# Patient Record
Sex: FEMALE | Race: WHITE | NOT HISPANIC OR LATINO | Employment: OTHER | URBAN - METROPOLITAN AREA
[De-identification: names, ages, dates, MRNs, and addresses within clinical notes are randomized per-mention and may not be internally consistent; named-entity substitution may affect disease eponyms.]

---

## 2017-01-05 ENCOUNTER — TELEPHONE (OUTPATIENT)
Dept: INTERNAL MEDICINE | Facility: CLINIC | Age: 77
End: 2017-01-05

## 2017-01-05 NOTE — TELEPHONE ENCOUNTER
----- Message from Christelle Saha sent at 1/5/2017  3:16 PM EST -----  Contact: Clara with [facility]  MAYA Billy pt -    Being discharged from [facility] tomorrow 1/6/2017.  Dr Bryan is following pts PT/INR   [facility] calling for continued nursing care.  They would like order for skilled nursing care    Please return verbal order to Clara  # [phone]

## 2017-01-16 RX ORDER — WARFARIN SODIUM 5 MG/1
5 TABLET ORAL
Qty: 180 TABLET | Refills: 0 | Status: SHIPPED | OUTPATIENT
Start: 2017-01-16 | End: 2017-01-24

## 2017-01-18 ENCOUNTER — OFFICE VISIT (OUTPATIENT)
Dept: CARDIOLOGY | Facility: CLINIC | Age: 77
End: 2017-01-18

## 2017-01-18 ENCOUNTER — HOSPITAL ENCOUNTER (OUTPATIENT)
Dept: CARDIOLOGY | Facility: HOSPITAL | Age: 77
Discharge: HOME | End: 2017-01-18
Attending: INTERNAL MEDICINE | Admitting: INTERNAL MEDICINE

## 2017-01-18 VITALS
SYSTOLIC BLOOD PRESSURE: 120 MMHG | HEIGHT: 62 IN | DIASTOLIC BLOOD PRESSURE: 60 MMHG | BODY MASS INDEX: 25.76 KG/M2 | WEIGHT: 140 LBS | HEART RATE: 52 BPM

## 2017-01-18 DIAGNOSIS — I35.0 NONRHEUMATIC AORTIC VALVE STENOSIS: Primary | ICD-10-CM

## 2017-01-18 DIAGNOSIS — I35.0 NONRHEUMATIC AORTIC VALVE STENOSIS: ICD-10-CM

## 2017-01-18 DIAGNOSIS — R94.31 ABNORMAL ELECTROCARDIOGRAM: ICD-10-CM

## 2017-01-18 DIAGNOSIS — Z95.2 S/P AVR: ICD-10-CM

## 2017-01-18 LAB
BH CV STRESS BP STAGE 1: NORMAL
BH CV STRESS DURATION MIN STAGE 1: 3
BH CV STRESS DURATION MIN STAGE 2: 1
BH CV STRESS DURATION SEC STAGE 1: 0
BH CV STRESS DURATION SEC STAGE 2: 0
BH CV STRESS GRADE STAGE 1: 0
BH CV STRESS GRADE STAGE 2: 5
BH CV STRESS HR STAGE 1: 58
BH CV STRESS HR STAGE 2: 60
BH CV STRESS METS STAGE 1: 2.3
BH CV STRESS METS STAGE 2: 3.5
BH CV STRESS PROTOCOL 1: NORMAL
BH CV STRESS RECOVERY BP: NORMAL MMHG
BH CV STRESS RECOVERY HR: 52 BPM
BH CV STRESS SPEED STAGE 1: 1.7
BH CV STRESS SPEED STAGE 2: 1.7
BH CV STRESS STAGE 1: 1
BH CV STRESS STAGE 2: 2
MAXIMAL PREDICTED HEART RATE: 144 BPM
PERCENT MAX PREDICTED HR: 41.67 %
STRESS BASELINE BP: NORMAL MMHG
STRESS BASELINE HR: 52 BPM
STRESS PERCENT HR: 49 %
STRESS POST EXERCISE DUR MIN: 4 MIN
STRESS POST PEAK BP: NORMAL MMHG
STRESS POST PEAK HR: 60 BPM
STRESS TARGET HR: 122 BPM

## 2017-01-18 PROCEDURE — 99214 OFFICE O/P EST MOD 30 MIN: CPT | Performed by: INTERNAL MEDICINE

## 2017-01-18 PROCEDURE — 93017 CV STRESS TEST TRACING ONLY: CPT

## 2017-01-18 PROCEDURE — 93018 CV STRESS TEST I&R ONLY: CPT | Performed by: INTERNAL MEDICINE

## 2017-01-18 PROCEDURE — 93016 CV STRESS TEST SUPVJ ONLY: CPT | Performed by: INTERNAL MEDICINE

## 2017-01-18 PROCEDURE — 93000 ELECTROCARDIOGRAM COMPLETE: CPT | Performed by: INTERNAL MEDICINE

## 2017-01-18 RX ORDER — FUROSEMIDE 40 MG/1
20 TABLET ORAL DAILY
Qty: 30 TABLET | Refills: 1
Start: 2017-01-18 | End: 2017-03-01

## 2017-01-18 RX ORDER — AMIODARONE HYDROCHLORIDE 200 MG/1
200 TABLET ORAL DAILY
Start: 2017-01-18 | End: 2017-03-01

## 2017-01-18 NOTE — PROGRESS NOTES
Date of Office Visit: 2017  Encounter Provider: Zaria Bryan MD  Place of Service: [facility] CARDIOLOGY  Patient Name: Dory Hampton  :1940      Patient ID:  Dory Hampton is a 76 y.o. female is here for  followup for AS, s/p AVR.         History of Present Illness    She had an echocardiogram which was performed on 2016 for a murmur. Her ejection fraction was 68% with moderate concentric left ventricular hypertrophy, normal segmental wall motion, grade II diastolic dysfunction, moderate aortic insufficiency and severe aortic stenosis. Her mean gradient was 64 mmHg with a maximum pressure gradient of 102. Her aortic valve area was calculated at 0.51. Her maximum velocity was 4 meters per second.      She has had anemia. In 2015 she presented to the emergency department at [facility] with a syncopal episode and was found to have a hemoglobin of 5. She had an extensive gastrointestinal evaluation including upper and lower endoscopy, and she did the capsule camera, and no gastrointestinal bleeding site was found. She then was transfused at that point. Then in 2016 she required a transfusion again. She was referred to Dr. Natalia Fu from [facility] hematology/oncology. Dr. Fu saw the patient and performed another transfusion on her in 2016 for a hemoglobin on 2016 that was 8.4. She said that when she was transfused in 2015 she felt good and her breathing improved, and she was not as fatigued. When she received her transfusion in 2016 she noticed no improvement in her breathing and she was still very fatigued.     She is treated for hypertension, hyperlipidemia and hypothyroidism and these have been well controlled. She quit smoking in . Her maternal grandmother had heart failure and myocardial infarctions.     The patient is  and lives with her  who has post herpetic neuralgia but otherwise is healthy. She has  three healthy children.       I evaluated her in the office and I felt she was suffering from severe symptomatic aortic stenosis, and I recommended she be worked up for this.  She underwent a cardiac catheterization on 08/25/2016 and this revealed 10% mid left main stenosis, normal left circumflex, 50% mid left anterior descending artery stenosis, 30% mid RCA stenosis and severe aortic valvular stenosis.  At that time she was given a referral to cardiothoracic surgery.  They ordered bilateral carotid duplex studies which were obtained on 12/15/2016 and revealed mild bilateral internal carotid artery stenosis.  On 12/16/2016 she underwent surgery and received a mini-sternotomy and had a 21 mm Magna pericardial prosthetic aortic valve replacement.  She was in the hospital from 12/15/2016 to 12/23/2016.  During that time she did have postoperative atrial fibrillation with rapid ventricular response and bilateral pleural effusions.  She also did have some anemia requiring a transfusion prior to her surgery.  At one point, there was talk about doing a right thoracentesis but then her pleural effusion resolved with diuretic therapy.  Coumadin was administered because of atrial fibrillation postoperativel y.       She had laboratory values done on 12/22/2016.  Her hemoglobin was 10.1 with a hematocrit of 30.9.  She has a basic metabolic panel performed on 12/23/2016 which was essentially normal.       At this time she is doing well.  She has no tachycardia but she does have some mild dizziness, especially when she stands up.  She feels lightheaded at times.  She has had no exertional chest pain but she does have some tightness when she is just sitting there, which she thinks is just her chest healing.  She has no difficulty breathing.  She is tolerating her medications well.  She has had a slight nosebleed but no black stools and no bloody stools.  She remains on warfarin at this time because of atrial fibrillation.   Overall, she feels like she is doing better.          Past Medical History   Diagnosis Date   • Anemia    • Aortic stenosis    • Asthma      HISTORY   • Bradycardia    • Chest pain    • Coronary artery disease      heart murmer   • Environmental allergies    • Fatigue    • Female stress incontinence    • History of diverticulosis    • History of transfusion    • Hyperlipidemia    • Hypertension    • Hypokalemia    • Hypothyroid    • Iron deficiency anemia      hemolytic anemia   • PAF (paroxysmal atrial fibrillation)      with RVR   • Pleural effusion      bilateral, post op   • Psoriasis    • Tendinitis    • Thoracic aorta atherosclerosis          Past Surgical History   Procedure Laterality Date   • Dental procedure  2011     Implants   • Cataract extraction Bilateral      Left 12/10/13, Right 12/17/2013   • Colonoscopy  06/22/2015     Dr. Mohamud   • Endoscopy  06/22/2015     Dr. Mohamud   • Capsule endoscopy  2015   • Tonsillectomy     • Tubal abdominal ligation     • Appendectomy  06/1972   • Cardiac catheterization N/A 8/25/2016     Procedure: Coronary angiography;  Surgeon: Lulú Hooper MD;  Location: [facility] CATH INVASIVE LOCATION;  Service:    • Cardiac catheterization N/A 8/25/2016     Procedure: Right Heart Cath;  Surgeon: Lulú Hooper MD;  Location: [facility] CATH INVASIVE LOCATION;  Service:    • Cataract extraction     • Pelvic laparoscopy     • Small bowel enteroscopy N/A 11/21/2016     Procedure: ENTEROSCOPY SMALL BOWEL WITH APC CAUTERY;  Surgeon: Tj Torres MD;  Location: [facility] ENDOSCOPY;  Service:    • Aortic valve repair/replacement N/A 12/16/2016     Procedure: BHARGAVI MINI STERNOTOMY AORTIC VALVE REPLACEMENT;  Surgeon: Robin Jones MD;  Location: [facility] OR;  Service:    • Sternotomy       mini       Current Outpatient Prescriptions on File Prior to Visit   Medication Sig Dispense Refill   • acetaminophen (TYLENOL) 325 MG tablet Take 2 tablets by mouth Every 4 (Four) Hours As  Needed for mild pain (1-3).  0   • amiodarone (PACERONE) 200 MG tablet Take 1 tablet by mouth Every 8 (Eight) Hours.     • aspirin 81 MG EC tablet Take 1 tablet by mouth Daily.     • betamethasone valerate (VALISONE) 0.1 % cream Apply 1 application topically Daily As Needed.     • calcipotriene-betamethasone (TACLONEX) 0.005-0.064 % ointment Apply 1 application topically daily.     • clarithromycin (BIAXIN) 500 MG tablet Take 500 mg by mouth as needed. Prior to dental procedures     • Cyanocobalamin (VITAMIN B-12 PO) Take 1,000 mcg by mouth daily.     • ferrous sulfate 325 (65 FE) MG tablet Take 325 mg by mouth Daily With Breakfast. PT HOLDING FOR SURGERY     • furosemide (LASIX) 40 MG tablet Take 1 tablet by mouth Daily. 30 tablet 1   • GuaiFENesin (MUCINEX PO) Take 600 mg by mouth as needed.     • levothyroxine (SYNTHROID, LEVOTHROID) 88 MCG tablet Take 88 mcg by mouth daily.     • metoprolol tartrate (LOPRESSOR) 25 MG tablet Take 1 tablet by mouth 2 (Two) Times a Day. 60 tablet 1   • Multiple Vitamins-Minerals (CENTRUM SILVER PO) Take 1 tablet by mouth Daily. PT HOLDING FOR SURGERY     • potassium chloride (MICRO-K) 10 MEQ CR capsule Take 2 capsules by mouth Daily. 30 capsule 1   • Pyridoxine HCl (VITAMIN B6 PO) Take 100 mg by mouth daily.     • Red Yeast Rice 600 MG tablet Take 600 mg by mouth Daily. PT HOLDING FOR SURGERY     • warfarin (COUMADIN) 2 MG tablet Take 1 tablet by mouth Daily. Or as instructed 30 tablet    • warfarin (COUMADIN) 5 MG tablet Take 1 tablet by mouth Daily. 180 tablet 0     No current facility-administered medications on file prior to visit.        Social History     Social History   • Marital status:      Spouse name: Kody   • Number of children: N/A   • Years of education: College     Occupational History   • Teacher, retired      [employer]     Social History Main Topics   • Smoking status: Former Smoker     Packs/day: 1.00     Years: 20.00     Types: Cigarettes   • Smokeless  "tobacco: Never Used      Comment: QUIT 2005   • Alcohol use Yes      Comment: 1 DRINK A WEEK   ///   caffeine use   • Drug use: No   • Sexual activity: Defer     Other Topics Concern   • Not on file     Social History Narrative           Review of Systems   Constitution: Negative.   HENT: Negative for congestion and headaches.    Eyes: Negative for vision loss in left eye and vision loss in right eye.   Respiratory: Negative.  Negative for cough, hemoptysis, shortness of breath, sleep disturbances due to breathing, snoring, sputum production and wheezing.    Endocrine: Negative.    Hematologic/Lymphatic: Negative.    Skin: Negative for poor wound healing and rash.   Musculoskeletal: Negative for falls, gout, muscle cramps and myalgias.   Gastrointestinal: Negative for abdominal pain, diarrhea, dysphagia, hematemesis, melena, nausea and vomiting.   Neurological: Negative for excessive daytime sleepiness, dizziness, light-headedness, loss of balance, seizures and vertigo.   Psychiatric/Behavioral: Negative for depression and substance abuse. The patient is not nervous/anxious.        Procedures    ECG 12 Lead  Date/Time: 1/18/2017 3:22 PM  Performed by: DYANA FIGUEROA  Authorized by: DYANA FIGUEROA   Comparison: compared with previous ECG   Similar to previous ECG  Rhythm: sinus rhythm  Conduction: 1st degree  T flattening: I and aVL  Clinical impression: non-specific ECG               Objective:      Vitals:    01/18/17 1509   BP: 120/60   BP Location: Right arm   Patient Position: Sitting   Pulse: 52   Weight: 140 lb (63.5 kg)   Height: 62\" (157.5 cm)     Body mass index is 25.61 kg/(m^2).    Physical Exam   Constitutional: She is oriented to person, place, and time. She appears well-developed and well-nourished. No distress.   HENT:   Head: Normocephalic and atraumatic.   Eyes: Conjunctivae are normal. No scleral icterus.   Neck: Neck supple. No JVD present. Carotid bruit is not present. No thyromegaly " present.   Cardiovascular: Normal rate, regular rhythm, S1 normal, S2 normal and intact distal pulses.   No extrasystoles are present. PMI is not displaced.  Exam reveals no gallop.    Murmur heard.   Medium-pitched harsh midsystolic murmur is present with a grade of 2/6   Pulses:       Carotid pulses are 2+ on the right side, and 2+ on the left side.       Radial pulses are 2+ on the right side, and 2+ on the left side.        Dorsalis pedis pulses are 2+ on the right side, and 2+ on the left side.        Posterior tibial pulses are 2+ on the right side, and 2+ on the left side.   Pulmonary/Chest: Effort normal and breath sounds normal. No respiratory distress. She has no wheezes. She has no rhonchi. She has no rales. She exhibits no tenderness.   Abdominal: Soft. Bowel sounds are normal. She exhibits no distension, no abdominal bruit and no mass. There is no tenderness.   Musculoskeletal: She exhibits no edema or deformity.   Lymphadenopathy:     She has no cervical adenopathy.   Neurological: She is alert and oriented to person, place, and time. No cranial nerve deficit.   Skin: Skin is warm and dry. No rash noted. She is not diaphoretic. No cyanosis. No pallor. Nails show no clubbing.   Psychiatric: She has a normal mood and affect. Judgment normal.   Vitals reviewed.      Lab Review:       Assessment:      Diagnosis Plan   1. Nonrheumatic aortic valve stenosis     2. S/P AVR        1. Severe aortic stenosis status post tissue-type aortic valve replacement.  At this time she is doing well.    2. Atrial fibrillation after her aortic valve replacement.  She is still on warfarin and amiodarone at this time but we will decrease the amiodarone to 200 mg daily with the idea of discontinuing after her next visit.  After that, we will probably place a Holter monitor to make sure she is not having any more atrial fibrillation, and then consider stopping warfarin.    3. Pleural effusions which have resolved after surgery.   We will decrease her Lasix to 20 mg daily with the idea of discontinuing it in two weeks and stop her potassium at that time.    4. Mild coronary artery disease.   5. History of anemia.  She follows with Dr. Fu but she has not seen her in some time, so she may need a followup with her.  Her last hemoglobin was about 10.  I have asked her to get a BMP and CBC with Dr. Billy.           Plan:       Overall doing well, rehab treadmill today and start rehab.  She may drive.

## 2017-01-18 NOTE — MR AVS SNAPSHOT
Dory Lupin   1/18/2017 2:40 PM   Office Visit    Dept Phone:  [phone]   Encounter #:  [id number]    Provider:  Zaria Bryan MD   Department:  [facility] CARDIOLOGY                Your Full Care Plan              Today's Medication Changes          These changes are accurate as of: 1/18/17  4:22 PM.  If you have any questions, ask your nurse or doctor.               Medication(s)that have changed:     amiodarone 200 MG tablet   Commonly known as:  PACERONE   Take 1 tablet by mouth Daily.   What changed:  when to take this   Changed by:  Zaria Bryan MD       furosemide 40 MG tablet   Commonly known as:  LASIX   Take 0.5 tablets by mouth Daily.   What changed:  how much to take   Changed by:  Zaria Bryan MD         Stop taking medication(s)listed here:     potassium chloride 10 MEQ CR capsule   Commonly known as:  MICRO-K   Stopped by:  Zaria Bryan MD           Red Yeast Rice 600 MG tablet   Stopped by:  Zaria Bryan MD                Where to Get Your Medications      Information about where to get these medications is not yet available     ! Ask your nurse or doctor about these medications     amiodarone 200 MG tablet    furosemide 40 MG tablet                  Your Updated Medication List          This list is accurate as of: 1/18/17  4:22 PM.  Always use your most recent med list.                acetaminophen 325 MG tablet   Commonly known as:  TYLENOL   Take 2 tablets by mouth Every 4 (Four) Hours As Needed for mild pain (1-3).       amiodarone 200 MG tablet   Commonly known as:  PACERONE   Take 1 tablet by mouth Daily.       aspirin 81 MG EC tablet   Take 1 tablet by mouth Daily.       betamethasone valerate 0.1 % cream   Commonly known as:  VALISONE       CENTRUM SILVER PO       clarithromycin 500 MG tablet   Commonly known as:  BIAXIN       ferrous sulfate 325 (65 FE) MG tablet       furosemide 40 MG tablet    Commonly known as:  LASIX   Take 0.5 tablets by mouth Daily.       levothyroxine 88 MCG tablet   Commonly known as:  SYNTHROID, LEVOTHROID       metoprolol tartrate 25 MG tablet   Commonly known as:  LOPRESSOR   Take 1 tablet by mouth 2 (Two) Times a Day.       MUCINEX PO       TACLONEX 0.005-0.064 % ointment   Generic drug:  calcipotriene-betamethasone       VITAMIN B-12 PO       VITAMIN B6 PO       * warfarin 2 MG tablet   Commonly known as:  COUMADIN   Take 1 tablet by mouth Daily. Or as instructed       * warfarin 5 MG tablet   Commonly known as:  COUMADIN   Take 1 tablet by mouth Daily.       * Notice:  This list has 2 medication(s) that are the same as other medications prescribed for you. Read the directions carefully, and ask your doctor or other care provider to review them with you.            We Performed the Following     Ambulatory Referral to Cardiac Rehab     ECG 12 Lead       You Were Diagnosed With        Codes Comments    Nonrheumatic aortic valve stenosis    -  Primary ICD-10-CM: I35.0  ICD-9-CM: 424.1     S/P AVR     ICD-10-CM: Z95.2  ICD-9-CM: V43.3     Abnormal electrocardiogram     ICD-10-CM: R94.31  ICD-9-CM: 794.31       Instructions     None    Patient Instructions History      Upcoming Appointments     Visit Type Date Time Department    HOSPITAL FOLLOW UP 1/18/2017  2:40 PM [facility] STRESS TEST ONLY [place] 1/18/2017  3:00 PM [facility] CARD    FOLLOW UP 1/24/2017 10:00 AM [facility]    POST-OP 1/26/2017  1:30 PM [facility] CARDIAC SURG [facility]    FOLLOW UP 3/1/2017  2:20 PM [facility] [place]      [facility] Signup     [facility] Money Dashboard allows you to send messages to your doctor, view your test results, renew your prescriptions, schedule appointments, and more. To sign up, go to [employer] and click on the Sign Up Now link in the New User? box. Enter your Money Dashboard Activation Code exactly as it appears below along with the last four digits of your Social  "Security Number and your Date of Birth () to complete the sign-up process. If you do not sign up before the expiration date, you must request a new code.    [employer] Activation Code: [email or web address]  Expires: 2017  4:22 PM    If you have questions, you can email [email or web address] or call [phone] to talk to our [employer] staff. Remember, [employer] is NOT to be used for urgent needs. For medical emergencies, dial 911.               Other Info from Your Visit           Your Appointments     2017 10:00 AM EST   Follow Up with Natasha Billy MD   [facility] INTERNAL MEDICINE (--)    [address]  [facility] [address] [phone]           Arrive 15 minutes prior to appointment.            2017  1:30 PM EST   Post-Op with KEVIN Liao   [facility] CARDIAC SURGERY (--)    [address]  [facility] [address]   [phone]            Mar 01, 2017  2:20 PM EST   Follow Up with Zaria Bryan MD   [facility] CARDIOLOGY (--)    [address]  [facility] [address]   [phone]           Arrive 15 minutes prior to appointment.              Allergies     Eggs Or Egg-derived Products  Swelling    Formaldehyde Allergy Other (See Comments)    Positive allergy testing    Other Allergy Swelling    Tree nuts, pecans, walnuts, eggs        Mouth swells    Penicillins Allergy Rash    Sulfa Antibiotics Allergy Rash      Reason for Visit     Hypertension           Vital Signs     Blood Pressure Pulse Height Weight Body Mass Index Smoking Status    120/60 (BP Location: Right arm, Patient Position: Sitting) 52 62\" (157.5 cm) 140 lb (63.5 kg) 25.61 kg/m2 Former Smoker      Problems and Diagnoses Noted     Aortic heart valve narrowing    Status post aortic valve replacement    Abnormal EKG          Results         "

## 2017-01-20 ENCOUNTER — TELEPHONE (OUTPATIENT)
Dept: CARDIAC SURGERY | Facility: CLINIC | Age: 77
End: 2017-01-20

## 2017-01-20 NOTE — TELEPHONE ENCOUNTER
PT HAS POST OP APPT.  ON 1/24 HOWEVER SHE CANNOT DRIVE AND HER DAUGHTER TEACHES AND CAN ONLY BRING HER AFTER 3 PM. SHE TOOK OFF FROM WORK FOR HER SURGERY AND CANNOT TAKE OFF ANYMORE WITHOUT RISKING LOSING HER JOB. I SPOKE WITH DAUGHTER AND SHE IS WILLING TO BRING HER AFTER WORK ANY DAY OF THE WEEK.

## 2017-01-23 RX ORDER — WARFARIN SODIUM 2 MG/1
TABLET ORAL
Qty: 30 TABLET | Refills: 1 | Status: SHIPPED | OUTPATIENT
Start: 2017-01-23 | End: 2017-02-03 | Stop reason: SDUPTHER

## 2017-01-24 ENCOUNTER — OFFICE VISIT (OUTPATIENT)
Dept: CARDIAC SURGERY | Facility: CLINIC | Age: 77
End: 2017-01-24

## 2017-01-24 ENCOUNTER — OFFICE VISIT (OUTPATIENT)
Dept: INTERNAL MEDICINE | Facility: CLINIC | Age: 77
End: 2017-01-24

## 2017-01-24 VITALS
TEMPERATURE: 97.4 F | WEIGHT: 138 LBS | RESPIRATION RATE: 20 BRPM | HEART RATE: 69 BPM | BODY MASS INDEX: 25.4 KG/M2 | OXYGEN SATURATION: 99 % | DIASTOLIC BLOOD PRESSURE: 78 MMHG | SYSTOLIC BLOOD PRESSURE: 163 MMHG | HEIGHT: 62 IN

## 2017-01-24 VITALS
SYSTOLIC BLOOD PRESSURE: 152 MMHG | HEART RATE: 57 BPM | HEIGHT: 62 IN | OXYGEN SATURATION: 97 % | WEIGHT: 143 LBS | DIASTOLIC BLOOD PRESSURE: 60 MMHG | BODY MASS INDEX: 26.31 KG/M2

## 2017-01-24 DIAGNOSIS — I10 ESSENTIAL HYPERTENSION: ICD-10-CM

## 2017-01-24 DIAGNOSIS — E78.5 HYPERLIPIDEMIA, UNSPECIFIED HYPERLIPIDEMIA TYPE: Primary | ICD-10-CM

## 2017-01-24 DIAGNOSIS — R73.01 ELEVATED FASTING GLUCOSE: ICD-10-CM

## 2017-01-24 DIAGNOSIS — Z95.2 S/P AVR: Primary | ICD-10-CM

## 2017-01-24 DIAGNOSIS — E03.9 HYPOTHYROIDISM, ADULT: ICD-10-CM

## 2017-01-24 LAB
ALBUMIN SERPL-MCNC: 3.47 G/DL (ref 3.4–4.6)
ALBUMIN/GLOB SERPL: 1.3 G/DL
ALP SERPL-CCNC: 80 U/L (ref 46–116)
ALT SERPL W P-5'-P-CCNC: 14 U/L (ref 14–59)
ANION GAP SERPL CALCULATED.3IONS-SCNC: 8 MMOL/L
AST SERPL-CCNC: 24 U/L (ref 7–37)
BILIRUB SERPL-MCNC: 0.5 MG/DL (ref 0.2–1)
BUN BLD-MCNC: 35 MG/DL (ref 6–22)
BUN/CREAT SERPL: 25.4 (ref 7–25)
CALCIUM SPEC-SCNC: 8.5 MG/DL (ref 8.6–10.5)
CHLORIDE SERPL-SCNC: 100 MMOL/L (ref 95–107)
CHOLEST SERPL-MCNC: 184 MG/DL (ref 0–200)
CO2 SERPL-SCNC: 31 MMOL/L (ref 23–32)
CREAT BLD-MCNC: 1.38 MG/DL (ref 0.55–1.02)
DEPRECATED RDW RBC AUTO: 57.4 FL (ref 37–54)
ERYTHROCYTE [DISTWIDTH] IN BLOOD BY AUTOMATED COUNT: 16.6 % (ref 11.5–15)
GFR SERPL CREATININE-BSD FRML MDRD: 37 ML/MIN/1.73
GLOBULIN UR ELPH-MCNC: 2.7 GM/DL
GLUCOSE BLD-MCNC: 103 MG/DL (ref 70–100)
HCT VFR BLD AUTO: 26.6 % (ref 34.1–44.9)
HDLC SERPL-MCNC: 58 MG/DL (ref 40–81)
HGB BLD-MCNC: 8.3 G/DL (ref 11.2–15.7)
LDLC SERPL CALC-MCNC: 106 MG/DL (ref 0–100)
LDLC/HDLC SERPL: 1.83 {RATIO}
MCH RBC QN AUTO: 31.1 PG (ref 26–34)
MCHC RBC AUTO-ENTMCNC: 31.2 G/DL (ref 31–37)
MCV RBC AUTO: 99.6 FL (ref 80–100)
PLATELET # BLD AUTO: 270 10*3/MM3 (ref 150–450)
PMV BLD AUTO: 10.6 FL (ref 6–12)
POTASSIUM BLD-SCNC: 3.3 MMOL/L (ref 3.3–5.3)
PROT SERPL-MCNC: 6.2 G/DL (ref 6.3–8.4)
RBC # BLD AUTO: 2.67 10*6/MM3 (ref 3.93–5.22)
SODIUM BLD-SCNC: 139 MMOL/L (ref 136–145)
TRIGL SERPL-MCNC: 99 MG/DL (ref 0–150)
TSH SERPL DL<=0.05 MIU/L-ACNC: 2.74 MIU/ML (ref 0.4–4.2)
VLDLC SERPL-MCNC: 19.8 MG/DL
WBC NRBC COR # BLD: 8.06 10*3/MM3 (ref 5–10)

## 2017-01-24 PROCEDURE — 99024 POSTOP FOLLOW-UP VISIT: CPT | Performed by: NURSE PRACTITIONER

## 2017-01-24 PROCEDURE — 36415 COLL VENOUS BLD VENIPUNCTURE: CPT | Performed by: INTERNAL MEDICINE

## 2017-01-24 PROCEDURE — 84443 ASSAY THYROID STIM HORMONE: CPT | Performed by: INTERNAL MEDICINE

## 2017-01-24 PROCEDURE — 80061 LIPID PANEL: CPT | Performed by: INTERNAL MEDICINE

## 2017-01-24 PROCEDURE — 85027 COMPLETE CBC AUTOMATED: CPT | Performed by: INTERNAL MEDICINE

## 2017-01-24 PROCEDURE — 99214 OFFICE O/P EST MOD 30 MIN: CPT | Performed by: INTERNAL MEDICINE

## 2017-01-24 PROCEDURE — 80053 COMPREHEN METABOLIC PANEL: CPT | Performed by: INTERNAL MEDICINE

## 2017-01-24 NOTE — PROGRESS NOTES
Chief Complaint   Patient presents with   • Hypertension   • Hypothyroidism        Subjective   Dory Hampton is a 76 y.o. female she comes in for follow-up of hypertension and hypothyroidism.  These are chronic conditions.  Since her last appointment here, she has had hematology evaluation for anemia, GI evaluation for gastrointestinal bleeding, has had AVMs cauterized, has had aortic valve replacement for severe aortic stenosis.  She has had blood transfusions, takes a B12 supplement and iron supplement.    Hypertension   This is a chronic problem. The problem is controlled. Pertinent negatives include no chest pain, headaches, orthopnea, palpitations, peripheral edema or PND. Shortness of breath: she had some shortness of breath with exertion, this has improved with treatment of aortic stenosis and anemia. There are no associated agents to hypertension. Treatments tried: Current treatment metoprolol. The current treatment provides moderate improvement. There are no compliance problems.    Hyperlipidemia   This is a chronic problem. Recent lipid tests were reviewed and are variable (LDL marginally high.). Exacerbating diseases include hypothyroidism. She has no history of diabetes. Pertinent negatives include no chest pain. Shortness of breath: she had some shortness of breath with exertion, this has improved with treatment of aortic stenosis and anemia. Current antihyperlipidemic treatment includes diet change. The current treatment provides moderate improvement of lipids. There are no compliance problems.    :   Elevated fasting glucose:  She does not have history of diabetes.  However, she had elevated fasting glucose during her hospitalization.  She denies polyuria, polydipsia, vision change appetite change, unexplained weight loss.   Lab Results   Component Value Date    HGBA1C 4.30 (L) 12/15/2016         Hypothyroidism: Current treatment levothyroxine. She is compliant with the medication, tolerates it well  "and reports good control of symptoms of hypothyroidism.  She has not noted any change in tolerance of heat or cold.  No change in hair or skin.  No constipation.  No symptoms of hyperthyroidism.  She generally feels cold.  However she does not think that this is a change.    She had atrial fibrillation after aortic valve replacement.  She has recently seen her cardiologist for this.    The following portions of the patient's history were reviewed and updated as appropriate: allergies, current medications, past social history, problem list, past surgical history    Review of Systems   Constitutional: Negative for activity change and appetite change.   HENT: Negative for nosebleeds.    Eyes: Negative for visual disturbance.   Respiratory: Negative for cough. Shortness of breath: she had some shortness of breath with exertion, this has improved with treatment of aortic stenosis and anemia.    Cardiovascular: Negative for chest pain, palpitations, orthopnea, leg swelling and PND.   Neurological: Negative for headaches.   Psychiatric/Behavioral: Negative for sleep disturbance.       Visit Vitals   • /60 (BP Location: Right arm)   • Pulse 57   • Ht 62\" (157.5 cm)   • Wt 143 lb (64.9 kg)   • SpO2 97%   • BMI 26.16 kg/m2        Objective   Physical Exam   Constitutional: She is oriented to person, place, and time. She appears well-developed and well-nourished. No distress.   Neck: Normal carotid pulses present. Carotid bruit is not present.   Cardiovascular: Normal rate, regular rhythm, S1 normal, S2 normal and intact distal pulses.  Exam reveals no gallop and no friction rub.    No murmur heard.  Pulses:       Carotid pulses are 2+ on the right side, and 2+ on the left side.  Pulmonary/Chest: Effort normal and breath sounds normal. No respiratory distress. She has no wheezes. She has no rales. Chest wall is not dull to percussion.   Musculoskeletal: She exhibits no edema.   Neurological: She is alert and oriented to " person, place, and time.   Skin: Skin is warm and dry.   Nursing note and vitals reviewed.      Assessment/Plan   Problem List Items Addressed This Visit        Cardiovascular and Mediastinum    Essential hypertension    Relevant Orders    Comprehensive Metabolic Panel    Lipid Panel    CBC (No Diff)    Hyperlipidemia - Primary       Endocrine    Hypothyroidism, adult    Relevant Orders    TSH      Other Visit Diagnoses     Elevated fasting glucose        Relevant Orders    Hemoglobin A1c

## 2017-01-24 NOTE — MR AVS SNAPSHOT
Dory Memo   1/24/2017 10:00 AM   Office Visit    Dept Phone:  [phone]   Encounter #:  [id number]    Provider:  Natasha Billy MD   Department:  [facility] INTERNAL MEDICINE                Your Full Care Plan              Today's Medication Changes          These changes are accurate as of: 1/24/17 10:33 AM.  If you have any questions, ask your nurse or doctor.               Medication(s)that have changed:     warfarin 2 MG tablet   Commonly known as:  COUMADIN   Take one tablet daily or as directed   What changed:  Another medication with the same name was removed. Continue taking this medication, and follow the directions you see here.   Changed by:  Karol Lujan MA         Stop taking medication(s)listed here:     MUCINEX PO   Stopped by:  Natasha Billy MD                      Your Updated Medication List          This list is accurate as of: 1/24/17 10:33 AM.  Always use your most recent med list.                acetaminophen 325 MG tablet   Commonly known as:  TYLENOL   Take 2 tablets by mouth Every 4 (Four) Hours As Needed for mild pain (1-3).       amiodarone 200 MG tablet   Commonly known as:  PACERONE   Take 1 tablet by mouth Daily.       aspirin 81 MG EC tablet   Take 1 tablet by mouth Daily.       betamethasone valerate 0.1 % cream   Commonly known as:  VALISONE       CENTRUM SILVER PO       clarithromycin 500 MG tablet   Commonly known as:  BIAXIN       ferrous sulfate 325 (65 FE) MG tablet       furosemide 40 MG tablet   Commonly known as:  LASIX   Take 0.5 tablets by mouth Daily.       levothyroxine 88 MCG tablet   Commonly known as:  SYNTHROID, LEVOTHROID       metoprolol tartrate 25 MG tablet   Commonly known as:  LOPRESSOR   Take 1 tablet by mouth 2 (Two) Times a Day.       TACLONEX 0.005-0.064 % ointment   Generic drug:  calcipotriene-betamethasone       VITAMIN B-12 PO       VITAMIN B6 PO       warfarin 2 MG tablet   Commonly known as:  COUMADIN    Take one tablet daily or as directed               You Were Diagnosed With        Codes Comments    Hyperlipidemia, unspecified hyperlipidemia type    -  Primary ICD-10-CM: E78.5  ICD-9-CM: 272.4     Essential hypertension     ICD-10-CM: I10  ICD-9-CM: 401.9     Hypothyroidism, adult     ICD-10-CM: E03.9  ICD-9-CM: 244.9     Elevated fasting glucose     ICD-10-CM: R73.01  ICD-9-CM: 790.21       Instructions     None    Patient Instructions History      Upcoming Appointments     Visit Type Date Time Department    FOLLOW UP 2017 10:00 AM [facility]    POST-OP 2017  3:00 PM [facility]    FOLLOW UP 3/1/2017  2:20 PM [facility] [place]    FOLLOW UP 2017 10:00 AM [facility]      [employer] Signup     [employer] allows you to send messages to your doctor, view your test results, renew your prescriptions, schedule appointments, and more. To sign up, go to [employer] and click on the Sign Up Now link in the New User? box. Enter your [employer] Activation Code exactly as it appears below along with the last four digits of your Social Security Number and your Date of Birth () to complete the sign-up process. If you do not sign up before the expiration date, you must request a new code.    [employer] Activation Code: [email or web address]  Expires: 2017  4:22 PM    If you have questions, you can email [email or web address] or call [phone] to talk to our [employer] staff. Remember, [employer] is NOT to be used for urgent needs. For medical emergencies, dial 911.               Other Info from Your Visit           Your Appointments     2017  3:00 PM EST   Post-Op with KEVIN Gonzales   [facility] CARDIAC SURGERY (--)    [address]  [facility] [address]   [phone]            Mar 01, 2017  2:20 PM EST   Follow Up with Zaria Bryan MD   [facility] CARDIOLOGY (--)    [address]  [address]  [facility] [address] [phone]           Arrive 15 minutes prior to appointment.            May 31, 2017 10:00 AM EDT   Follow Up with Natasha Billy MD   [facility] INTERNAL MEDICINE (--)    [address]  [facility] [address] [phone]           Arrive 15 minutes prior to appointment.              Allergies     Eggs Or Egg-derived Products  Swelling    Formaldehyde Allergy Other (See Comments)    Positive allergy testing    Other Allergy Swelling    Tree nuts, pecans, walnuts, eggs        Mouth swells    Penicillins Allergy Rash    Sulfa Antibiotics Allergy Rash      Reason for Visit     Hypertension     Hypothyroidism           Vital Signs     Blood Pressure Pulse Height Weight Oxygen Saturation Body Mass Index    152/60 (BP Location: Right arm) 57 62\" (157.5 cm) 143 lb (64.9 kg) 97% 26.16 kg/m2    Smoking Status                   Former Smoker           Problems and Diagnoses Noted     High blood pressure    High cholesterol or triglycerides    Hypothyroidism, adult    Elevated fasting glucose            "

## 2017-01-27 ENCOUNTER — TELEPHONE (OUTPATIENT)
Dept: INTERNAL MEDICINE | Facility: CLINIC | Age: 77
End: 2017-01-27

## 2017-02-03 RX ORDER — WARFARIN SODIUM 2 MG/1
TABLET ORAL
Qty: 30 TABLET | Refills: 3 | Status: SHIPPED | OUTPATIENT
Start: 2017-02-03 | End: 2017-02-08 | Stop reason: SDUPTHER

## 2017-02-05 NOTE — PROGRESS NOTES
2/5/2017        Subjective:      Natasha Billy MD    Chief Complaint of No chief complaint on file.           Dear Dr. Natasha Billy MD and Colleagues,    It was nice to see Dory Hampton in follow up today. She is status post mini sternotomy AVR on 12/16/16 per Dr. Jones. She reports feeling very well and denies c/o's. She has seen Dr Bryan and Dr. Billy since d/c. She reports starting cardiac rehab.    Patient Active Problem List   Diagnosis   • Essential hypertension   • Hyperlipidemia   • Hypothyroidism, adult   • Aortic stenosis   • Thoracic aorta atherosclerosis   • Iron deficiency anemia due to chronic blood loss   • Macrocytic anemia   • Hemolytic anemia, mechanical   • Dyspnea on effort   • AVM (arteriovenous malformation) of small bowel, acquired with hemorrhage   • Aortic valve stenosis   • S/P AVR       Past Medical History   Diagnosis Date   • Anemia    • Aortic stenosis    • Asthma      HISTORY   • Bradycardia    • Chest pain    • Coronary artery disease      heart murmer   • Environmental allergies    • Fatigue    • Female stress incontinence    • History of diverticulosis    • History of transfusion    • Hyperlipidemia    • Hypertension    • Hypokalemia    • Hypothyroid    • Iron deficiency anemia      hemolytic anemia   • PAF (paroxysmal atrial fibrillation)      with RVR   • Pleural effusion      bilateral, post op   • Psoriasis    • Tendinitis    • Thoracic aorta atherosclerosis        Past Surgical History   Procedure Laterality Date   • Dental procedure  2011     Implants   • Cataract extraction Bilateral      Left 12/10/13, Right 12/17/2013   • Colonoscopy  06/22/2015     Dr. Mohamud   • Endoscopy  06/22/2015     Dr. Mohamud   • Capsule endoscopy  2015   • Tonsillectomy     • Tubal abdominal ligation     • Appendectomy  06/1972   • Cardiac catheterization N/A 8/25/2016     Procedure: Coronary angiography;  Surgeon: Lulú Hooper MD;  Location: [facility] INVASIVE LOCATION;  Service:    •  Cardiac catheterization N/A 8/25/2016     Procedure: Right Heart Cath;  Surgeon: Lulú Hooper MD;  Location: [facility] CATH INVASIVE LOCATION;  Service:    • Cataract extraction     • Pelvic laparoscopy     • Small bowel enteroscopy N/A 11/21/2016     Procedure: ENTEROSCOPY SMALL BOWEL WITH APC CAUTERY;  Surgeon: Tj Torres MD;  Location: [facility] ENDOSCOPY;  Service:    • Aortic valve repair/replacement N/A 12/16/2016     Procedure: BHARGAVI MINI STERNOTOMY AORTIC VALVE REPLACEMENT;  Surgeon: Robin Jones MD;  Location: [facility] MAIN OR;  Service:    • Sternotomy       mini       Allergies   Allergen Reactions   • Eggs Or Egg-Derived Products Swelling   • Formaldehyde Other (See Comments)     Positive allergy testing   • Other Swelling     Tree nuts, pecans, walnuts, eggs        Mouth swells   • Penicillins Rash   • Sulfa Antibiotics Rash       Review of Systems   Constitutional: Negative.    HENT: Negative.    Eyes: Negative.    Respiratory: Negative.    Cardiovascular: Negative.    Gastrointestinal: Negative.    Genitourinary: Negative.    Musculoskeletal: Negative.    Skin: Negative.    Allergic/Immunologic: Negative.    Neurological: Negative.    Psychiatric/Behavioral: Negative.          Current Outpatient Prescriptions:   •  acetaminophen (TYLENOL) 325 MG tablet, Take 2 tablets by mouth Every 4 (Four) Hours As Needed for mild pain (1-3)., Disp: , Rfl: 0  •  amiodarone (PACERONE) 200 MG tablet, Take 1 tablet by mouth Daily., Disp: , Rfl:   •  aspirin 81 MG EC tablet, Take 1 tablet by mouth Daily., Disp: , Rfl:   •  betamethasone valerate (VALISONE) 0.1 % cream, Apply 1 application topically Daily As Needed., Disp: , Rfl:   •  clarithromycin (BIAXIN) 500 MG tablet, Take 500 mg by mouth as needed. Prior to dental procedures, Disp: , Rfl:   •  Cyanocobalamin (VITAMIN B-12 PO), Take 1,000 mcg by mouth daily., Disp: , Rfl:   •  ferrous sulfate 325 (65 FE) MG tablet, Take 325 mg by mouth Daily With Breakfast. PT  HOLDING FOR SURGERY, Disp: , Rfl:   •  furosemide (LASIX) 40 MG tablet, Take 0.5 tablets by mouth Daily., Disp: 30 tablet, Rfl: 1  •  levothyroxine (SYNTHROID, LEVOTHROID) 88 MCG tablet, Take 88 mcg by mouth daily., Disp: , Rfl:   •  metoprolol tartrate (LOPRESSOR) 25 MG tablet, Take 1 tablet by mouth 2 (Two) Times a Day., Disp: 60 tablet, Rfl: 1  •  Multiple Vitamins-Minerals (CENTRUM SILVER PO), Take 1 tablet by mouth Daily. PT HOLDING FOR SURGERY, Disp: , Rfl:   •  Pyridoxine HCl (VITAMIN B6 PO), Take 100 mg by mouth daily., Disp: , Rfl:   •  calcipotriene-betamethasone (TACLONEX) 0.005-0.064 % ointment, Apply 1 application topically daily., Disp: , Rfl:   •  warfarin (COUMADIN) 2 MG tablet, Take one tablet daily or as directed, Disp: 30 tablet, Rfl: 3    Social History     Social History   • Marital status:      Spouse name: Kody   • Number of children: N/A   • Years of education: College     Occupational History   • Teacher, retired      [employer]     Social History Main Topics   • Smoking status: Former Smoker     Packs/day: 1.00     Years: 20.00     Types: Cigarettes   • Smokeless tobacco: Never Used      Comment: QUIT 2005   • Alcohol use Yes      Comment: 1 DRINK A WEEK   ///   caffeine use   • Drug use: No   • Sexual activity: Defer     Other Topics Concern   • Not on file     Social History Narrative       Family History   Problem Relation Age of Onset   • Endometriosis Mother    • Dementia Mother    • Hypertension Mother    • Uterine cancer Mother    • Alzheimer's disease Mother    • Tongue cancer Father    • Heart failure Maternal Grandmother    • Heart attack Maternal Grandmother    • Stroke Maternal Grandfather    • Heart disease Maternal Grandfather    • Hypertension Maternal Aunt    • Hypertension Maternal Uncle            Vital Signs:  Weight: 138 lb (62.6 kg)   Body mass index is 25.24 kg/(m^2).  Temp: 97.4 °F (36.3 °C)   Heart Rate: 69   BP: 163/78     Physical Exam   Constitutional: She is  oriented to person, place, and time. She appears well-developed and well-nourished. No distress.   HENT:   Head: Normocephalic and atraumatic.   Eyes: Pupils are equal, round, and reactive to light. No scleral icterus.   Neck: Normal range of motion. Neck supple.   Cardiovascular: Normal rate and regular rhythm.    Pulmonary/Chest: Effort normal and breath sounds normal. No respiratory distress.   No sternal instability   Abdominal: Soft. There is no tenderness.   Musculoskeletal: Normal range of motion.   Neurological: She is alert and oriented to person, place, and time.   Skin: Skin is warm and dry.   Surgical incisions well healed   Psychiatric: She has a normal mood and affect. Her behavior is normal. Judgment and thought content normal.        Recommendation/Plan:     Dory Hampton was seen for post operative follow up. She is doing well from a surgical standpoint. Her incisions are healing well. I have released her to resume normal daily activities without restrictions. So long as she continues to follow up with cardiology, she can see Dr. Jones on prn basis.                  Thank you for allowing me to participate in her care.    Sincerely,    KEVIN Gonzales

## 2017-02-08 RX ORDER — FERROUS SULFATE 325(65) MG
TABLET ORAL
Qty: 30 TABLET | Refills: 5 | Status: SHIPPED | OUTPATIENT
Start: 2017-02-08 | End: 2017-03-01 | Stop reason: SDUPTHER

## 2017-02-08 RX ORDER — WARFARIN SODIUM 2 MG/1
TABLET ORAL
Qty: 50 TABLET | Refills: 2 | Status: SHIPPED | OUTPATIENT
Start: 2017-02-08 | End: 2017-04-13

## 2017-02-09 ENCOUNTER — HOSPITAL ENCOUNTER (OUTPATIENT)
Dept: CARDIOLOGY | Facility: HOSPITAL | Age: 77
Setting detail: THERAPIES SERIES
Discharge: HOME | End: 2017-02-09

## 2017-02-09 PROCEDURE — 93798 PHYS/QHP OP CAR RHAB W/ECG: CPT

## 2017-02-13 ENCOUNTER — HOSPITAL ENCOUNTER (OUTPATIENT)
Dept: CARDIOLOGY | Facility: HOSPITAL | Age: 77
Setting detail: THERAPIES SERIES
Discharge: HOME | End: 2017-02-13

## 2017-02-13 ENCOUNTER — TELEPHONE (OUTPATIENT)
Dept: CARDIOLOGY | Facility: CLINIC | Age: 77
End: 2017-02-13

## 2017-02-13 PROCEDURE — 93798 PHYS/QHP OP CAR RHAB W/ECG: CPT

## 2017-02-13 NOTE — TELEPHONE ENCOUNTER
Pharmacy called and was asking for a refill on Pt's lasix. It was in your plan that 2 weeks following her 1/18/17 appointment she was supposed to discontinue both lasix (which was decreased to 20mg once daily) and potassium. Pharmacist reported that she was going to refuse both medications and advise the Pt to contact the office, she appears to be confused with directions. Please advise

## 2017-02-14 RX ORDER — WARFARIN SODIUM 3 MG/1
3 TABLET ORAL
Qty: 30 TABLET | Refills: 2 | Status: SHIPPED | OUTPATIENT
Start: 2017-02-14 | End: 2017-04-13

## 2017-02-15 ENCOUNTER — HOSPITAL ENCOUNTER (OUTPATIENT)
Dept: CARDIOLOGY | Facility: HOSPITAL | Age: 77
Setting detail: THERAPIES SERIES
Discharge: HOME | End: 2017-02-15

## 2017-02-15 PROCEDURE — 93798 PHYS/QHP OP CAR RHAB W/ECG: CPT

## 2017-02-17 ENCOUNTER — APPOINTMENT (OUTPATIENT)
Dept: CARDIOLOGY | Facility: HOSPITAL | Age: 77
End: 2017-02-17

## 2017-02-20 ENCOUNTER — HOSPITAL ENCOUNTER (OUTPATIENT)
Dept: CARDIOLOGY | Facility: HOSPITAL | Age: 77
Setting detail: RECURRING SERIES
Discharge: HOME | End: 2017-02-20

## 2017-02-20 ENCOUNTER — HOSPITAL ENCOUNTER (OUTPATIENT)
Dept: CARDIOLOGY | Facility: HOSPITAL | Age: 77
Setting detail: THERAPIES SERIES
Discharge: HOME | End: 2017-02-20

## 2017-02-20 PROCEDURE — 85610 PROTHROMBIN TIME: CPT

## 2017-02-20 PROCEDURE — 93798 PHYS/QHP OP CAR RHAB W/ECG: CPT

## 2017-02-20 PROCEDURE — 36416 COLLJ CAPILLARY BLOOD SPEC: CPT

## 2017-02-22 ENCOUNTER — HOSPITAL ENCOUNTER (OUTPATIENT)
Dept: CARDIOLOGY | Facility: HOSPITAL | Age: 77
Setting detail: THERAPIES SERIES
Discharge: HOME | End: 2017-02-22

## 2017-02-22 PROCEDURE — 93798 PHYS/QHP OP CAR RHAB W/ECG: CPT

## 2017-02-24 ENCOUNTER — HOSPITAL ENCOUNTER (OUTPATIENT)
Dept: CARDIOLOGY | Facility: HOSPITAL | Age: 77
Setting detail: THERAPIES SERIES
Discharge: HOME | End: 2017-02-24

## 2017-02-24 PROCEDURE — 93798 PHYS/QHP OP CAR RHAB W/ECG: CPT

## 2017-02-27 ENCOUNTER — HOSPITAL ENCOUNTER (OUTPATIENT)
Dept: CARDIOLOGY | Facility: HOSPITAL | Age: 77
Setting detail: THERAPIES SERIES
Discharge: HOME | End: 2017-02-27

## 2017-02-27 PROCEDURE — 93798 PHYS/QHP OP CAR RHAB W/ECG: CPT

## 2017-03-01 ENCOUNTER — OFFICE VISIT (OUTPATIENT)
Dept: CARDIOLOGY | Facility: CLINIC | Age: 77
End: 2017-03-01

## 2017-03-01 ENCOUNTER — HOSPITAL ENCOUNTER (OUTPATIENT)
Dept: CARDIOLOGY | Facility: HOSPITAL | Age: 77
Setting detail: THERAPIES SERIES
Discharge: HOME | End: 2017-03-01

## 2017-03-01 VITALS
HEART RATE: 52 BPM | WEIGHT: 146 LBS | HEIGHT: 62 IN | BODY MASS INDEX: 26.87 KG/M2 | SYSTOLIC BLOOD PRESSURE: 190 MMHG | DIASTOLIC BLOOD PRESSURE: 80 MMHG

## 2017-03-01 DIAGNOSIS — E78.5 HYPERLIPIDEMIA, UNSPECIFIED HYPERLIPIDEMIA TYPE: ICD-10-CM

## 2017-03-01 DIAGNOSIS — I10 ESSENTIAL HYPERTENSION: ICD-10-CM

## 2017-03-01 DIAGNOSIS — Z95.2 S/P AVR: Primary | ICD-10-CM

## 2017-03-01 DIAGNOSIS — K55.21 AVM (ARTERIOVENOUS MALFORMATION) OF SMALL BOWEL, ACQUIRED WITH HEMORRHAGE: ICD-10-CM

## 2017-03-01 DIAGNOSIS — I48.0 PAF (PAROXYSMAL ATRIAL FIBRILLATION) (HCC): ICD-10-CM

## 2017-03-01 PROCEDURE — 93798 PHYS/QHP OP CAR RHAB W/ECG: CPT

## 2017-03-01 PROCEDURE — 93000 ELECTROCARDIOGRAM COMPLETE: CPT | Performed by: INTERNAL MEDICINE

## 2017-03-01 PROCEDURE — 99214 OFFICE O/P EST MOD 30 MIN: CPT | Performed by: INTERNAL MEDICINE

## 2017-03-01 RX ORDER — LOSARTAN POTASSIUM AND HYDROCHLOROTHIAZIDE 12.5; 5 MG/1; MG/1
1 TABLET ORAL DAILY
Qty: 90 TABLET | Refills: 3 | Status: SHIPPED | OUTPATIENT
Start: 2017-03-01 | End: 2017-04-13

## 2017-03-01 NOTE — PROGRESS NOTES
Date of Office Visit: 2017  Encounter Provider: Zaria Bryan MD  Place of Service: [facility] CARDIOLOGY  Patient Name: Dory Hampton  :1940      Patient ID:  Dory Hampton is a 76 y.o. female is here for  followup for s/p AVR.         History of Present Illness    She had an echocardiogram which was performed on 2016 for a murmur. Her ejection fraction was 68% with moderate concentric left ventricular hypertrophy, normal segmental wall motion, grade II diastolic dysfunction, moderate aortic insufficiency and severe aortic stenosis. Her mean gradient was 64 mmHg with a maximum pressure gradient of 102. Her aortic valve area was calculated at 0.51. Her maximum velocity was 4 meters per second.       She has had anemia. In 2015 she presented to the emergency department at [facility] with a syncopal episode and was found to have a hemoglobin of 5. She had an extensive gastrointestinal evaluation including upper and lower endoscopy, and she did the capsule camera, and no gastrointestinal bleeding site was found. She then was transfused at that point. Then in 2016 she required a transfusion again. She was referred to Dr. Natalia Fu from [facility] hematology/oncology. Dr. Fu saw the patient and performed another transfusion on her in 2016 for a hemoglobin on 2016 that was 8.4. She said that when she was transfused in 2015 she felt good and her breathing improved, and she was not as fatigued. When she received her transfusion in 2016 she noticed no improvement in her breathing and she was still very fatigued.      She is treated for hypertension, hyperlipidemia and hypothyroidism and these have been well controlled. She quit smoking in . Her maternal grandmother had heart failure and myocardial infarctions.      The patient is  and lives with her  who has post herpetic neuralgia but otherwise is healthy. She has  three healthy children.       I evaluated her in the office and I felt she was suffering from severe symptomatic aortic stenosis, and I recommended she be worked up for this. She underwent a cardiac catheterization on 08/25/2016 and this revealed 10% mid left main stenosis, normal left circumflex, 50% mid left anterior descending artery stenosis, 30% mid RCA stenosis and severe aortic valvular stenosis. At that time she was given a referral to cardiothoracic surgery. They ordered bilateral carotid duplex studies which were obtained on 12/15/2016 and revealed mild bilateral internal carotid artery stenosis. On 12/16/2016 she underwent surgery and received a mini-sternotomy and had a 21 mm Magna pericardial prosthetic aortic valve replacement. She was in the hospital from 12/15/2016 to 12/23/2016. During that time she did have postoperative atrial fibrillation with rapid ventricular response and bilateral pleural effusions. She also did have some anemia requiring a transfusion prior to her surgery. At one point, there was talk about doing a right thoracentesis but then her pleural effusion resolved with diuretic therapy. Coumadin was administered because of atrial fibrillation postoperativel y.       At this time she is doing well.  She still has some mild dyspnea when she goes up steps.  She has had no tachycardia, dizziness or syncope.   She is tolerating her medications well.  She has had no gastrointestinal bleeding.   Her blood pressure is high today.   I did recheck it and I got 190/85.   She is not currently taking any medications for hypertension.  She is in cardiac rehabilitation.              Past Medical History   Diagnosis Date   • Abnormal electrocardiogram    • Anemia    • Aortic stenosis      severe   • Asthma      HISTORY   • Bradycardia    • Chest pain    • Coronary artery disease      heart murmer   • Diastolic dysfunction    • Environmental allergies    • Fatigue    • Female stress incontinence    •  History of diverticulosis    • History of transfusion    • Hyperlipidemia    • Hypertension    • Hypokalemia    • Hypothyroid    • Iron deficiency anemia      hemolytic anemia   • Nonrheumatic aortic (valve) stenosis    • PAF (paroxysmal atrial fibrillation)      with RVR   • Pleural effusion      bilateral, post op   • Psoriasis    • Tendinitis    • Thoracic aorta atherosclerosis    • Ventricular tachycardia      left         Past Surgical History   Procedure Laterality Date   • Dental procedure  2011     Implants   • Cataract extraction Bilateral      Left 12/10/13, Right 12/17/2013   • Colonoscopy  06/22/2015     Dr. Mohamud   • Endoscopy  06/22/2015     Dr. Mohamud   • Capsule endoscopy  2015   • Tonsillectomy     • Tubal abdominal ligation     • Appendectomy  06/1972   • Cardiac catheterization N/A 8/25/2016     Procedure: Coronary angiography;  Surgeon: Lulú Hooper MD;  Location[facility] CATH INVASIVE LOCATION;  Service:    • Cardiac catheterization N/A 8/25/2016     Procedure: Right Heart Cath;  Surgeon: Lulú Hooper MD;  Location: [facility] CATH INVASIVE LOCATION;  Service:    • Cataract extraction     • Pelvic laparoscopy     • Small bowel enteroscopy N/A 11/21/2016     Procedure: ENTEROSCOPY SMALL BOWEL WITH APC CAUTERY;  Surgeon: Tj Torres MD;  Location: [facility] ENDOSCOPY;  Service:    • Aortic valve repair/replacement N/A 12/16/2016     Procedure: BHARGAVI MINI STERNOTOMY AORTIC VALVE REPLACEMENT;  Surgeon: Robin Jones MD;  Location: [facility] MAIN OR;  Service:    • Sternotomy       mini       Current Outpatient Prescriptions on File Prior to Visit   Medication Sig Dispense Refill   • acetaminophen (TYLENOL) 325 MG tablet Take 2 tablets by mouth Every 4 (Four) Hours As Needed for mild pain (1-3).  0   • amiodarone (PACERONE) 200 MG tablet Take 1 tablet by mouth Daily.     • aspirin 81 MG EC tablet Take 1 tablet by mouth Daily.     • betamethasone valerate (VALISONE) 0.1 % cream Apply 1  application topically Daily As Needed.     • calcipotriene-betamethasone (TACLONEX) 0.005-0.064 % ointment Apply 1 application topically daily.     • clarithromycin (BIAXIN) 500 MG tablet Take 500 mg by mouth as needed. Prior to dental procedures     • Cyanocobalamin (VITAMIN B-12 PO) Take 1,000 mcg by mouth daily.     • ferrous sulfate 325 (65 FE) MG tablet Take 325 mg by mouth Daily With Breakfast. PT HOLDING FOR SURGERY     • levothyroxine (SYNTHROID, LEVOTHROID) 88 MCG tablet Take 88 mcg by mouth daily.     • metoprolol tartrate (LOPRESSOR) 25 MG tablet Take 1 tablet by mouth 2 (Two) Times a Day. 180 tablet 1   • Multiple Vitamins-Minerals (CENTRUM SILVER PO) Take 1 tablet by mouth Daily. PT HOLDING FOR SURGERY     • Pyridoxine HCl (VITAMIN B6 PO) Take 100 mg by mouth daily.     • warfarin (COUMADIN) 2 MG tablet Take 1.5 tablets daily or as directed 50 tablet 2   • warfarin (COUMADIN) 3 MG tablet Take 1 tablet by mouth Daily. 30 tablet 2   • [DISCONTINUED] ferrous sulfate 325 (65 FE) MG tablet TAKE 1 TABLET BY MOUTH DAILY 30 tablet 5   • [DISCONTINUED] furosemide (LASIX) 40 MG tablet Take 0.5 tablets by mouth Daily. 30 tablet 1     No current facility-administered medications on file prior to visit.        Social History     Social History   • Marital status:      Spouse name: Koyd   • Number of children: N/A   • Years of education: College     Occupational History   • Teacher, retired      [employer]     Social History Main Topics   • Smoking status: Former Smoker     Packs/day: 1.00     Years: 20.00     Types: Cigarettes   • Smokeless tobacco: Never Used      Comment: QUIT 2005   • Alcohol use Yes      Comment: 1 DRINK A WEEK   ///   caffeine use   • Drug use: No   • Sexual activity: Defer     Other Topics Concern   • Not on file     Social History Narrative           Review of Systems   Constitution: Negative.   HENT: Negative for congestion and headaches.    Eyes: Negative for vision loss in left eye and  "vision loss in right eye.   Respiratory: Negative.  Negative for cough, hemoptysis, shortness of breath, sleep disturbances due to breathing, snoring, sputum production and wheezing.    Endocrine: Negative.    Hematologic/Lymphatic: Negative.    Skin: Negative for poor wound healing and rash.   Musculoskeletal: Negative for falls, gout, muscle cramps and myalgias.   Gastrointestinal: Negative for abdominal pain, diarrhea, dysphagia, hematemesis, melena, nausea and vomiting.   Neurological: Negative for excessive daytime sleepiness, dizziness, light-headedness, loss of balance, seizures and vertigo.   Psychiatric/Behavioral: Negative for depression and substance abuse. The patient is not nervous/anxious.        Procedures    ECG 12 Lead  Date/Time: 3/1/2017 2:16 PM  Performed by: DYANA FIGUEROA  Authorized by: DYANA FIGUEROA   Comparison: compared with previous ECG   Similar to previous ECG  Rhythm: sinus rhythm  Clinical impression: normal ECG               Objective:      Vitals:    03/01/17 1407   BP: (!) 190/80   BP Location: Right arm   Patient Position: Sitting   Pulse: 52   Weight: 146 lb (66.2 kg)   Height: 62\" (157.5 cm)     Body mass index is 26.7 kg/(m^2).    Physical Exam   Constitutional: She is oriented to person, place, and time. She appears well-developed and well-nourished. No distress.   HENT:   Head: Normocephalic and atraumatic.   Eyes: Conjunctivae are normal. No scleral icterus.   Neck: Neck supple. No JVD present. Carotid bruit is not present. No thyromegaly present.   Cardiovascular: Normal rate, regular rhythm, S1 normal, S2 normal and intact distal pulses.   No extrasystoles are present. PMI is not displaced.  Exam reveals no gallop.    Murmur heard.   Harsh midsystolic murmur is present with a grade of 2/6  at the upper right sternal border radiating to the neck  Pulses:       Carotid pulses are 2+ on the right side, and 2+ on the left side.       Radial pulses are 2+ on the " right side, and 2+ on the left side.        Dorsalis pedis pulses are 2+ on the right side, and 2+ on the left side.        Posterior tibial pulses are 2+ on the right side, and 2+ on the left side.   Pulmonary/Chest: Effort normal and breath sounds normal. No respiratory distress. She has no wheezes. She has no rhonchi. She has no rales. She exhibits no tenderness.   Abdominal: Soft. Bowel sounds are normal. She exhibits no distension, no abdominal bruit and no mass. There is no tenderness.   Musculoskeletal: She exhibits no edema or deformity.   Lymphadenopathy:     She has no cervical adenopathy.   Neurological: She is alert and oriented to person, place, and time. No cranial nerve deficit.   Skin: Skin is warm and dry. No rash noted. She is not diaphoretic. No cyanosis. No pallor. Nails show no clubbing.   Psychiatric: She has a normal mood and affect. Judgment normal.   Vitals reviewed.      Lab Review:       Assessment:      Diagnosis Plan   1. S/P AVR     2. Hyperlipidemia, unspecified hyperlipidemia type     3. Essential hypertension     4. AVM (arteriovenous malformation) of small bowel, acquired with hemorrhage          1. Severe aortic stenosis status post tissue-type aortic valve replacement. At this time she is doing well.   2. Atrial fibrillation after her aortic valve replacement. Stop amiodaroen, place a Holter monitor to make sure she is not having any more atrial fibrillation, and then consider stopping warfarin.   3. Pleural effusions which have resolved after surgery.   4. Mild coronary artery disease.   5. History of anemia. She follows with Dr. Fu but she has not seen her in some time, so she may need a followup with her. Her last hemoglobin was about 10. I have asked her to get a BMP and CBC with Dr. Billy.   6. HTN, start hyzaar 50/12.5mg daily.      Plan:       See back in 6 weeks.

## 2017-03-03 ENCOUNTER — TELEPHONE (OUTPATIENT)
Dept: CARDIOLOGY | Facility: CLINIC | Age: 77
End: 2017-03-03

## 2017-03-03 ENCOUNTER — HOSPITAL ENCOUNTER (OUTPATIENT)
Dept: CARDIOLOGY | Facility: HOSPITAL | Age: 77
Setting detail: THERAPIES SERIES
Discharge: HOME | End: 2017-03-03

## 2017-03-03 PROCEDURE — 93798 PHYS/QHP OP CAR RHAB W/ECG: CPT

## 2017-03-03 NOTE — TELEPHONE ENCOUNTER
----- Message from Zaria Bryan MD sent at 3/3/2017 12:22 PM EST -----  pls call and let her know taht her holter was normal.

## 2017-03-06 ENCOUNTER — APPOINTMENT (OUTPATIENT)
Dept: CARDIOLOGY | Facility: HOSPITAL | Age: 77
End: 2017-03-06

## 2017-03-06 ENCOUNTER — HOSPITAL ENCOUNTER (OUTPATIENT)
Dept: CARDIOLOGY | Facility: HOSPITAL | Age: 77
Setting detail: RECURRING SERIES
Discharge: HOME | End: 2017-03-06

## 2017-03-06 PROCEDURE — 85610 PROTHROMBIN TIME: CPT

## 2017-03-06 PROCEDURE — 36416 COLLJ CAPILLARY BLOOD SPEC: CPT

## 2017-03-08 ENCOUNTER — HOSPITAL ENCOUNTER (OUTPATIENT)
Dept: CARDIOLOGY | Facility: HOSPITAL | Age: 77
Setting detail: THERAPIES SERIES
Discharge: HOME | End: 2017-03-08

## 2017-03-08 ENCOUNTER — TELEPHONE (OUTPATIENT)
Dept: CARDIOLOGY | Facility: CLINIC | Age: 77
End: 2017-03-08

## 2017-03-08 PROCEDURE — 93798 PHYS/QHP OP CAR RHAB W/ECG: CPT

## 2017-03-08 NOTE — TELEPHONE ENCOUNTER
Pt drop a note today stating that she received the HCTZ and losartan on March 4th but have not take the medicine because the information on the medicine said not to take it if allergic to sulfa drugs and PCN. Pt is allergic on both... Please advise    Thanks   Anna LIU

## 2017-03-10 ENCOUNTER — HOSPITAL ENCOUNTER (OUTPATIENT)
Dept: CARDIOLOGY | Facility: HOSPITAL | Age: 77
Setting detail: THERAPIES SERIES
Discharge: HOME | End: 2017-03-10

## 2017-03-10 PROCEDURE — 93798 PHYS/QHP OP CAR RHAB W/ECG: CPT

## 2017-03-13 ENCOUNTER — HOSPITAL ENCOUNTER (OUTPATIENT)
Dept: CARDIOLOGY | Facility: HOSPITAL | Age: 77
Setting detail: THERAPIES SERIES
Discharge: HOME | End: 2017-03-13

## 2017-03-13 ENCOUNTER — HOSPITAL ENCOUNTER (OUTPATIENT)
Dept: CARDIOLOGY | Facility: HOSPITAL | Age: 77
Setting detail: RECURRING SERIES
Discharge: HOME | End: 2017-03-13

## 2017-03-13 PROCEDURE — 93798 PHYS/QHP OP CAR RHAB W/ECG: CPT

## 2017-03-13 PROCEDURE — 85610 PROTHROMBIN TIME: CPT

## 2017-03-13 PROCEDURE — 36416 COLLJ CAPILLARY BLOOD SPEC: CPT

## 2017-03-13 NOTE — TELEPHONE ENCOUNTER
S/w pt. She said that she has not had sulfa and pcn for years and that if she remembers correctly, they both broke her out and her dr had told her never to take it.

## 2017-03-15 ENCOUNTER — HOSPITAL ENCOUNTER (OUTPATIENT)
Dept: CARDIOLOGY | Facility: HOSPITAL | Age: 77
Setting detail: THERAPIES SERIES
Discharge: HOME | End: 2017-03-15

## 2017-03-15 PROCEDURE — 93798 PHYS/QHP OP CAR RHAB W/ECG: CPT

## 2017-03-17 ENCOUNTER — HOSPITAL ENCOUNTER (OUTPATIENT)
Dept: CARDIOLOGY | Facility: HOSPITAL | Age: 77
Setting detail: THERAPIES SERIES
Discharge: HOME | End: 2017-03-17

## 2017-03-17 PROCEDURE — 93798 PHYS/QHP OP CAR RHAB W/ECG: CPT

## 2017-03-20 ENCOUNTER — APPOINTMENT (OUTPATIENT)
Dept: CARDIOLOGY | Facility: HOSPITAL | Age: 77
End: 2017-03-20

## 2017-03-22 ENCOUNTER — HOSPITAL ENCOUNTER (OUTPATIENT)
Dept: CARDIOLOGY | Facility: HOSPITAL | Age: 77
Setting detail: THERAPIES SERIES
Discharge: HOME | End: 2017-03-22

## 2017-03-22 PROCEDURE — 93798 PHYS/QHP OP CAR RHAB W/ECG: CPT

## 2017-03-24 ENCOUNTER — HOSPITAL ENCOUNTER (OUTPATIENT)
Dept: CARDIOLOGY | Facility: HOSPITAL | Age: 77
Setting detail: THERAPIES SERIES
Discharge: HOME | End: 2017-03-24

## 2017-03-24 PROCEDURE — 93798 PHYS/QHP OP CAR RHAB W/ECG: CPT

## 2017-03-27 ENCOUNTER — HOSPITAL ENCOUNTER (OUTPATIENT)
Dept: CARDIOLOGY | Facility: HOSPITAL | Age: 77
Setting detail: THERAPIES SERIES
Discharge: HOME | End: 2017-03-27

## 2017-03-27 PROCEDURE — 93798 PHYS/QHP OP CAR RHAB W/ECG: CPT

## 2017-03-29 ENCOUNTER — APPOINTMENT (OUTPATIENT)
Dept: CARDIOLOGY | Facility: HOSPITAL | Age: 77
End: 2017-03-29

## 2017-03-31 ENCOUNTER — APPOINTMENT (OUTPATIENT)
Dept: CARDIOLOGY | Facility: HOSPITAL | Age: 77
End: 2017-03-31

## 2017-04-03 ENCOUNTER — APPOINTMENT (OUTPATIENT)
Dept: CARDIOLOGY | Facility: HOSPITAL | Age: 77
End: 2017-04-03

## 2017-04-05 ENCOUNTER — APPOINTMENT (OUTPATIENT)
Dept: CARDIOLOGY | Facility: HOSPITAL | Age: 77
End: 2017-04-05

## 2017-04-07 ENCOUNTER — HOSPITAL ENCOUNTER (OUTPATIENT)
Dept: CARDIOLOGY | Facility: HOSPITAL | Age: 77
Setting detail: THERAPIES SERIES
Discharge: HOME | End: 2017-04-07

## 2017-04-07 PROCEDURE — 93798 PHYS/QHP OP CAR RHAB W/ECG: CPT

## 2017-04-10 ENCOUNTER — APPOINTMENT (OUTPATIENT)
Dept: CARDIOLOGY | Facility: HOSPITAL | Age: 77
End: 2017-04-10

## 2017-04-12 ENCOUNTER — APPOINTMENT (OUTPATIENT)
Dept: CARDIOLOGY | Facility: HOSPITAL | Age: 77
End: 2017-04-12

## 2017-04-12 ENCOUNTER — HOSPITAL ENCOUNTER (OUTPATIENT)
Dept: CARDIOLOGY | Facility: HOSPITAL | Age: 77
Setting detail: THERAPIES SERIES
Discharge: HOME | End: 2017-04-12

## 2017-04-12 PROCEDURE — 93798 PHYS/QHP OP CAR RHAB W/ECG: CPT

## 2017-04-13 ENCOUNTER — OFFICE VISIT (OUTPATIENT)
Dept: CARDIOLOGY | Facility: CLINIC | Age: 77
End: 2017-04-13

## 2017-04-13 VITALS
HEIGHT: 62 IN | WEIGHT: 146.2 LBS | BODY MASS INDEX: 26.91 KG/M2 | SYSTOLIC BLOOD PRESSURE: 170 MMHG | DIASTOLIC BLOOD PRESSURE: 70 MMHG | HEART RATE: 60 BPM

## 2017-04-13 DIAGNOSIS — E78.5 HYPERLIPIDEMIA, UNSPECIFIED HYPERLIPIDEMIA TYPE: ICD-10-CM

## 2017-04-13 DIAGNOSIS — Z95.2 S/P AVR: Primary | ICD-10-CM

## 2017-04-13 DIAGNOSIS — I10 ESSENTIAL HYPERTENSION: ICD-10-CM

## 2017-04-13 PROCEDURE — 93000 ELECTROCARDIOGRAM COMPLETE: CPT | Performed by: INTERNAL MEDICINE

## 2017-04-13 PROCEDURE — 99214 OFFICE O/P EST MOD 30 MIN: CPT | Performed by: INTERNAL MEDICINE

## 2017-04-13 RX ORDER — LOSARTAN POTASSIUM AND HYDROCHLOROTHIAZIDE 25; 100 MG/1; MG/1
1 TABLET ORAL DAILY
Qty: 90 TABLET | Refills: 3 | Status: SHIPPED | OUTPATIENT
Start: 2017-04-13 | End: 2018-02-28

## 2017-04-13 NOTE — PROGRESS NOTES
Date of Office Visit: 2017  Encounter Provider: Zaria Bryan MD  Place of Service: [facility] CARDIOLOGY  Patient Name: Dory Hampton  :1940      Patient ID:  Dory Hampton is a 76 y.o. female is here for  followup for s/p AVR for AS.         History of Present Illness    She had an echocardiogram which was performed on 2016 for a murmur. Her ejection fraction was 68% with moderate concentric left ventricular hypertrophy, normal segmental wall motion, grade II diastolic dysfunction, moderate aortic insufficiency and severe aortic stenosis. Her mean gradient was 64 mmHg with a maximum pressure gradient of 102. Her aortic valve area was calculated at 0.51. Her maximum velocity was 4 meters per second.       She has had anemia. In 2015 she presented to the emergency department at [facility] with a syncopal episode and was found to have a hemoglobin of 5. She had an extensive gastrointestinal evaluation including upper and lower endoscopy, and she did the capsule camera, and no gastrointestinal bleeding site was found. She then was transfused at that point. Then in 2016 she required a transfusion again. She was referred to Dr. Natalia Fu from [facility] hematology/oncology. Dr. Fu saw the patient and performed another transfusion on her in 2016 for a hemoglobin on 2016 that was 8.4. She said that when she was transfused in 2015 she felt good and her breathing improved, and she was not as fatigued. When she received her transfusion in 2016 she noticed no improvement in her breathing and she was still very fatigued.      She is treated for hypertension, hyperlipidemia and hypothyroidism and these have been well controlled. She quit smoking in . Her maternal grandmother had heart failure and myocardial infarctions.       The patient is  and lives with her  who has post herpetic neuralgia but otherwise is healthy.  She has three healthy children.       I evaluated her in the office and I felt she was suffering from severe symptomatic aortic stenosis, and I recommended she be worked up for this. She underwent a cardiac catheterization on 08/25/2016 and this revealed 10% mid left main stenosis, normal left circumflex, 50% mid left anterior descending artery stenosis, 30% mid RCA stenosis and severe aortic valvular stenosis. At that time she was given a referral to cardiothoracic surgery. They ordered bilateral carotid duplex studies which were obtained on 12/15/2016 and revealed mild bilateral internal carotid artery stenosis. On 12/16/2016 she underwent surgery and received a mini-sternotomy and had a 21 mm Magna pericardial prosthetic aortic valve replacement. She was in the hospital from 12/15/2016 to 12/23/2016. During that time she did have postoperative atrial fibrillation with rapid ventricular response and bilateral pleural effusions. She also did have some anemia requiring a transfusion prior to her surgery. At one point, there was talk about doing a right thoracentesis but then her pleural effusion resolved with diuretic therapy. Coumadin was administered because of atrial fibrillation postoperatively.        At her last visit, her blood pressure was high so we started Hyzaar 50/12.5 mg daily.  Her blood pressure was 190/85.  Today, it is still high at 170/70.  In addition, I stopped her amiodarone at her visit in early 03/2017.  She had a Holter monitor done after this to make sure that she was not having any atrial fibrillation and this was normal.  At this time, she does remain on warfarin.   She has had no documented atrial fibrillation since surgery.     She continues her cardiac rehabilitation.  Her blood pressure is still high today.  They have not documented in any atrial fibrillation.  She has discontinued her warfarin.  She has had no dizziness or syncope.  Her energy level is coming back.  Her breathing has  been stable.  She has no chest pain.  her appetite is good.        Past Medical History:   Diagnosis Date   • Abnormal electrocardiogram    • Anemia    • Aortic stenosis     severe   • Asthma     HISTORY   • Bradycardia    • Chest pain    • Coronary artery disease     heart murmer   • Diastolic dysfunction    • Environmental allergies    • Fatigue    • Female stress incontinence    • History of diverticulosis    • History of transfusion    • Hyperlipidemia    • Hypertension    • Hypokalemia    • Hypothyroid    • Iron deficiency anemia     hemolytic anemia   • Nonrheumatic aortic (valve) stenosis    • PAF (paroxysmal atrial fibrillation)     with RVR   • Pleural effusion     bilateral, post op   • Psoriasis    • Tendinitis    • Thoracic aorta atherosclerosis    • Ventricular tachycardia     left         Past Surgical History:   Procedure Laterality Date   • AORTIC VALVE REPAIR/REPLACEMENT N/A 12/16/2016    Procedure: BHARGAVI MINI STERNOTOMY AORTIC VALVE REPLACEMENT;  Surgeon: Robin Jones MD;  Location: [facility] MAIN OR;  Service:    • APPENDECTOMY  06/1972   • CAPSULE ENDOSCOPY  2015   • CARDIAC CATHETERIZATION N/A 8/25/2016    Procedure: Coronary angiography;  Surgeon: Lulú Hooper MD;  Location: [facility] CATH INVASIVE LOCATION;  Service:    • CARDIAC CATHETERIZATION N/A 8/25/2016    Procedure: Right Heart Cath;  Surgeon: Lulú Hooper MD;  Location: [facility] CATH INVASIVE LOCATION;  Service:    • CATARACT EXTRACTION Bilateral     Left 12/10/13, Right 12/17/2013   • CATARACT EXTRACTION     • COLONOSCOPY  06/22/2015    Dr. Mohamud   • DENTAL PROCEDURE  2011 Implants   • ENDOSCOPY  06/22/2015    Dr. Mohamud   • ENTEROSCOPY SMALL BOWEL N/A 11/21/2016    Procedure: ENTEROSCOPY SMALL BOWEL WITH APC CAUTERY;  Surgeon: Tj Torres MD;  Location: [facility] ENDOSCOPY;  Service:    • PELVIC LAPAROSCOPY     • STERNOTOMY      mini   • TONSILLECTOMY     • TUBAL ABDOMINAL LIGATION         Current Outpatient Prescriptions on  File Prior to Visit   Medication Sig Dispense Refill   • acetaminophen (TYLENOL) 325 MG tablet Take 2 tablets by mouth Every 4 (Four) Hours As Needed for mild pain (1-3).  0   • aspirin 81 MG EC tablet Take 1 tablet by mouth Daily.     • betamethasone valerate (VALISONE) 0.1 % cream Apply 1 application topically Daily As Needed.     • calcipotriene-betamethasone (TACLONEX) 0.005-0.064 % ointment Apply 1 application topically daily.     • clarithromycin (BIAXIN) 500 MG tablet Take 500 mg by mouth as needed. Prior to dental procedures     • Cyanocobalamin (VITAMIN B-12 PO) Take 1,000 mcg by mouth daily.     • ferrous sulfate 325 (65 FE) MG tablet Take 325 mg by mouth Daily With Breakfast. PT HOLDING FOR SURGERY     • levothyroxine (SYNTHROID, LEVOTHROID) 88 MCG tablet Take 88 mcg by mouth daily.     • losartan-hydrochlorothiazide (HYZAAR) 50-12.5 MG per tablet Take 1 tablet by mouth Daily. 90 tablet 3   • metoprolol tartrate (LOPRESSOR) 25 MG tablet Take 1 tablet by mouth 2 (Two) Times a Day. 180 tablet 1   • Multiple Vitamins-Minerals (CENTRUM SILVER PO) Take 1 tablet by mouth Daily. PT HOLDING FOR SURGERY     • Pyridoxine HCl (VITAMIN B6 PO) Take 100 mg by mouth daily.     • warfarin (COUMADIN) 2 MG tablet Take 1.5 tablets daily or as directed 50 tablet 2   • warfarin (COUMADIN) 3 MG tablet Take 1 tablet by mouth Daily. 30 tablet 2     No current facility-administered medications on file prior to visit.        Social History     Social History   • Marital status:      Spouse name: Kody   • Number of children: N/A   • Years of education: College     Occupational History   • Teacher, retired      [employer]     Social History Main Topics   • Smoking status: Former Smoker     Packs/day: 1.00     Years: 20.00     Types: Cigarettes   • Smokeless tobacco: Never Used      Comment: QUIT 2005   • Alcohol use Yes      Comment: 1 DRINK A WEEK   ///   caffeine use   • Drug use: No   • Sexual activity: Defer     Other Topics  "Concern   • Not on file     Social History Narrative           Review of Systems   Constitution: Negative.   HENT: Negative for congestion and headaches.    Eyes: Negative for vision loss in left eye and vision loss in right eye.   Respiratory: Negative.  Negative for cough, hemoptysis, shortness of breath, sleep disturbances due to breathing, snoring, sputum production and wheezing.    Endocrine: Negative.    Hematologic/Lymphatic: Negative.    Skin: Negative for poor wound healing and rash.   Musculoskeletal: Negative for falls, gout, muscle cramps and myalgias.   Gastrointestinal: Negative for abdominal pain, diarrhea, dysphagia, hematemesis, melena, nausea and vomiting.   Neurological: Negative for excessive daytime sleepiness, dizziness, light-headedness, loss of balance, seizures and vertigo.   Psychiatric/Behavioral: Negative for depression and substance abuse. The patient is not nervous/anxious.        Procedures    ECG 12 Lead  Date/Time: 4/13/2017 1:47 PM  Performed by: DYANA FIGUEROA  Authorized by: DYANA FIGUEROA   Comparison: compared with previous ECG   Similar to previous ECG  Rhythm: sinus rhythm  Clinical impression: normal ECG               Objective:      Vitals:    04/13/17 1330   BP: 170/70   BP Location: Right arm   Patient Position: Sitting   Pulse: 60   Weight: 146 lb 3.2 oz (66.3 kg)   Height: 62\" (157.5 cm)     Body mass index is 26.74 kg/(m^2).    Physical Exam   Constitutional: She is oriented to person, place, and time. She appears well-developed and well-nourished. No distress.   HENT:   Head: Normocephalic and atraumatic.   Eyes: Conjunctivae are normal. No scleral icterus.   Neck: Neck supple. No JVD present. Carotid bruit is not present. No thyromegaly present.   Cardiovascular: Normal rate, regular rhythm, S1 normal, S2 normal and intact distal pulses.   No extrasystoles are present. PMI is not displaced.  Exam reveals no gallop.    Murmur heard.   Midsystolic murmur is " present with a grade of 3/6  at the upper right sternal border, upper left sternal border  Pulses:       Carotid pulses are 2+ on the right side, and 2+ on the left side.       Radial pulses are 2+ on the right side, and 2+ on the left side.        Dorsalis pedis pulses are 2+ on the right side, and 2+ on the left side.        Posterior tibial pulses are 2+ on the right side, and 2+ on the left side.   Pulmonary/Chest: Effort normal and breath sounds normal. No respiratory distress. She has no wheezes. She has no rhonchi. She has no rales. She exhibits no tenderness.   Abdominal: Soft. Bowel sounds are normal. She exhibits no distension, no abdominal bruit and no mass. There is no tenderness.   Musculoskeletal: She exhibits no edema or deformity.   Lymphadenopathy:     She has no cervical adenopathy.   Neurological: She is alert and oriented to person, place, and time. No cranial nerve deficit.   Skin: Skin is warm and dry. No rash noted. She is not diaphoretic. No cyanosis. No pallor. Nails show no clubbing.   Psychiatric: She has a normal mood and affect. Judgment normal.   Vitals reviewed.      Lab Review:       Assessment:      Diagnosis Plan   1. S/P AVR     2. Essential hypertension     3. Hyperlipidemia, unspecified hyperlipidemia type          1. Severe aortic stenosis status post tissue-type aortic valve replacement. At this time she is doing well.   2. Atrial fibrillation after her aortic valve replacement.  Holter monitor 3/2017 shows no a fib and no a fib documented in cardiac rehab, stop warfarin.   3. Pleural effusions which have resolved after surgery.   4. Mild coronary artery disease.   5. History of anemia. She follows with Dr. Fu    6. HTN, increase hyzaar to 100/25mg daily.          Plan:       See back in 3 months

## 2017-04-14 ENCOUNTER — HOSPITAL ENCOUNTER (OUTPATIENT)
Dept: CARDIOLOGY | Facility: HOSPITAL | Age: 77
Setting detail: THERAPIES SERIES
Discharge: HOME | End: 2017-04-14

## 2017-04-14 ENCOUNTER — APPOINTMENT (OUTPATIENT)
Dept: CARDIOLOGY | Facility: HOSPITAL | Age: 77
End: 2017-04-14

## 2017-04-14 PROCEDURE — 93798 PHYS/QHP OP CAR RHAB W/ECG: CPT

## 2017-04-17 ENCOUNTER — HOSPITAL ENCOUNTER (OUTPATIENT)
Dept: CARDIOLOGY | Facility: HOSPITAL | Age: 77
Setting detail: THERAPIES SERIES
Discharge: HOME | End: 2017-04-17

## 2017-04-17 ENCOUNTER — APPOINTMENT (OUTPATIENT)
Dept: CARDIOLOGY | Facility: HOSPITAL | Age: 77
End: 2017-04-17

## 2017-04-17 PROCEDURE — 93798 PHYS/QHP OP CAR RHAB W/ECG: CPT

## 2017-04-19 ENCOUNTER — APPOINTMENT (OUTPATIENT)
Dept: CARDIOLOGY | Facility: HOSPITAL | Age: 77
End: 2017-04-19

## 2017-04-19 ENCOUNTER — HOSPITAL ENCOUNTER (OUTPATIENT)
Dept: CARDIOLOGY | Facility: HOSPITAL | Age: 77
Setting detail: THERAPIES SERIES
Discharge: HOME | End: 2017-04-19

## 2017-04-19 PROCEDURE — 93798 PHYS/QHP OP CAR RHAB W/ECG: CPT

## 2017-04-21 ENCOUNTER — APPOINTMENT (OUTPATIENT)
Dept: CARDIOLOGY | Facility: HOSPITAL | Age: 77
End: 2017-04-21

## 2017-04-21 ENCOUNTER — HOSPITAL ENCOUNTER (OUTPATIENT)
Dept: CARDIOLOGY | Facility: HOSPITAL | Age: 77
Setting detail: THERAPIES SERIES
Discharge: HOME | End: 2017-04-21

## 2017-04-21 PROCEDURE — 93798 PHYS/QHP OP CAR RHAB W/ECG: CPT

## 2017-04-24 ENCOUNTER — HOSPITAL ENCOUNTER (OUTPATIENT)
Dept: CARDIOLOGY | Facility: HOSPITAL | Age: 77
Setting detail: THERAPIES SERIES
Discharge: HOME | End: 2017-04-24

## 2017-04-24 ENCOUNTER — APPOINTMENT (OUTPATIENT)
Dept: CARDIOLOGY | Facility: HOSPITAL | Age: 77
End: 2017-04-24

## 2017-04-24 PROCEDURE — 93798 PHYS/QHP OP CAR RHAB W/ECG: CPT

## 2017-04-26 ENCOUNTER — HOSPITAL ENCOUNTER (OUTPATIENT)
Dept: CARDIOLOGY | Facility: HOSPITAL | Age: 77
Setting detail: THERAPIES SERIES
Discharge: HOME | End: 2017-04-26

## 2017-04-26 PROCEDURE — 93798 PHYS/QHP OP CAR RHAB W/ECG: CPT

## 2017-04-28 ENCOUNTER — HOSPITAL ENCOUNTER (OUTPATIENT)
Dept: CARDIOLOGY | Facility: HOSPITAL | Age: 77
Setting detail: THERAPIES SERIES
Discharge: HOME | End: 2017-04-28

## 2017-04-28 PROCEDURE — 93798 PHYS/QHP OP CAR RHAB W/ECG: CPT

## 2017-05-01 ENCOUNTER — HOSPITAL ENCOUNTER (OUTPATIENT)
Dept: CARDIOLOGY | Facility: HOSPITAL | Age: 77
Setting detail: THERAPIES SERIES
Discharge: HOME | End: 2017-05-01

## 2017-05-01 PROCEDURE — 93798 PHYS/QHP OP CAR RHAB W/ECG: CPT

## 2017-05-03 ENCOUNTER — HOSPITAL ENCOUNTER (OUTPATIENT)
Dept: CARDIOLOGY | Facility: HOSPITAL | Age: 77
Setting detail: THERAPIES SERIES
Discharge: HOME | End: 2017-05-03

## 2017-05-03 PROCEDURE — 93798 PHYS/QHP OP CAR RHAB W/ECG: CPT

## 2017-05-03 RX ORDER — LEVOTHYROXINE SODIUM 88 MCG
TABLET ORAL
Qty: 90 TABLET | Refills: 3 | Status: SHIPPED | OUTPATIENT
Start: 2017-05-03 | End: 2018-04-28 | Stop reason: SDUPTHER

## 2017-05-08 ENCOUNTER — HOSPITAL ENCOUNTER (OUTPATIENT)
Dept: CARDIOLOGY | Facility: HOSPITAL | Age: 77
Setting detail: THERAPIES SERIES
Discharge: HOME | End: 2017-05-08

## 2017-05-08 PROCEDURE — 93798 PHYS/QHP OP CAR RHAB W/ECG: CPT

## 2017-05-10 ENCOUNTER — HOSPITAL ENCOUNTER (OUTPATIENT)
Dept: CARDIOLOGY | Facility: HOSPITAL | Age: 77
Setting detail: THERAPIES SERIES
Discharge: HOME | End: 2017-05-10

## 2017-05-10 PROCEDURE — 93798 PHYS/QHP OP CAR RHAB W/ECG: CPT

## 2017-05-31 ENCOUNTER — OFFICE VISIT (OUTPATIENT)
Dept: INTERNAL MEDICINE | Facility: CLINIC | Age: 77
End: 2017-05-31

## 2017-05-31 VITALS
WEIGHT: 147 LBS | DIASTOLIC BLOOD PRESSURE: 70 MMHG | HEART RATE: 72 BPM | SYSTOLIC BLOOD PRESSURE: 150 MMHG | BODY MASS INDEX: 27.05 KG/M2 | HEIGHT: 62 IN

## 2017-05-31 DIAGNOSIS — I10 ESSENTIAL HYPERTENSION: Primary | ICD-10-CM

## 2017-05-31 DIAGNOSIS — E03.9 HYPOTHYROIDISM, ADULT: ICD-10-CM

## 2017-05-31 DIAGNOSIS — E78.5 HYPERLIPIDEMIA, UNSPECIFIED HYPERLIPIDEMIA TYPE: ICD-10-CM

## 2017-05-31 LAB
ALBUMIN SERPL-MCNC: 3.96 G/DL (ref 3.4–4.6)
ALBUMIN/GLOB SERPL: 1.3 G/DL
ALP SERPL-CCNC: 74 U/L (ref 46–116)
ALT SERPL W P-5'-P-CCNC: 18 U/L (ref 14–59)
ANION GAP SERPL CALCULATED.3IONS-SCNC: 9 MMOL/L
AST SERPL-CCNC: 27 U/L (ref 7–37)
BILIRUB SERPL-MCNC: 0.5 MG/DL (ref 0.2–1)
BUN BLD-MCNC: 25 MG/DL (ref 6–22)
BUN/CREAT SERPL: 21 (ref 7–25)
CALCIUM SPEC-SCNC: 9.5 MG/DL (ref 8.6–10.5)
CHLORIDE SERPL-SCNC: 104 MMOL/L (ref 95–107)
CHOLEST SERPL-MCNC: 222 MG/DL (ref 0–200)
CO2 SERPL-SCNC: 32 MMOL/L (ref 23–32)
CREAT BLD-MCNC: 1.19 MG/DL (ref 0.55–1.02)
DEPRECATED RDW RBC AUTO: 56.5 FL (ref 37–54)
ERYTHROCYTE [DISTWIDTH] IN BLOOD BY AUTOMATED COUNT: 16.2 % (ref 11.5–15)
GFR SERPL CREATININE-BSD FRML MDRD: 44 ML/MIN/1.73
GLOBULIN UR ELPH-MCNC: 3 GM/DL
GLUCOSE BLD-MCNC: 105 MG/DL (ref 70–100)
HCT VFR BLD AUTO: 35 % (ref 34.1–44.9)
HDLC SERPL-MCNC: 71 MG/DL (ref 40–81)
HGB BLD-MCNC: 11.2 G/DL (ref 11.2–15.7)
LDLC SERPL CALC-MCNC: 134 MG/DL (ref 0–100)
LDLC/HDLC SERPL: 1.89 {RATIO}
MCH RBC QN AUTO: 32.1 PG (ref 26–34)
MCHC RBC AUTO-ENTMCNC: 32 G/DL (ref 31–37)
MCV RBC AUTO: 100.3 FL (ref 80–100)
PLATELET # BLD AUTO: 219 10*3/MM3 (ref 150–450)
PMV BLD AUTO: 9.7 FL (ref 6–12)
POTASSIUM BLD-SCNC: 4.1 MMOL/L (ref 3.3–5.3)
PROT SERPL-MCNC: 7 G/DL (ref 6.3–8.4)
RBC # BLD AUTO: 3.49 10*6/MM3 (ref 3.93–5.22)
SODIUM BLD-SCNC: 145 MMOL/L (ref 136–145)
TRIGL SERPL-MCNC: 84 MG/DL (ref 0–150)
VLDLC SERPL-MCNC: 16.8 MG/DL
WBC NRBC COR # BLD: 6.32 10*3/MM3 (ref 5–10)

## 2017-05-31 PROCEDURE — 80053 COMPREHEN METABOLIC PANEL: CPT | Performed by: INTERNAL MEDICINE

## 2017-05-31 PROCEDURE — 99214 OFFICE O/P EST MOD 30 MIN: CPT | Performed by: INTERNAL MEDICINE

## 2017-05-31 PROCEDURE — 85027 COMPLETE CBC AUTOMATED: CPT | Performed by: INTERNAL MEDICINE

## 2017-05-31 PROCEDURE — 80061 LIPID PANEL: CPT | Performed by: INTERNAL MEDICINE

## 2017-05-31 RX ORDER — CLARITHROMYCIN 500 MG/1
500 TABLET, COATED ORAL AS NEEDED
Qty: 2 TABLET | Refills: 0 | Status: SHIPPED | OUTPATIENT
Start: 2017-05-31 | End: 2018-02-14 | Stop reason: SDUPTHER

## 2017-08-02 ENCOUNTER — OFFICE VISIT (OUTPATIENT)
Dept: CARDIOLOGY | Facility: CLINIC | Age: 77
End: 2017-08-02

## 2017-08-02 VITALS
DIASTOLIC BLOOD PRESSURE: 60 MMHG | HEART RATE: 68 BPM | BODY MASS INDEX: 27.57 KG/M2 | HEIGHT: 62 IN | WEIGHT: 149.8 LBS | SYSTOLIC BLOOD PRESSURE: 140 MMHG

## 2017-08-02 DIAGNOSIS — D50.0 IRON DEFICIENCY ANEMIA DUE TO CHRONIC BLOOD LOSS: ICD-10-CM

## 2017-08-02 DIAGNOSIS — E78.5 HYPERLIPIDEMIA, UNSPECIFIED HYPERLIPIDEMIA TYPE: ICD-10-CM

## 2017-08-02 DIAGNOSIS — Z95.2 S/P AVR: Primary | ICD-10-CM

## 2017-08-02 DIAGNOSIS — I10 ESSENTIAL HYPERTENSION: ICD-10-CM

## 2017-08-02 PROCEDURE — 93000 ELECTROCARDIOGRAM COMPLETE: CPT | Performed by: INTERNAL MEDICINE

## 2017-08-02 PROCEDURE — 99214 OFFICE O/P EST MOD 30 MIN: CPT | Performed by: INTERNAL MEDICINE

## 2017-08-02 NOTE — PROGRESS NOTES
Date of Office Visit: 2017  Encounter Provider: Zaria Bryan MD  Place of Service: [facility] CARDIOLOGY  Patient Name: Dory Hampton  :1940      Patient ID:  Dory Hampton is a 76 y.o. female is here for  followup for         History of Present Illness    She had an echocardiogram which was performed on 2016 for a murmur. Her ejection fraction was 68% with moderate concentric left ventricular hypertrophy, normal segmental wall motion, grade II diastolic dysfunction, moderate aortic insufficiency and severe aortic stenosis. Her mean gradient was 64 mmHg with a maximum pressure gradient of 102. Her aortic valve area was calculated at 0.51. Her maximum velocity was 4 meters per second.       She has had anemia. In 2015 she presented to the emergency department at [facility] with a syncopal episode and was found to have a hemoglobin of 5. She had an extensive gastrointestinal evaluation including upper and lower endoscopy, and she did the capsule camera, and no gastrointestinal bleeding site was found. She then was transfused at that point. Then in 2016 she required a transfusion again. She was referred to Dr. Natalia Fu from [facility] hematology/oncology. Dr. Fu saw the patient and performed another transfusion on her in 2016 for a hemoglobin on 2016 that was 8.4. She said that when she was transfused in 2015 she felt good and her breathing improved, and she was not as fatigued. When she received her transfusion in 2016 she noticed no improvement in her breathing and she was still very fatigued.      She is treated for hypertension, hyperlipidemia and hypothyroidism and these have been well controlled. She quit smoking in . Her maternal grandmother had heart failure and myocardial infarctions.       The patient is  and lives with her  who has post herpetic neuralgia but otherwise is healthy. She has three  healthy children.       I evaluated her in the office and I felt she was suffering from severe symptomatic aortic stenosis, and I recommended she be worked up for this. She underwent a cardiac catheterization on 08/25/2016 and this revealed 10% mid left main stenosis, normal left circumflex, 50% mid left anterior descending artery stenosis, 30% mid RCA stenosis and severe aortic valvular stenosis. At that time she was given a referral to cardiothoracic surgery. They ordered bilateral carotid duplex studies which were obtained on 12/15/2016 and revealed mild bilateral internal carotid artery stenosis. On 12/16/2016 she underwent surgery and received a mini-sternotomy and had a 21 mm Magna pericardial prosthetic aortic valve replacement. She was in the hospital from 12/15/2016 to 12/23/2016. During that time she did have postoperative atrial fibrillation with rapid ventricular response and bilateral pleural effusions. She also did have some anemia requiring a transfusion prior to her surgery. At one point, there was talk about doing a right thoracentesis but then her pleural effusion resolved with diuretic therapy. Coumadin was administered because of atrial fibrillation postoperatively.      I stopped her amiodarone at her visit in early 03/2017.  She had a Holter monitor done after this to make sure that she was not having any atrial fibrillation and this was normal.  her warfarin was discontinued.   She has had no documented atrial fibrillation since surgery.     She is doing much better.  She has no tachycardia or palpitations.  She has dizziness after taking her iron.  She's had no falls or syncope.  She has no chest pain or difficulty breathing.  She has no orthopnea or PND.  She feels well.    Past Medical History:   Diagnosis Date   • Abnormal electrocardiogram    • Anemia    • Aortic stenosis     severe   • Asthma     HISTORY   • Bradycardia    • Chest pain    • Coronary artery disease     heart murmer   •  Diastolic dysfunction     grade II   • Environmental allergies    • Essential hypertension    • Fatigue    • Female stress incontinence    • History of diverticulosis    • History of transfusion    • Hyperlipidemia    • Hypertension    • Hypokalemia    • Hypothyroid    • Iron deficiency anemia     hemolytic anemia   • Left ventricular hypertrophy    • Murmur    • Nonrheumatic aortic (valve) stenosis    • PAF (paroxysmal atrial fibrillation)     with RVR   • Pleural effusion     bilateral, post op   • Psoriasis    • Tendinitis    • Thoracic aorta atherosclerosis          Past Surgical History:   Procedure Laterality Date   • AORTIC VALVE REPAIR/REPLACEMENT N/A 12/16/2016    Procedure: BHARGAVI MINI STERNOTOMY AORTIC VALVE REPLACEMENT;  Surgeon: Robin Jones MD;  Location: [facility] MAIN OR;  Service:    • APPENDECTOMY  06/1972   • CAPSULE ENDOSCOPY  2015   • CARDIAC CATHETERIZATION N/A 8/25/2016    Procedure: Coronary angiography;  Surgeon: Lulú Hooper MD;  [facility];  Service:    • CARDIAC CATHETERIZATION N/A 8/25/2016    Procedure: Right Heart Cath;  Surgeon: Lulú Hooper MD;  Location:  [facility] CATH INVASIVE LOCATION;  Service:    • CATARACT EXTRACTION Bilateral     Left 12/10/13, Right 12/17/2013   • CATARACT EXTRACTION     • COLONOSCOPY  06/22/2015    Dr. Mohamud   • DENTAL PROCEDURE  2011    Implants   • ENDOSCOPY  06/22/2015    Dr. Mohamud   • ENTEROSCOPY SMALL BOWEL N/A 11/21/2016    Procedure: ENTEROSCOPY SMALL BOWEL WITH APC CAUTERY;  Surgeon: Tj Torres MD;  [facility] END[facility];  Service:    • PELVIC LAPAROSCOPY     • STERNOTOMY      mini   • TONSILLECTOMY     • TUBAL ABDOMINAL LIGATION         Current Outpatient Prescriptions on File Prior to Visit   Medication Sig Dispense Refill   • acetaminophen (TYLENOL) 325 MG tablet Take 2 tablets by mouth Every 4 (Four) Hours As Needed for mild pain (1-3).  0   • aspirin 81 MG EC tablet Take 1 tablet by mouth Daily.      • betamethasone valerate (VALISONE) 0.1 % cream Apply 1 application topically Daily As Needed.     • clarithromycin (BIAXIN) 500 MG tablet Take 1 tablet by mouth As Needed (take one tablet one hour prior to dental procedure). Prior to dental procedures 2 tablet 0   • Cyanocobalamin (VITAMIN B-12 PO) Take 1,000 mcg by mouth daily.     • ferrous sulfate 325 (65 FE) MG tablet Take 325 mg by mouth Daily With Breakfast. PT HOLDING FOR SURGERY     • losartan-hydrochlorothiazide (HYZAAR) 100-25 MG per tablet Take 1 tablet by mouth Daily. 90 tablet 3   • metoprolol tartrate (LOPRESSOR) 25 MG tablet TAKE 1 TABLET BY MOUTH TWICE DAILY 180 tablet 1   • Multiple Vitamins-Minerals (CENTRUM SILVER PO) Take 1 tablet by mouth Daily. PT HOLDING FOR SURGERY     • Pyridoxine HCl (VITAMIN B6 PO) Take 100 mg by mouth daily.     • SYNTHROID 88 MCG tablet TAKE 1 TABLET DAILY 90 tablet 3     No current facility-administered medications on file prior to visit.        Social History     Social History   • Marital status:      Spouse name: Kody   • Number of children: N/A   • Years of education: College     Occupational History   • Teacher, retired      [employer]     Social History Main Topics   • Smoking status: Former Smoker     Packs/day: 1.00     Years: 20.00     Types: Cigarettes   • Smokeless tobacco: Never Used      Comment: QUIT 2005   • Alcohol use Yes      Comment: 1 DRINK A WEEK   ///   caffeine use   • Drug use: No   • Sexual activity: Defer     Other Topics Concern   • Not on file     Social History Narrative           Review of Systems   Constitution: Negative.   HENT: Negative for congestion and headaches.    Eyes: Negative for vision loss in left eye and vision loss in right eye.   Respiratory: Negative.  Negative for cough, hemoptysis, shortness of breath, sleep disturbances due to breathing, snoring, sputum production and wheezing.    Endocrine: Negative.    Hematologic/Lymphatic: Negative.    Skin: Negative for poor  "wound healing and rash.   Musculoskeletal: Negative for falls, gout, muscle cramps and myalgias.   Gastrointestinal: Negative for abdominal pain, diarrhea, dysphagia, hematemesis, melena, nausea and vomiting.   Neurological: Negative for excessive daytime sleepiness, dizziness, light-headedness, loss of balance, seizures and vertigo.   Psychiatric/Behavioral: Negative for depression and substance abuse. The patient is not nervous/anxious.        Procedures    ECG 12 Lead  Date/Time: 8/2/2017 2:37 PM  Performed by: DYANA FIGUEROA  Authorized by: DYANA FIGUEROA   Comparison: compared with previous ECG   Similar to previous ECG  Rhythm: sinus rhythm  Clinical impression: normal ECG               Objective:      Vitals:    08/02/17 1422   BP: 140/60   BP Location: Left arm   Patient Position: Sitting   Pulse: 68   Weight: 149 lb 12.8 oz (67.9 kg)   Height: 62\" (157.5 cm)     Body mass index is 27.4 kg/(m^2).    Physical Exam   Constitutional: She is oriented to person, place, and time. She appears well-developed and well-nourished. No distress.   HENT:   Head: Normocephalic and atraumatic.   Eyes: Conjunctivae are normal. No scleral icterus.   Neck: Neck supple. No JVD present. Carotid bruit is not present. No thyromegaly present.   Cardiovascular: Normal rate, regular rhythm, S1 normal, S2 normal and intact distal pulses.   No extrasystoles are present. PMI is not displaced.  Exam reveals no gallop.    Murmur heard.   Medium-pitched systolic murmur is present with a grade of 3/6  at the upper left sternal border, back  Pulses:       Carotid pulses are 2+ on the right side, and 2+ on the left side with bruit.       Radial pulses are 2+ on the right side, and 2+ on the left side.        Dorsalis pedis pulses are 2+ on the right side, and 2+ on the left side.        Posterior tibial pulses are 2+ on the right side, and 2+ on the left side.   Pulmonary/Chest: Effort normal and breath sounds normal. No " respiratory distress. She has no wheezes. She has no rhonchi. She has no rales. She exhibits no tenderness.   Abdominal: Soft. Bowel sounds are normal. She exhibits no distension, no abdominal bruit and no mass. There is no tenderness.   Musculoskeletal: She exhibits no edema or deformity.   Lymphadenopathy:     She has no cervical adenopathy.   Neurological: She is alert and oriented to person, place, and time. No cranial nerve deficit.   Skin: Skin is warm and dry. No rash noted. She is not diaphoretic. No cyanosis. No pallor. Nails show no clubbing.   Psychiatric: She has a normal mood and affect. Judgment normal.   Vitals reviewed.      Lab Review:       Assessment:      Diagnosis Plan   1. S/P AVR     2. Essential hypertension     3. Hyperlipidemia, unspecified hyperlipidemia type     4. Iron deficiency anemia due to chronic blood loss       1. Severe aortic stenosis status post tissue-type aortic valve replacement. At this time she is doing well.   2. Atrial fibrillation after her aortic valve replacement.  Holter monitor 3/2017 shows no a fib and no a fib documented in cardiac rehab, off warfarin.   3. Pleural effusions which have resolved after surgery.   4. Mild coronary artery disease.   5. History of anemia. She follows with Dr. Fu    6. HTN, well controlled.  Plan:       See back in 6 months, no changes.

## 2017-08-24 RX ORDER — FERROUS SULFATE 325(65) MG
TABLET ORAL
Qty: 30 TABLET | Refills: 5 | Status: SHIPPED | OUTPATIENT
Start: 2017-08-24 | End: 2018-02-16 | Stop reason: SDUPTHER

## 2017-10-11 ENCOUNTER — OFFICE VISIT (OUTPATIENT)
Dept: INTERNAL MEDICINE | Facility: CLINIC | Age: 77
End: 2017-10-11

## 2017-10-11 VITALS
HEART RATE: 56 BPM | HEIGHT: 62 IN | DIASTOLIC BLOOD PRESSURE: 80 MMHG | OXYGEN SATURATION: 98 % | SYSTOLIC BLOOD PRESSURE: 118 MMHG | BODY MASS INDEX: 27.23 KG/M2 | WEIGHT: 148 LBS

## 2017-10-11 DIAGNOSIS — Z12.31 VISIT FOR SCREENING MAMMOGRAM: ICD-10-CM

## 2017-10-11 DIAGNOSIS — E03.9 HYPOTHYROIDISM, ADULT: ICD-10-CM

## 2017-10-11 DIAGNOSIS — I10 ESSENTIAL HYPERTENSION: Primary | ICD-10-CM

## 2017-10-11 DIAGNOSIS — E78.5 HYPERLIPIDEMIA, UNSPECIFIED HYPERLIPIDEMIA TYPE: ICD-10-CM

## 2017-10-11 DIAGNOSIS — R73.01 ELEVATED FASTING GLUCOSE: ICD-10-CM

## 2017-10-11 DIAGNOSIS — Z23 NEED FOR VACCINATION: ICD-10-CM

## 2017-10-11 DIAGNOSIS — M54.50 RIGHT-SIDED LOW BACK PAIN WITHOUT SCIATICA, UNSPECIFIED CHRONICITY: ICD-10-CM

## 2017-10-11 DIAGNOSIS — M81.0 SENILE OSTEOPOROSIS: ICD-10-CM

## 2017-10-11 LAB
ALBUMIN SERPL-MCNC: 4.3 G/DL (ref 3.5–5.2)
ALBUMIN/GLOB SERPL: 1.5 G/DL
ALP SERPL-CCNC: 62 U/L (ref 39–117)
ALT SERPL W P-5'-P-CCNC: 6 U/L (ref 1–33)
ANION GAP SERPL CALCULATED.3IONS-SCNC: 11.3 MMOL/L
AST SERPL-CCNC: 20 U/L (ref 1–32)
BILIRUB SERPL-MCNC: 0.7 MG/DL (ref 0.1–1.2)
BUN BLD-MCNC: 24 MG/DL (ref 8–23)
BUN/CREAT SERPL: 21.6 (ref 7–25)
CALCIUM SPEC-SCNC: 9.8 MG/DL (ref 8.6–10.5)
CHLORIDE SERPL-SCNC: 102 MMOL/L (ref 98–107)
CHOLEST SERPL-MCNC: 222 MG/DL (ref 0–200)
CO2 SERPL-SCNC: 29.7 MMOL/L (ref 22–29)
CREAT BLD-MCNC: 1.11 MG/DL (ref 0.57–1)
DEPRECATED RDW RBC AUTO: 55.2 FL (ref 37–54)
ERYTHROCYTE [DISTWIDTH] IN BLOOD BY AUTOMATED COUNT: 15.4 % (ref 11.5–15)
GFR SERPL CREATININE-BSD FRML MDRD: 48 ML/MIN/1.73
GLOBULIN UR ELPH-MCNC: 2.9 GM/DL
GLUCOSE BLD-MCNC: 106 MG/DL (ref 65–99)
HCT VFR BLD AUTO: 34.3 % (ref 34.1–44.9)
HDLC SERPL-MCNC: 71 MG/DL (ref 40–60)
HGB BLD-MCNC: 11.6 G/DL (ref 11.2–15.7)
LDLC SERPL CALC-MCNC: 131 MG/DL (ref 0–100)
LDLC/HDLC SERPL: 1.84 {RATIO}
MCH RBC QN AUTO: 34.1 PG (ref 26–34)
MCHC RBC AUTO-ENTMCNC: 33.8 G/DL (ref 31–37)
MCV RBC AUTO: 100.9 FL (ref 80–100)
PLATELET # BLD AUTO: 246 10*3/MM3 (ref 150–450)
PMV BLD AUTO: 10.3 FL (ref 6–12)
POTASSIUM BLD-SCNC: 4.1 MMOL/L (ref 3.5–5.2)
PROT SERPL-MCNC: 7.2 G/DL (ref 6–8.5)
RBC # BLD AUTO: 3.4 10*6/MM3 (ref 3.93–5.22)
SODIUM BLD-SCNC: 143 MMOL/L (ref 136–145)
TRIGL SERPL-MCNC: 101 MG/DL (ref 0–150)
VLDLC SERPL-MCNC: 20.2 MG/DL (ref 5–40)
WBC NRBC COR # BLD: 6.69 10*3/MM3 (ref 5–10)

## 2017-10-11 PROCEDURE — 80053 COMPREHEN METABOLIC PANEL: CPT | Performed by: INTERNAL MEDICINE

## 2017-10-11 PROCEDURE — 85027 COMPLETE CBC AUTOMATED: CPT | Performed by: INTERNAL MEDICINE

## 2017-10-11 PROCEDURE — 99214 OFFICE O/P EST MOD 30 MIN: CPT | Performed by: INTERNAL MEDICINE

## 2017-10-11 PROCEDURE — 80061 LIPID PANEL: CPT | Performed by: INTERNAL MEDICINE

## 2017-10-11 PROCEDURE — 36415 COLL VENOUS BLD VENIPUNCTURE: CPT | Performed by: INTERNAL MEDICINE

## 2017-10-11 NOTE — PROGRESS NOTES
Chief Complaint   Patient presents with   • Hyperlipidemia   • Hypertension   • Hypothyroidism        Subjective   Dory Hampton is a 77 y.o. female     HPI: Hypertension: This is a chronic problem.   No management changes were made at her last appointment.       She has not had problems with headaches, visual disturbance, dizziness.  Recent blood pressures have been controlled.    She has not had associated symptoms of chest pain, syncope, edema, orthopnea, MARTINEZ, PND.     Current treatment: ARB,diuretic, BB  Prudent diet and regular exercise have also been recommended. By report, there is good compliance and good tolerance of medication.     Hyperlipidemia:  This is a chronic problem.   No management changes were made at her last appointment.   Her most recent lipid panel was done on 1/24/2017.  Total cholesterol was 184, Triglycerides 99, HDL 58, .   Current treatment: dietary modification.       Hypothyroidism: Current treatment levothyroxine. She is compliant with the medication, tolerates it well and reports good control of symptoms of hypothyroidism.  She has not noted any change in tolerance of heat or cold.  No change in hair or skin.  No constipation.  No symptoms of hyperthyroidism.      Elevated fasting glucose:  She  has had elevated fasting glucose in the past.  She denies polyuria, polydipsia, vision change appetite change, unexplained weight loss.   Lab Results   Component Value Date    HGBA1C 4.30 (L) 12/15/2016      She continues to have right-sided low back pain.  She was doing physical therapy for this before she had heart surgery.  She would like to resume physical therapy.  The pain is aching.  Located in her right lower back.  It does not radiate.  She has not noticed any specific precipitating, relieving or aggravating factors.    She is due for screening mammogram and bone density.  [facility]          The following portions of the  "patient's history were reviewed and updated as appropriate: allergies, current medications, past social history, problem list, past surgical history    Review of Systems   Constitutional: Negative for activity change and appetite change.   HENT: Negative for nosebleeds.    Eyes: Negative for visual disturbance.   Respiratory: Negative for cough and shortness of breath.    Cardiovascular: Negative for chest pain, palpitations and leg swelling.   Neurological: Negative for headaches.   Psychiatric/Behavioral: Negative for sleep disturbance.           Objective     /80 (BP Location: Right arm, Patient Position: Sitting, Cuff Size: Adult)  Pulse 56  Ht 62\" (157.5 cm)  Wt 148 lb (67.1 kg)  SpO2 98%  BMI 27.07 kg/m2     Physical Exam   Constitutional: She is oriented to person, place, and time. She appears well-developed and well-nourished. No distress.   Neck: Normal carotid pulses present. Carotid bruit is not present.   Cardiovascular: Normal rate, regular rhythm, S1 normal, S2 normal and intact distal pulses.  Exam reveals no gallop and no friction rub.    Murmur heard.   Systolic murmur is present with a grade of 1/6   Pulses:       Carotid pulses are 2+ on the right side, and 2+ on the left side.  Pulmonary/Chest: Effort normal and breath sounds normal. No respiratory distress. She has no wheezes. She has no rhonchi. She has no rales. Chest wall is not dull to percussion.   Musculoskeletal: She exhibits no edema.   Neurological: She is alert and oriented to person, place, and time.   Skin: Skin is warm and dry.   Nursing note and vitals reviewed.      Assessment/Plan   Problem List Items Addressed This Visit        Cardiovascular and Mediastinum    Essential hypertension - Primary    Relevant Orders    Comprehensive Metabolic Panel (Completed)    Lipid Panel (Completed)    CBC (No Diff) (Completed)    Hyperlipidemia       Endocrine    Hypothyroidism, adult      Other Visit Diagnoses     Right-sided low " back pain without sciatica, unspecified chronicity        Relevant Orders    Ambulatory Referral to Physical Therapy Evaluate and treat    Need for vaccination        Relevant Orders    Pneumococcal Polysaccharide Vaccine 23-Valent Greater Than or Equal To 1yo Subcutaneous / IM    Elevated fasting glucose        Visit for screening mammogram        Relevant Orders    Mammo Screening Bilateral With CAD    Senile osteoporosis        bone density in 2013 showed oseoporosis    Relevant Orders    DEXA Bone Density Axial

## 2017-12-19 ENCOUNTER — APPOINTMENT (OUTPATIENT)
Dept: WOMENS IMAGING | Facility: HOSPITAL | Age: 77
End: 2017-12-19

## 2017-12-19 PROCEDURE — G0202 SCR MAMMO BI INCL CAD: HCPCS | Performed by: RADIOLOGY

## 2017-12-19 PROCEDURE — 77080 DXA BONE DENSITY AXIAL: CPT | Performed by: RADIOLOGY

## 2017-12-19 PROCEDURE — 77063 BREAST TOMOSYNTHESIS BI: CPT | Performed by: RADIOLOGY

## 2018-02-14 ENCOUNTER — OFFICE VISIT (OUTPATIENT)
Dept: INTERNAL MEDICINE | Facility: CLINIC | Age: 78
End: 2018-02-14

## 2018-02-14 VITALS
HEART RATE: 63 BPM | WEIGHT: 147 LBS | TEMPERATURE: 98.6 F | OXYGEN SATURATION: 98 % | HEIGHT: 62 IN | DIASTOLIC BLOOD PRESSURE: 60 MMHG | BODY MASS INDEX: 27.05 KG/M2 | SYSTOLIC BLOOD PRESSURE: 140 MMHG

## 2018-02-14 DIAGNOSIS — Z23 NEED FOR VACCINATION: ICD-10-CM

## 2018-02-14 DIAGNOSIS — E03.9 HYPOTHYROIDISM, ADULT: ICD-10-CM

## 2018-02-14 DIAGNOSIS — Z86.2: ICD-10-CM

## 2018-02-14 DIAGNOSIS — I10 ESSENTIAL HYPERTENSION: Primary | ICD-10-CM

## 2018-02-14 DIAGNOSIS — E78.5 HYPERLIPIDEMIA, UNSPECIFIED HYPERLIPIDEMIA TYPE: ICD-10-CM

## 2018-02-14 LAB
ALBUMIN SERPL-MCNC: 4.3 G/DL (ref 3.5–5.2)
ALBUMIN/GLOB SERPL: 1.6 G/DL
ALP SERPL-CCNC: 59 U/L (ref 39–117)
ALT SERPL-CCNC: 8 U/L (ref 1–33)
AST SERPL-CCNC: 23 U/L (ref 1–32)
BILIRUB SERPL-MCNC: 0.5 MG/DL (ref 0.1–1.2)
BUN SERPL-MCNC: 34 MG/DL (ref 8–23)
BUN/CREAT SERPL: 29.1 (ref 7–25)
CALCIUM SERPL-MCNC: 9.3 MG/DL (ref 8.6–10.5)
CHLORIDE SERPL-SCNC: 102 MMOL/L (ref 98–107)
CHOLEST SERPL-MCNC: 218 MG/DL (ref 0–200)
CO2 SERPL-SCNC: 30.9 MMOL/L (ref 22–29)
CREAT SERPL-MCNC: 1.17 MG/DL (ref 0.57–1)
ERYTHROCYTE [DISTWIDTH] IN BLOOD BY AUTOMATED COUNT: 16.1 % (ref 11.7–13)
GLOBULIN SER CALC-MCNC: 2.7 GM/DL
GLUCOSE SERPL-MCNC: 100 MG/DL (ref 65–99)
HCT VFR BLD AUTO: 35.5 % (ref 35.6–45.5)
HDLC SERPL-MCNC: 69 MG/DL (ref 40–60)
HGB BLD-MCNC: 11.5 G/DL (ref 11.9–15.5)
IRON SERPL-MCNC: 109 MCG/DL (ref 37–145)
LDLC SERPL CALC-MCNC: 129 MG/DL (ref 0–100)
MCH RBC QN AUTO: 32.8 PG (ref 26.9–32)
MCHC RBC AUTO-ENTMCNC: 32.4 G/DL (ref 32.4–36.3)
MCV RBC AUTO: 101.1 FL (ref 80.5–98.2)
PLATELET # BLD AUTO: 237 10*3/MM3 (ref 140–500)
POTASSIUM SERPL-SCNC: 4.4 MMOL/L (ref 3.5–5.2)
PROT SERPL-MCNC: 7 G/DL (ref 6–8.5)
RBC # BLD AUTO: 3.51 10*6/MM3 (ref 3.9–5.2)
SODIUM SERPL-SCNC: 144 MMOL/L (ref 136–145)
TRIGL SERPL-MCNC: 99 MG/DL (ref 0–150)
TSH SERPL-ACNC: 0.39 MIU/ML (ref 0.27–4.2)
VLDLC SERPL-MCNC: 19.8 MG/DL (ref 5–40)
WBC # BLD AUTO: 5.99 10*3/MM3 (ref 4.5–10.7)

## 2018-02-14 PROCEDURE — G0009 ADMIN PNEUMOCOCCAL VACCINE: HCPCS | Performed by: INTERNAL MEDICINE

## 2018-02-14 PROCEDURE — 99214 OFFICE O/P EST MOD 30 MIN: CPT | Performed by: INTERNAL MEDICINE

## 2018-02-14 PROCEDURE — 90732 PPSV23 VACC 2 YRS+ SUBQ/IM: CPT | Performed by: INTERNAL MEDICINE

## 2018-02-14 RX ORDER — CLARITHROMYCIN 500 MG/1
500 TABLET, COATED ORAL AS NEEDED
Qty: 2 TABLET | Refills: 0 | Status: SHIPPED | OUTPATIENT
Start: 2018-02-14 | End: 2018-07-30 | Stop reason: SDUPTHER

## 2018-02-14 NOTE — PROGRESS NOTES
Chief Complaint   Patient presents with   • Hyperlipidemia   • Hypertension   • Hypothyroidism        Subjective   Dory Hampton is a 77 y.o. female     Hypertension   This is a chronic problem. The problem is controlled. Pertinent negatives include no chest pain, headaches, orthopnea, palpitations, peripheral edema, PND or shortness of breath. There are no associated agents to hypertension. Past treatments include angiotensin blockers, diuretics and beta blockers (This is current treatment). The current treatment provides moderate improvement. There are no compliance problems.    Hyperlipidemia   This is a chronic problem. Recent lipid tests were reviewed and are variable. Factors aggravating her hyperlipidemia include beta blockers and thiazides. Pertinent negatives include no chest pain or shortness of breath. Current antihyperlipidemic treatment includes diet change. The current treatment provides moderate improvement of lipids. There are no compliance problems.         Hypothyroidism: Current treatment levothyroxine. She is compliant with the medication, tolerates it well and reports good control of symptoms of hypothyroidism.  She has not noted any change in tolerance of heat or cold.  No change in hair or skin.  No constipation.  No symptoms of hyperthyroidism.         H/O aortic stenosis, AVM's, anemia related to GI blood loss and also mechanical hemolysis.  She continues to take iron supplement.    The following portions of the patient's history were reviewed and updated as appropriate: allergies, current medications, past social history, problem list, past surgical history    Review of Systems   Constitutional: Negative for activity change and appetite change.   HENT: Negative for nosebleeds.    Eyes: Negative for visual disturbance.   Respiratory: Negative for cough and shortness of breath.    Cardiovascular: Negative for chest pain, palpitations, orthopnea, leg swelling and PND.   Gastrointestinal:  "Positive for constipation.   Neurological: Negative for headaches.   Psychiatric/Behavioral: Negative for sleep disturbance.           Objective     /60  Pulse 63  Temp 98.6 °F (37 °C)  Ht 157.5 cm (62.01\")  Wt 66.7 kg (147 lb)  SpO2 98%  BMI 26.88 kg/m2     Physical Exam   Constitutional: She is oriented to person, place, and time. She appears well-developed and well-nourished. No distress.   Neck: Normal carotid pulses present. Carotid bruit is not present.   Cardiovascular: Normal rate, regular rhythm, S1 normal, S2 normal and intact distal pulses.  Exam reveals no gallop and no friction rub.    Murmur heard.  Pulses:       Carotid pulses are 2+ on the right side, and 2+ on the left side.  Pulmonary/Chest: Effort normal and breath sounds normal. No respiratory distress. She has no wheezes. She has no rhonchi. She has no rales. Chest wall is not dull to percussion.   Musculoskeletal: She exhibits no edema.   Neurological: She is alert and oriented to person, place, and time.   Skin: Skin is warm and dry.   Nursing note and vitals reviewed.      Assessment/Plan   Problem List Items Addressed This Visit        Cardiovascular and Mediastinum    Essential hypertension - Primary    Relevant Orders    Comprehensive Metabolic Panel    Lipid Panel    Hyperlipidemia       Endocrine    Hypothyroidism, adult    Relevant Orders    TSH Rfx On Abnormal To Free T4      Other Visit Diagnoses     Need for vaccination        Relevant Orders    Pneumococcal Polysaccharide Vaccine 23-Valent Greater Than or Equal To 1yo Subcutaneous / IM (Completed)    H/O hemolytic anemia        Relevant Orders    CBC (No Diff)    Iron            "

## 2018-02-16 RX ORDER — FERROUS SULFATE 325(65) MG
TABLET ORAL
Qty: 30 TABLET | Refills: 0 | Status: SHIPPED | OUTPATIENT
Start: 2018-02-16 | End: 2018-03-17 | Stop reason: SDUPTHER

## 2018-02-28 ENCOUNTER — OFFICE VISIT (OUTPATIENT)
Dept: CARDIOLOGY | Facility: CLINIC | Age: 78
End: 2018-02-28

## 2018-02-28 VITALS
SYSTOLIC BLOOD PRESSURE: 122 MMHG | HEART RATE: 70 BPM | WEIGHT: 147.4 LBS | DIASTOLIC BLOOD PRESSURE: 58 MMHG | BODY MASS INDEX: 27.12 KG/M2 | HEIGHT: 62 IN

## 2018-02-28 DIAGNOSIS — Z95.2 S/P AVR: Primary | ICD-10-CM

## 2018-02-28 DIAGNOSIS — I10 ESSENTIAL HYPERTENSION: ICD-10-CM

## 2018-02-28 DIAGNOSIS — E78.5 HYPERLIPIDEMIA, UNSPECIFIED HYPERLIPIDEMIA TYPE: ICD-10-CM

## 2018-02-28 PROCEDURE — 99214 OFFICE O/P EST MOD 30 MIN: CPT | Performed by: INTERNAL MEDICINE

## 2018-02-28 PROCEDURE — 93000 ELECTROCARDIOGRAM COMPLETE: CPT | Performed by: INTERNAL MEDICINE

## 2018-02-28 RX ORDER — LOSARTAN POTASSIUM 100 MG/1
100 TABLET ORAL DAILY
Qty: 90 TABLET | Refills: 3 | Status: SHIPPED | OUTPATIENT
Start: 2018-02-28 | End: 2019-01-23 | Stop reason: SDUPTHER

## 2018-02-28 NOTE — PROGRESS NOTES
Date of Office Visit: 2018  Encounter Provider: Zaria Bryan MD  Place of Service: [facility] CARDIOLOGY  Patient Name: Dory Hampton  :1940      Patient ID:  Dory Hampton is a 77 y.o. female is here for  followup for s/p AVR.         History of Present Illness    She had an echocardiogram which was performed on 2016 for a murmur. Her ejection fraction was 68% with moderate concentric left ventricular hypertrophy, normal segmental wall motion, grade II diastolic dysfunction, moderate aortic insufficiency and severe aortic stenosis. Her mean gradient was 64 mmHg with a maximum pressure gradient of 102. Her aortic valve area was calculated at 0.51. Her maximum velocity was 4 meters per second.       She has had anemia. In 2015 she presented to the emergency department at [facility] with a syncopal episode and was found to have a hemoglobin of 5. She had an extensive gastrointestinal evaluation including upper and lower endoscopy, and she did the capsule camera, and no gastrointestinal bleeding site was found. She then was transfused at that point. Then in 2016 she required a transfusion again. She was referred to Dr. Natalia Fu from [facility] hematology/oncology. Dr. Fu saw the patient and performed another transfusion on her in 2016 for a hemoglobin on 2016 that was 8.4. She said that when she was transfused in 2015 she felt good and her breathing improved, and she was not as fatigued. When she received her transfusion in 2016 she noticed no improvement in her breathing and she was still very fatigued.      She is treated for hypertension, hyperlipidemia and hypothyroidism and these have been well controlled. She quit smoking in . Her maternal grandmother had heart failure and myocardial infarctions.       The patient is  and lives with her  who has post herpetic neuralgia but otherwise is healthy. She has  three healthy children.       I evaluated her in the office and I felt she was suffering from severe symptomatic aortic stenosis, and I recommended she be worked up for this. She underwent a cardiac catheterization on 08/25/2016 and this revealed 10% mid left main stenosis, normal left circumflex, 50% mid left anterior descending artery stenosis, 30% mid RCA stenosis and severe aortic valvular stenosis. At that time she was given a referral to cardiothoracic surgery. They ordered bilateral carotid duplex studies which were obtained on 12/15/2016 and revealed mild bilateral internal carotid artery stenosis. On 12/16/2016 she underwent surgery and received a mini-sternotomy and had a 21 mm Magna pericardial prosthetic aortic valve replacement. She was in the hospital from 12/15/2016 to 12/23/2016. During that time she did have postoperative atrial fibrillation with rapid ventricular response and bilateral pleural effusions. She also did have some anemia requiring a transfusion prior to her surgery. At one point, there was talk about doing a right thoracentesis but then her pleural effusion resolved with diuretic therapy. Coumadin was administered because of atrial fibrillation postoperatively.       I stopped her amiodarone at her visit in early 03/2017.  She had a Holter monitor done after this to make sure that she was not having any atrial fibrillation and this was normal.  her warfarin was discontinued.   She has had no documented atrial fibrillation since surgery.    She is doing well.  She has skin rash and will be seeing Dr. Heath. She has no chest pain, dyspnea, dizziness.  She has no syncope.      Past Medical History:   Diagnosis Date   • Abnormal electrocardiogram    • Anemia    • Aortic stenosis     severe   • Asthma     HISTORY   • Bradycardia    • Chest pain    • Coronary artery disease     heart murmer   • Diastolic dysfunction     grade II   • Environmental allergies    • Essential hypertension    •  Fatigue    • Female stress incontinence    • History of diverticulosis    • History of transfusion    • Hyperlipidemia    • Hypertension    • Hypokalemia    • Hypothyroid    • Iron deficiency anemia     hemolytic anemia   • Left ventricular hypertrophy    • Murmur    • Nonrheumatic aortic (valve) stenosis    • PAF (paroxysmal atrial fibrillation)     with RVR   • Pleural effusion     bilateral, post op   • Psoriasis    • Tendinitis    • Thoracic aorta atherosclerosis          Past Surgical History:   Procedure Laterality Date   • AORTIC VALVE REPAIR/REPLACEMENT N/A 12/16/2016    Procedure: BHARGAVI MINI STERNOTOMY AORTIC VALVE REPLACEMENT;  Surgeon: Robin Jones MD;  Location: [facility] MAIN OR;  Service:    • APPENDECTOMY  06/1972   • CAPSULE ENDOSCOPY  2015   • CARDIAC CATHETERIZATION N/A 8/25/2016    Procedure: Coronary angiography;  Surgeon: Lulú Hooper MD;  [facility];  Service:    • CARDIAC CATHETERIZATION N/A 8/25/2016    Procedure: Right Heart Cath;  Surgeon: Lulú Hooper MD;  Location:  [facility] CATH INVASIVE LOCATION;  Service:    • CATARACT EXTRACTION Bilateral     Left 12/10/13, Right 12/17/2013   • CATARACT EXTRACTION     • COLONOSCOPY  06/22/2015    Dr. Mohamud   • DENTAL PROCEDURE  2011    Implants   • ENDOSCOPY  06/22/2015    Dr. Mohamud   • ENTEROSCOPY SMALL BOWEL N/A 11/21/2016    Procedure: ENTEROSCOPY SMALL BOWEL WITH APC CAUTERY;  Surgeon: Tj Torres MD;  [facility];  Service:    • PELVIC LAPAROSCOPY     • STERNOTOMY      mini   • TONSILLECTOMY     • TUBAL ABDOMINAL LIGATION         Current Outpatient Prescriptions on File Prior to Visit   Medication Sig Dispense Refill   • aspirin 81 MG EC tablet Take 1 tablet by mouth Daily.     • betamethasone valerate (VALISONE) 0.1 % cream Apply 1 application topically Daily As Needed.     • clarithromycin (BIAXIN) 500 MG tablet Take 1 tablet by mouth As Needed (take one tablet one hour prior to dental  procedure). Prior to dental procedures 2 tablet 0   • Cyanocobalamin (VITAMIN B-12 PO) Take 1,000 mcg by mouth daily.     • ferrous sulfate 325 (65 FE) MG tablet TAKE 1 TABLET BY MOUTH DAILY 30 tablet 0   • losartan-hydrochlorothiazide (HYZAAR) 100-25 MG per tablet Take 1 tablet by mouth Daily. 90 tablet 3   • metoprolol tartrate (LOPRESSOR) 25 MG tablet TAKE 1 TABLET BY MOUTH TWICE DAILY 180 tablet 0   • Multiple Vitamins-Minerals (CENTRUM SILVER PO) Take 1 tablet by mouth Daily. PT HOLDING FOR SURGERY     • Pyridoxine HCl (VITAMIN B6 PO) Take 100 mg by mouth daily.     • SYNTHROID 88 MCG tablet TAKE 1 TABLET DAILY 90 tablet 3     No current facility-administered medications on file prior to visit.        Social History     Social History   • Marital status:      Spouse name: Kody   • Number of children: N/A   • Years of education: College     Occupational History   • Teacher, retired      [employer]     Social History Main Topics   • Smoking status: Former Smoker     Packs/day: 1.00     Years: 20.00     Types: Cigarettes   • Smokeless tobacco: Never Used      Comment: QUIT 2005   • Alcohol use Yes      Comment: 1 DRINK A WEEK   ///   caffeine use   • Drug use: No   • Sexual activity: Defer     Other Topics Concern   • Not on file     Social History Narrative           Review of Systems   Constitution: Negative.   HENT: Negative for congestion.    Eyes: Negative for vision loss in left eye and vision loss in right eye.   Respiratory: Negative.  Negative for cough, hemoptysis, shortness of breath, sleep disturbances due to breathing, snoring, sputum production and wheezing.    Endocrine: Negative.    Hematologic/Lymphatic: Negative.    Skin: Negative for poor wound healing and rash.   Musculoskeletal: Negative for falls, gout, muscle cramps and myalgias.   Gastrointestinal: Negative for abdominal pain, diarrhea, dysphagia, hematemesis, melena, nausea and vomiting.   Neurological: Negative for excessive daytime  "sleepiness, dizziness, headaches, light-headedness, loss of balance, seizures and vertigo.   Psychiatric/Behavioral: Negative for depression and substance abuse. The patient is not nervous/anxious.        Procedures    ECG 12 Lead  Date/Time: 2/28/2018 1:40 PM  Performed by: DYANA FIGUEROA  Authorized by: DYANA FIGUEROA   Comparison: compared with previous ECG   Similar to previous ECG  Rhythm: sinus rhythm  Clinical impression: normal ECG               Objective:      Vitals:    02/28/18 1325   BP: 122/58   BP Location: Left arm   Patient Position: Sitting   Pulse: 70   Weight: 66.9 kg (147 lb 6.4 oz)   Height: 157.5 cm (62\")     Body mass index is 26.96 kg/(m^2).    Physical Exam   Constitutional: She is oriented to person, place, and time. She appears well-developed and well-nourished. No distress.   HENT:   Head: Normocephalic and atraumatic.   Eyes: Conjunctivae are normal. No scleral icterus.   Neck: Neck supple. No JVD present. Carotid bruit is not present. No thyromegaly present.   Cardiovascular: Normal rate, regular rhythm, S1 normal, S2 normal and intact distal pulses.   No extrasystoles are present. PMI is not displaced.  Exam reveals no gallop.    Murmur heard.   Midsystolic murmur is present with a grade of 2/6  at the upper right sternal border, upper left sternal border  Pulses:       Carotid pulses are 2+ on the right side, and 2+ on the left side.       Radial pulses are 2+ on the right side, and 2+ on the left side.        Dorsalis pedis pulses are 2+ on the right side, and 2+ on the left side.        Posterior tibial pulses are 2+ on the right side, and 2+ on the left side.   Pulmonary/Chest: Effort normal and breath sounds normal. No respiratory distress. She has no wheezes. She has no rhonchi. She has no rales. She exhibits no tenderness.   Abdominal: Soft. Bowel sounds are normal. She exhibits no distension, no abdominal bruit and no mass. There is no tenderness.   Musculoskeletal: " She exhibits no edema or deformity.   Lymphadenopathy:     She has no cervical adenopathy.   Neurological: She is alert and oriented to person, place, and time. No cranial nerve deficit.   Skin: Skin is warm and dry. No rash noted. She is not diaphoretic. No cyanosis. No pallor. Nails show no clubbing.   Psychiatric: She has a normal mood and affect. Judgment normal.   Vitals reviewed.      Lab Review:       Assessment:      Diagnosis Plan   1. S/P AVR     2. Hyperlipidemia, unspecified hyperlipidemia type     3. Essential hypertension       1. Severe aortic stenosis status post tissue-type aortic valve replacement. At this time she is doing well.   2. Atrial fibrillation after her aortic valve replacement.  Holter monitor 3/2017 shows no a fib and no a fib documented in cardiac rehab, off warfarin.   3. Pleural effusions which have resolved after surgery.   4. Mild coronary artery disease.   5. History of anemia. She follows with Dr. Fu    6. HTN, well controlled.     Plan:       See morgan in 1 year.  Stop hyzaar as hctz may be causing dry skin.  Start cozaar 100mg daily.

## 2018-03-19 RX ORDER — FERROUS SULFATE 325(65) MG
TABLET ORAL
Qty: 30 TABLET | Refills: 3 | Status: SHIPPED | OUTPATIENT
Start: 2018-03-19 | End: 2018-10-17 | Stop reason: SDUPTHER

## 2018-04-18 ENCOUNTER — TELEPHONE (OUTPATIENT)
Dept: INTERNAL MEDICINE | Facility: CLINIC | Age: 78
End: 2018-04-18

## 2018-04-18 NOTE — TELEPHONE ENCOUNTER
----- Message from Victoria Blunt sent at 4/18/2018  9:00 AM EDT -----  Contact: Dr Billy' pt - RE: lab orders  Could you please send Dr. Billy a message and ask if this patient needs any repeat labs?  I told her we would ask and would let her know if she needed to come in.     Thanks, Noble     Could you please put lab orders in chart if needed? Please advise. Thanks

## 2018-04-30 RX ORDER — LEVOTHYROXINE SODIUM 88 MCG
TABLET ORAL
Qty: 90 TABLET | Refills: 3 | Status: SHIPPED | OUTPATIENT
Start: 2018-04-30 | End: 2019-04-30 | Stop reason: SDUPTHER

## 2018-06-25 ENCOUNTER — OFFICE VISIT (OUTPATIENT)
Dept: INTERNAL MEDICINE | Facility: CLINIC | Age: 78
End: 2018-06-25

## 2018-06-25 VITALS
WEIGHT: 147 LBS | BODY MASS INDEX: 27.05 KG/M2 | HEIGHT: 62 IN | DIASTOLIC BLOOD PRESSURE: 68 MMHG | SYSTOLIC BLOOD PRESSURE: 144 MMHG

## 2018-06-25 DIAGNOSIS — D50.0 IRON DEFICIENCY ANEMIA DUE TO CHRONIC BLOOD LOSS: ICD-10-CM

## 2018-06-25 DIAGNOSIS — E03.9 HYPOTHYROIDISM, ADULT: ICD-10-CM

## 2018-06-25 DIAGNOSIS — E78.5 HYPERLIPIDEMIA, UNSPECIFIED HYPERLIPIDEMIA TYPE: Primary | ICD-10-CM

## 2018-06-25 DIAGNOSIS — I10 ESSENTIAL HYPERTENSION: ICD-10-CM

## 2018-06-25 LAB
ALBUMIN SERPL-MCNC: 4.4 G/DL (ref 3.5–5.2)
ALBUMIN/GLOB SERPL: 1.8 G/DL
ALP SERPL-CCNC: 75 U/L (ref 39–117)
ALT SERPL-CCNC: 8 U/L (ref 1–33)
AST SERPL-CCNC: 19 U/L (ref 1–32)
BILIRUB SERPL-MCNC: 0.8 MG/DL (ref 0.1–1.2)
BUN SERPL-MCNC: 20 MG/DL (ref 8–23)
BUN/CREAT SERPL: 20 (ref 7–25)
CALCIUM SERPL-MCNC: 9.6 MG/DL (ref 8.6–10.5)
CHLORIDE SERPL-SCNC: 103 MMOL/L (ref 98–107)
CHOLEST SERPL-MCNC: 214 MG/DL (ref 0–200)
CO2 SERPL-SCNC: 28.4 MMOL/L (ref 22–29)
CREAT SERPL-MCNC: 1 MG/DL (ref 0.57–1)
DEPRECATED RDW RBC AUTO: 55.7 FL (ref 37–54)
ERYTHROCYTE [DISTWIDTH] IN BLOOD BY AUTOMATED COUNT: 15.6 % (ref 11.5–15)
GLOBULIN SER CALC-MCNC: 2.4 GM/DL
GLUCOSE SERPL-MCNC: 92 MG/DL (ref 65–99)
HCT VFR BLD AUTO: 36.7 % (ref 34.1–44.9)
HDLC SERPL-MCNC: 71 MG/DL (ref 40–60)
HGB BLD-MCNC: 12.3 G/DL (ref 11.2–15.7)
LDLC SERPL CALC-MCNC: 123 MG/DL (ref 0–100)
MCH RBC QN AUTO: 33.6 PG (ref 26–34)
MCHC RBC AUTO-ENTMCNC: 33.5 G/DL (ref 31–37)
MCV RBC AUTO: 100.3 FL (ref 80–100)
PLATELET # BLD AUTO: 208 10*3/MM3 (ref 150–450)
PMV BLD AUTO: 10.6 FL (ref 6–12)
POTASSIUM SERPL-SCNC: 4.2 MMOL/L (ref 3.5–5.2)
PROT SERPL-MCNC: 6.8 G/DL (ref 6–8.5)
RBC # BLD AUTO: 3.66 10*6/MM3 (ref 3.93–5.22)
SODIUM SERPL-SCNC: 144 MMOL/L (ref 136–145)
TRIGL SERPL-MCNC: 101 MG/DL (ref 0–150)
VLDLC SERPL-MCNC: 20.2 MG/DL (ref 5–40)
WBC NRBC COR # BLD: 6.61 10*3/MM3 (ref 5–10)

## 2018-06-25 PROCEDURE — 36415 COLL VENOUS BLD VENIPUNCTURE: CPT | Performed by: INTERNAL MEDICINE

## 2018-06-25 PROCEDURE — 85027 COMPLETE CBC AUTOMATED: CPT | Performed by: INTERNAL MEDICINE

## 2018-06-25 PROCEDURE — 99214 OFFICE O/P EST MOD 30 MIN: CPT | Performed by: INTERNAL MEDICINE

## 2018-06-25 RX ORDER — METOPROLOL TARTRATE 50 MG/1
50 TABLET, FILM COATED ORAL 2 TIMES DAILY
Qty: 180 TABLET | Refills: 1 | Status: SHIPPED | OUTPATIENT
Start: 2018-06-25 | End: 2019-02-11 | Stop reason: SDUPTHER

## 2018-06-25 NOTE — PROGRESS NOTES
Chief Complaint   Patient presents with   • Hypertension     4 month follow up   • Hyperlipidemia   • Hypothyroidism       Subjective   Dory Hampton is a 77 y.o. female.     Hypertension   This is a chronic problem. The problem is controlled. Associated symptoms include shortness of breath (sometimes going up stairs.). Pertinent negatives include no blurred vision, chest pain, orthopnea, palpitations, peripheral edema or PND. There are no associated agents to hypertension. Past treatments include diuretics (She previously took hydrochlorothiazide.  She found her mouth was always dry and so that has been discontinued.). Current antihypertension treatment includes angiotensin blockers, beta blockers and lifestyle changes. The current treatment provides moderate improvement. There are no compliance problems.  There is no history of CAD/MI or CVA.   Hyperlipidemia   This is a chronic problem. The problem is controlled. Recent lipid tests were reviewed and are high. Exacerbating diseases include hypothyroidism. Associated symptoms include shortness of breath (sometimes going up stairs.). Pertinent negatives include no chest pain. Current antihyperlipidemic treatment includes diet change and exercise. The current treatment provides moderate improvement of lipids. There are no compliance problems.    Hypothyroidism   Associated symptoms include fatigue. Pertinent negatives include no chest pain or coughing.        Hypothyroidism: Current treatment levothyroxine. She is compliant with the medication, tolerates it well and reports good control of symptoms of hypothyroidism.  She has not noted any change in tolerance of heat or cold.  No change in hair or skin.  No constipation.  No symptoms of hyperthyroidism.   Recent TSH was within normal range.    History of anemia related to angiodysplasia of small intestine, status post coagulation.    The following portions of the patient's history were reviewed and updated as  "appropriate: allergies, current medications, past family history, past medical history, past social history, past surgical history and problem list.    Review of Systems   Constitutional: Positive for fatigue. Negative for appetite change.   HENT: Negative for nosebleeds.    Eyes: Negative for blurred vision and double vision.   Respiratory: Positive for shortness of breath (sometimes going up stairs.). Negative for cough.    Cardiovascular: Negative for chest pain, palpitations, orthopnea, leg swelling and PND.   Neurological: Negative for headache.         Current Outpatient Prescriptions:   •  aspirin 81 MG EC tablet, Take 1 tablet by mouth Daily., Disp: , Rfl:   •  betamethasone valerate (VALISONE) 0.1 % cream, Apply 1 application topically Daily As Needed., Disp: , Rfl:   •  clarithromycin (BIAXIN) 500 MG tablet, Take 1 tablet by mouth As Needed (take one tablet one hour prior to dental procedure). Prior to dental procedures, Disp: 2 tablet, Rfl: 0  •  Cyanocobalamin (VITAMIN B-12 PO), Take 1,000 mcg by mouth daily., Disp: , Rfl:   •  ferrous sulfate 325 (65 FE) MG tablet, TAKE 1 TABLET BY MOUTH DAILY, Disp: 30 tablet, Rfl: 3  •  losartan (COZAAR) 100 MG tablet, Take 1 tablet by mouth Daily., Disp: 90 tablet, Rfl: 3  •  metoprolol tartrate (LOPRESSOR) 50 MG tablet, Take 1 tablet by mouth 2 (Two) Times a Day., Disp: 180 tablet, Rfl: 1  •  Multiple Vitamins-Minerals (CENTRUM SILVER PO), Take 1 tablet by mouth Daily. PT HOLDING FOR SURGERY, Disp: , Rfl:   •  Pyridoxine HCl (VITAMIN B6 PO), Take 100 mg by mouth daily., Disp: , Rfl:   •  SYNTHROID 88 MCG tablet, TAKE 1 TABLET DAILY, Disp: 90 tablet, Rfl: 3        Objective     /68   Ht 157.5 cm (62\")   Wt 66.7 kg (147 lb)   BMI 26.89 kg/m²     Physical Exam   Constitutional: She is oriented to person, place, and time. She appears well-developed and well-nourished. No distress.   Neck: Normal carotid pulses present. Carotid bruit is not present. "   Cardiovascular: Regular rhythm, S1 normal and S2 normal.  Exam reveals no gallop and no friction rub.    Murmur heard.  Pulses:       Carotid pulses are 2+ on the right side, and 2+ on the left side.  Pulmonary/Chest: Effort normal and breath sounds normal. She has no wheezes. She has no rhonchi. She has no rales. Chest wall is not dull to percussion.   Musculoskeletal: She exhibits no edema.   Neurological: She is alert and oriented to person, place, and time.   Skin: Skin is warm and dry.   Nursing note and vitals reviewed.        Assessment/Plan   Dory was seen today for hypertension, hyperlipidemia and hypothyroidism.    Diagnoses and all orders for this visit:    Hyperlipidemia, unspecified hyperlipidemia type  -     Lipid Panel; Future  -     Lipid Panel    Essential hypertension  -     Comprehensive Metabolic Panel; Future  -     Comprehensive Metabolic Panel    Iron deficiency anemia due to chronic blood loss  -     CBC (No Diff); Future  -     CBC (No Diff)    Hypothyroidism, adult    Other orders  -     metoprolol tartrate (LOPRESSOR) 50 MG tablet; Take 1 tablet by mouth 2 (Two) Times a Day.      Blood pressure is not optimally controlled.  Metoprolol increased from 25 mg twice a day to 50 mg twice a day.

## 2018-07-23 ENCOUNTER — TELEPHONE (OUTPATIENT)
Dept: INTERNAL MEDICINE | Facility: CLINIC | Age: 78
End: 2018-07-23

## 2018-07-23 NOTE — TELEPHONE ENCOUNTER
----- Message from Shantel Watson sent at 7/20/2018  3:43 PM EDT -----  Contact: Patient   Patient called regarding 2 issues:      1)  Her insurance requires her to have order for Hep A.  She would like that sent to her pharmacy please.     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  She would like 2 clarithromycin 500-mg tablets called in.  Dental work in next couple of weeks.  Please advise.    Patient:  [phone] mg     [email or web address] [address] [place] [address]  AT [facility] [address] [facility] [address] - [phone]  - [phone] FX

## 2018-07-30 DIAGNOSIS — Z29.8 NEED FOR SBE (SUBACUTE BACTERIAL ENDOCARDITIS) PROPHYLAXIS: Primary | ICD-10-CM

## 2018-07-30 RX ORDER — CLARITHROMYCIN 500 MG/1
500 TABLET, COATED ORAL AS NEEDED
Qty: 2 TABLET | Refills: 0 | Status: SHIPPED | OUTPATIENT
Start: 2018-07-30 | End: 2018-11-09

## 2018-08-01 RX ORDER — CLARITHROMYCIN 500 MG/1
TABLET, COATED ORAL
Qty: 2 TABLET | Refills: 0 | Status: SHIPPED | OUTPATIENT
Start: 2018-08-01 | End: 2018-11-09

## 2018-10-17 RX ORDER — FERROUS SULFATE 325(65) MG
TABLET ORAL
Qty: 30 TABLET | Refills: 0 | Status: SHIPPED | OUTPATIENT
Start: 2018-10-17 | End: 2018-11-15 | Stop reason: SDUPTHER

## 2018-11-09 ENCOUNTER — OFFICE VISIT (OUTPATIENT)
Dept: INTERNAL MEDICINE | Facility: CLINIC | Age: 78
End: 2018-11-09

## 2018-11-09 VITALS
HEIGHT: 61 IN | BODY MASS INDEX: 27.38 KG/M2 | OXYGEN SATURATION: 95 % | HEART RATE: 57 BPM | RESPIRATION RATE: 14 BRPM | DIASTOLIC BLOOD PRESSURE: 76 MMHG | SYSTOLIC BLOOD PRESSURE: 144 MMHG | TEMPERATURE: 96.8 F | WEIGHT: 145 LBS

## 2018-11-09 DIAGNOSIS — E03.9 HYPOTHYROIDISM, ADULT: ICD-10-CM

## 2018-11-09 DIAGNOSIS — I10 ESSENTIAL HYPERTENSION: ICD-10-CM

## 2018-11-09 DIAGNOSIS — Z00.00 INITIAL MEDICARE ANNUAL WELLNESS VISIT: Primary | ICD-10-CM

## 2018-11-09 DIAGNOSIS — E78.5 HYPERLIPIDEMIA, UNSPECIFIED HYPERLIPIDEMIA TYPE: ICD-10-CM

## 2018-11-09 DIAGNOSIS — D50.0 IRON DEFICIENCY ANEMIA DUE TO CHRONIC BLOOD LOSS: ICD-10-CM

## 2018-11-09 LAB
ALBUMIN SERPL-MCNC: 4.5 G/DL (ref 3.5–5.2)
ALBUMIN/GLOB SERPL: 1.8 G/DL
ALP SERPL-CCNC: 71 U/L (ref 39–117)
ALT SERPL-CCNC: 6 U/L (ref 1–33)
AST SERPL-CCNC: 17 U/L (ref 1–32)
BILIRUB SERPL-MCNC: 0.7 MG/DL (ref 0.1–1.2)
BUN SERPL-MCNC: 20 MG/DL (ref 8–23)
BUN/CREAT SERPL: 19 (ref 7–25)
CALCIUM SERPL-MCNC: 9.6 MG/DL (ref 8.6–10.5)
CHLORIDE SERPL-SCNC: 105 MMOL/L (ref 98–107)
CHOLEST SERPL-MCNC: 205 MG/DL (ref 0–200)
CO2 SERPL-SCNC: 28.3 MMOL/L (ref 22–29)
CREAT SERPL-MCNC: 1.05 MG/DL (ref 0.57–1)
ERYTHROCYTE [DISTWIDTH] IN BLOOD BY AUTOMATED COUNT: 16.1 % (ref 11.7–13)
GLOBULIN SER CALC-MCNC: 2.5 GM/DL
GLUCOSE SERPL-MCNC: 108 MG/DL (ref 65–99)
HCT VFR BLD AUTO: 37.4 % (ref 35.6–45.5)
HDLC SERPL-MCNC: 82 MG/DL (ref 40–60)
HGB BLD-MCNC: 12.5 G/DL (ref 11.9–15.5)
IRON SERPL-MCNC: 139 MCG/DL (ref 37–145)
LDLC SERPL CALC-MCNC: 110 MG/DL (ref 0–100)
MCH RBC QN AUTO: 33.5 PG (ref 26.9–32)
MCHC RBC AUTO-ENTMCNC: 33.4 G/DL (ref 32.4–36.3)
MCV RBC AUTO: 100.3 FL (ref 80.5–98.2)
PLATELET # BLD AUTO: 245 10*3/MM3 (ref 140–500)
POTASSIUM SERPL-SCNC: 4 MMOL/L (ref 3.5–5.2)
PROT SERPL-MCNC: 7 G/DL (ref 6–8.5)
RBC # BLD AUTO: 3.73 10*6/MM3 (ref 3.9–5.2)
SODIUM SERPL-SCNC: 147 MMOL/L (ref 136–145)
TRIGL SERPL-MCNC: 63 MG/DL (ref 0–150)
TSH SERPL-ACNC: 0.47 MIU/ML (ref 0.27–4.2)
VLDLC SERPL-MCNC: 12.6 MG/DL (ref 5–40)
WBC # BLD AUTO: 8.74 10*3/MM3 (ref 4.5–10.7)

## 2018-11-09 PROCEDURE — 99214 OFFICE O/P EST MOD 30 MIN: CPT | Performed by: INTERNAL MEDICINE

## 2018-11-09 PROCEDURE — G0438 PPPS, INITIAL VISIT: HCPCS | Performed by: INTERNAL MEDICINE

## 2018-11-09 RX ORDER — CLARITHROMYCIN 500 MG/1
TABLET, COATED ORAL
Qty: 2 TABLET | Refills: 3 | Status: SHIPPED | OUTPATIENT
Start: 2018-11-09 | End: 2019-02-27

## 2018-11-09 NOTE — PROGRESS NOTES
QUICK REFERENCE INFORMATION:  The ABCs of the Annual Wellness Visit    Initial Medicare Wellness Visit    HEALTH RISK ASSESSMENT    1940    Recent Hospitalizations:  No hospitalization(s) within the last year..        Current Medical Providers:  Patient Care Team:  Natasha Billy MD as PCP - General (Internal Medicine)  Natalia Fu MD as Consulting Physician (Hematology and Oncology)  Zaria Bryan MD as Consulting Physician (Cardiology)        Smoking Status:  History   Smoking Status   • Former Smoker   • Packs/day: 1.00   • Years: 20.00   • Types: Cigarettes   Smokeless Tobacco   • Never Used     Comment: QUIT 2005       Alcohol Consumption:  History   Alcohol Use   • Yes     Comment: 1 DRINK A WEEK   ///   caffeine use       Depression Screen:   PHQ-2/PHQ-9 Depression Screening 11/9/2018   Little interest or pleasure in doing things 0   Feeling down, depressed, or hopeless 0   Trouble falling or staying asleep, or sleeping too much 0   Feeling tired or having little energy 0   Poor appetite or overeating 0   Feeling bad about yourself - or that you are a failure or have let yourself or your family down 0   Trouble concentrating on things, such as reading the newspaper or watching television 0   Moving or speaking so slowly that other people could have noticed. Or the opposite - being so fidgety or restless that you have been moving around a lot more than usual 0   Thoughts that you would be better off dead, or of hurting yourself in some way 0   Total Score 0       Health Habits and Functional and Cognitive Screening:  Functional & Cognitive Status 11/9/2018   Do you have difficulty preparing food and eating? No   Do you have difficulty bathing yourself, getting dressed or grooming yourself? No   Do you have difficulty using the toilet? No   Do you have difficulty moving around from place to place? No   Do you have trouble with steps or getting out of a bed or a chair? Yes   In the past year  have you fallen or experienced a near fall? No   Do you need help using the phone?  No   Are you deaf or do you have serious difficulty hearing?  No   Do you need help with transportation? No   Do you need help shopping? No   Do you need help preparing meals?  No   Do you need help with housework?  No   Do you need help with laundry? No   Do you need help taking your medications? No   Do you need help managing money? No   Do you ever drive or ride in a car without wearing a seat belt? No   Do you have difficulty concentrating, remembering or making decisions? No           Does the patient have evidence of cognitive impairment? No    Asiprin use counseling: Taking ASA appropriately as indicated      Recent Lab Results:    Visual Acuity:  No exam data present    Age-appropriate Screening Schedule:  Refer to the list below for future screening recommendations based on patient's age, sex and/or medical conditions. Orders for these recommended tests are listed in the plan section. The patient has been provided with a written plan.    Health Maintenance   Topic Date Due   • ZOSTER VACCINE (1 of 2) 10/02/1990   • TDAP/TD VACCINES (1 - Tdap) 10/06/2015   • INFLUENZA VACCINE  08/01/2018   • MAMMOGRAM  12/19/2018   • LIPID PANEL  06/25/2019   • DXA SCAN  12/19/2019   • COLONOSCOPY  06/22/2025   • PNEUMOCOCCAL VACCINES (65+ LOW/MEDIUM RISK)  Completed        Subjective   History of Present Illness    Dory Hampton is a 78 y.o. female who presents for an Annual Wellness Visit.    The following portions of the patient's history were reviewed and updated as appropriate: allergies, current medications, past family history, past medical history, past social history, past surgical history and problem list.    Outpatient Medications Prior to Visit   Medication Sig Dispense Refill   • aspirin 81 MG EC tablet Take 1 tablet by mouth Daily.     • betamethasone valerate (VALISONE) 0.1 % cream Apply 1 application topically Daily As Needed.  "    • Cyanocobalamin (VITAMIN B-12 PO) Take 1,000 mcg by mouth daily.     • FEROSUL 325 (65 Fe) MG tablet TAKE 1 TABLET BY MOUTH DAILY 30 tablet 0   • losartan (COZAAR) 100 MG tablet Take 1 tablet by mouth Daily. 90 tablet 3   • metoprolol tartrate (LOPRESSOR) 50 MG tablet Take 1 tablet by mouth 2 (Two) Times a Day. 180 tablet 1   • Multiple Vitamins-Minerals (CENTRUM SILVER PO) Take 1 tablet by mouth Daily. PT HOLDING FOR SURGERY     • Pyridoxine HCl (VITAMIN B6 PO) Take 100 mg by mouth daily.     • SYNTHROID 88 MCG tablet TAKE 1 TABLET DAILY 90 tablet 3   • clarithromycin (BIAXIN) 500 MG tablet Take 1 tablet by mouth As Needed (take one tablet one hour prior to dental procedure). Prior to dental procedures 2 tablet 0   • clarithromycin (BIAXIN) 500 MG tablet TAKE 1 TABLET BY MOUTH 1 HOUR PRIOR TO DENTAL PROCEDURE 2 tablet 0     No facility-administered medications prior to visit.        Patient Active Problem List   Diagnosis   • Essential hypertension   • Hyperlipidemia   • Hypothyroidism, adult   • Thoracic aorta atherosclerosis (CMS/HCC)   • Iron deficiency anemia due to chronic blood loss   • Macrocytic anemia   • Hemolytic anemia, mechanical (CMS/HCC)   • Dyspnea on effort   • AVM (arteriovenous malformation) of small bowel, acquired with hemorrhage (CMS/HCC)   • S/P AVR       Advance Care Planning:  has an advance directive - a copy has been provided and is in file    Identification of Risk Factors:  Risk factors include: inactivity.    Review of Systems    Compared to one year ago, the patient feels her physical health is the same.  Compared to one year ago, the patient feels her mental health is the same.    Objective     Physical Exam    Vitals:    11/09/18 1309   BP: 144/76   BP Location: Left arm   Patient Position: Sitting   Cuff Size: Adult   Pulse: 57   Resp: 14   Temp: 96.8 °F (36 °C)   TempSrc: Temporal Artery    SpO2: 95%   Weight: 65.8 kg (145 lb)   Height: 154.9 cm (61\")   PainSc: 0-No pain "       Patient's Body mass index is 27.4 kg/m². BMI is above normal parameters. Recommendations include: prudent diet and regular exercise recommended.      Assessment/Plan   Patient Self-Management and Personalized Health Advice  The patient has been provided with information about: diet, exercise and weight management and preventive services including:   · she will make appointment for mammogram in December.    Visit Diagnoses:  No diagnosis found.    No orders of the defined types were placed in this encounter.      Outpatient Encounter Prescriptions as of 11/9/2018   Medication Sig Dispense Refill   • aspirin 81 MG EC tablet Take 1 tablet by mouth Daily.     • betamethasone valerate (VALISONE) 0.1 % cream Apply 1 application topically Daily As Needed.     • Cyanocobalamin (VITAMIN B-12 PO) Take 1,000 mcg by mouth daily.     • FEROSUL 325 (65 Fe) MG tablet TAKE 1 TABLET BY MOUTH DAILY 30 tablet 0   • losartan (COZAAR) 100 MG tablet Take 1 tablet by mouth Daily. 90 tablet 3   • metoprolol tartrate (LOPRESSOR) 50 MG tablet Take 1 tablet by mouth 2 (Two) Times a Day. 180 tablet 1   • Multiple Vitamins-Minerals (CENTRUM SILVER PO) Take 1 tablet by mouth Daily. PT HOLDING FOR SURGERY     • Pyridoxine HCl (VITAMIN B6 PO) Take 100 mg by mouth daily.     • SYNTHROID 88 MCG tablet TAKE 1 TABLET DAILY 90 tablet 3   • [DISCONTINUED] clarithromycin (BIAXIN) 500 MG tablet Take 1 tablet by mouth As Needed (take one tablet one hour prior to dental procedure). Prior to dental procedures 2 tablet 0   • [DISCONTINUED] clarithromycin (BIAXIN) 500 MG tablet TAKE 1 TABLET BY MOUTH 1 HOUR PRIOR TO DENTAL PROCEDURE 2 tablet 0     No facility-administered encounter medications on file as of 11/9/2018.        Reviewed use of high risk medication in the elderly: yes  (risk of bleeding with ASA)  Reviewed for potential of harmful drug interactions in the elderly: not applicable    Follow Up:  No Follow-up on file.     An After Visit Summary  and PPPS with all of these plans were given to the patient.

## 2018-11-09 NOTE — PATIENT INSTRUCTIONS
Medicare Wellness  Personal Prevention Plan of Service     Date of Office Visit:  2018  Encounter Provider:  Natasha Billy MD  Place of Service:  [facility]  Patient Name: Dory Hampton  :  1940    As part of the Medicare Wellness portion of your visit today, we are providing you with this personalized preventive plan of services (PPPS). This plan is based upon recommendations of the [place] Preventive Services Task Force (USPSTF) and the Advisory Committee on Immunization Practices (ACIP).    This lists the preventive care services that should be considered, and provides dates of when you are due. Items listed as completed are up-to-date and do not require any further intervention.    Health Maintenance   Topic Date Due   • ZOSTER VACCINE (1 of 2) 10/02/1990   • TDAP/TD VACCINES (1 - Tdap) 10/06/2015   • MEDICARE ANNUAL WELLNESS  2016   • INFLUENZA VACCINE  2018   • MAMMOGRAM  2018   • LIPID PANEL  2019   • DXA SCAN  2019   • COLONOSCOPY  2025   • PNEUMOCOCCAL VACCINES (65+ LOW/MEDIUM RISK)  Completed       No orders of the defined types were placed in this encounter.    Check with your pharmacy about the new shingles vaccine. Also, the Tdap.  You had a Td in .     No Follow-up on file.

## 2018-11-09 NOTE — PROGRESS NOTES
Chief Complaint   Patient presents with   • Medicare Wellness-Initial Visit     Physical   • Hyperlipidemia   • Hypertension   • Hypothyroidism   • URI     x2-3 weeks       Subjective   Dory Hampton is a 78 y.o. female.     History of Present Illness     Hyperlipidemia:     Her most recent lipid panel was done on 6/25/2018.    Total cholesterol was 214, Triglycerides 101, HDL 71, .   Current treatment: Dietary modification.  She has previously taken a statin but did not tolerate it.    Hypertension: This is a chronic problem.  No changes in management were made at her last appointment.  She denies problems with headaches, confusion, visual disturbance, dizziness, shortness of breath.  She has not been checking her blood pressures at home.  Her machine has not been working.  She does not have associated chest pain, PND, orthopnea, edema or syncope. she currently takes losartan and metoprolol.  By report, there is good compliance with treatment, good tolerance of treatment.       Hypothyroidism: Current treatment levothyroxine. She is compliant with the medication, tolerates it well and reports good control of symptoms of hypothyroidism.  She has not noted any change in tolerance of heat or cold.  No change in hair or skin.  No constipation.  No symptoms of hyperthyroidism.       Respiratory symptoms: She has had some head congestion for the last week or 2.  She believes it is due to allergies.  She has been taking Mucinex.  This has helped.  No fevers, chills, sweats.    History of gastrointestinal AVMs, iron deficiency anemia.  She continues to take an iron supplement.       The following portions of the patient's history were reviewed and updated as appropriate: allergies, current medications, past family history, past medical history, past social history, past surgical history and problem list.    Review of Systems   Constitutional: Negative for appetite change.   HENT: Negative for nosebleeds.    Eyes:  "Negative for blurred vision and double vision.   Respiratory: Negative for cough and shortness of breath.    Cardiovascular: Negative for chest pain, palpitations and leg swelling.   Neurological: Negative for dizziness and headache.   Psychiatric/Behavioral: Negative for sleep disturbance.         Current Outpatient Prescriptions:   •  aspirin 81 MG EC tablet, Take 1 tablet by mouth Daily., Disp: , Rfl:   •  betamethasone valerate (VALISONE) 0.1 % cream, Apply 1 application topically Daily As Needed., Disp: , Rfl:   •  Cyanocobalamin (VITAMIN B-12 PO), Take 1,000 mcg by mouth daily., Disp: , Rfl:   •  FEROSUL 325 (65 Fe) MG tablet, TAKE 1 TABLET BY MOUTH DAILY, Disp: 30 tablet, Rfl: 0  •  losartan (COZAAR) 100 MG tablet, Take 1 tablet by mouth Daily., Disp: 90 tablet, Rfl: 3  •  metoprolol tartrate (LOPRESSOR) 50 MG tablet, Take 1 tablet by mouth 2 (Two) Times a Day., Disp: 180 tablet, Rfl: 1  •  Multiple Vitamins-Minerals (CENTRUM SILVER PO), Take 1 tablet by mouth Daily. PT HOLDING FOR SURGERY, Disp: , Rfl:   •  Pyridoxine HCl (VITAMIN B6 PO), Take 100 mg by mouth daily., Disp: , Rfl:   •  SYNTHROID 88 MCG tablet, TAKE 1 TABLET DAILY, Disp: 90 tablet, Rfl: 3  •  clarithromycin (BIAXIN) 500 MG tablet, Take one tablet by mouth one hour before dental procedure., Disp: 2 tablet, Rfl: 3        Objective     /76 (BP Location: Left arm, Patient Position: Sitting, Cuff Size: Adult)   Pulse 57   Temp 96.8 °F (36 °C) (Temporal Artery )   Resp 14   Ht 154.9 cm (61\") Comment: Updated Height  Wt 65.8 kg (145 lb)   LMP  (LMP Unknown)   SpO2 95%   Breastfeeding? No   BMI 27.40 kg/m²     Physical Exam   Constitutional: She is oriented to person, place, and time. She appears well-developed and well-nourished. No distress.   Neck: Normal carotid pulses present. Carotid bruit is not present.   Cardiovascular: Regular rhythm, S1 normal and S2 normal.  Exam reveals no gallop and no friction rub.    Murmur heard.   " Systolic murmur is present with a grade of 3/6   Pulses:       Carotid pulses are 2+ on the right side, and 2+ on the left side.  Pulmonary/Chest: Effort normal and breath sounds normal. She has no wheezes. She has no rhonchi. She has no rales. Chest wall is not dull to percussion.   Musculoskeletal: She exhibits no edema.   Neurological: She is alert and oriented to person, place, and time.   Skin: Skin is warm and dry.   Nursing note and vitals reviewed.        Assessment/Plan   Dory was seen today for medicare wellness-initial visit, hyperlipidemia, hypertension, hypothyroidism and uri.    Diagnoses and all orders for this visit:    Initial Medicare annual wellness visit    Essential hypertension  -     Comprehensive Metabolic Panel; Future  -     CBC (No Diff); Future  -     Lipid Panel; Future    Hyperlipidemia, unspecified hyperlipidemia type    Iron deficiency anemia due to chronic blood loss  -     Iron; Future    Hypothyroidism, adult  -     TSH Rfx On Abnormal To Free T4; Future    Other orders  -     clarithromycin (BIAXIN) 500 MG tablet; Take one tablet by mouth one hour before dental procedure.      She will continue prudent diet, regular exercise.  Clarithromycin refilled today to take prior to dental procedures.  OK to continue mucinex.

## 2018-11-15 RX ORDER — FERROUS SULFATE 325(65) MG
TABLET ORAL
Qty: 30 TABLET | Refills: 5 | Status: SHIPPED | OUTPATIENT
Start: 2018-11-15 | End: 2019-02-12

## 2019-01-23 RX ORDER — LOSARTAN POTASSIUM 100 MG/1
TABLET ORAL
Qty: 90 TABLET | Refills: 3 | Status: SHIPPED | OUTPATIENT
Start: 2019-01-23 | End: 2019-03-11

## 2019-02-12 ENCOUNTER — APPOINTMENT (OUTPATIENT)
Dept: WOMENS IMAGING | Facility: HOSPITAL | Age: 79
End: 2019-02-12

## 2019-02-12 ENCOUNTER — OFFICE VISIT (OUTPATIENT)
Dept: INTERNAL MEDICINE | Facility: CLINIC | Age: 79
End: 2019-02-12

## 2019-02-12 VITALS
BODY MASS INDEX: 26.43 KG/M2 | HEIGHT: 61 IN | DIASTOLIC BLOOD PRESSURE: 68 MMHG | SYSTOLIC BLOOD PRESSURE: 142 MMHG | WEIGHT: 140 LBS

## 2019-02-12 DIAGNOSIS — M25.551 RIGHT HIP PAIN: Primary | ICD-10-CM

## 2019-02-12 PROCEDURE — 72110 X-RAY EXAM L-2 SPINE 4/>VWS: CPT | Performed by: RADIOLOGY

## 2019-02-12 PROCEDURE — 72110 X-RAY EXAM L-2 SPINE 4/>VWS: CPT | Performed by: INTERNAL MEDICINE

## 2019-02-12 PROCEDURE — 73502 X-RAY EXAM HIP UNI 2-3 VIEWS: CPT | Performed by: INTERNAL MEDICINE

## 2019-02-12 PROCEDURE — 99213 OFFICE O/P EST LOW 20 MIN: CPT | Performed by: INTERNAL MEDICINE

## 2019-02-12 PROCEDURE — 73502 X-RAY EXAM HIP UNI 2-3 VIEWS: CPT | Performed by: RADIOLOGY

## 2019-02-12 RX ORDER — METOPROLOL TARTRATE 50 MG/1
TABLET, FILM COATED ORAL
Qty: 180 TABLET | Refills: 1 | Status: SHIPPED | OUTPATIENT
Start: 2019-02-12 | End: 2019-09-26 | Stop reason: SDUPTHER

## 2019-02-12 NOTE — PROGRESS NOTES
Chief Complaint   Patient presents with   • Back Pain     lower back pain causing issues with walking   • Eye Trauma     no trauma or pain but had redness of eye and also an episode of blood when washe dmouth out after       Subjective   Dory Hampton is a 78 y.o. female.     History of Present Illness     Back pain: She has been experiencing pain in her right low back.  This started about 1 week ago.  She does not recall any particular incident that caused the pain to begin.  It is a constant ache.  If she tries to walk, she feels off balance and she has to hold onto something.  The pain is worse when she walks.  She does not think it is making her legs feel weak.  However, she does limp.  The pain can radiate to the right groin sometimes.  It does not go down her leg.  She has not had problems urinating.  No problems with constipation.  No fevers, chills, sweats.  No skin rashes.  Today, she is feeling a little bit better.    She recently had redness of her left thigh.  This is cleared up.  She also had an episode of bleeding after brushing her teeth vigorously.  That has also cleared up.      The following portions of the patient's history were reviewed and updated as appropriate: allergies, current medications, past family history, past medical history, past social history, past surgical history and problem list.    Review of Systems   Constitutional: Negative for appetite change, chills and fever.   Cardiovascular: Negative for chest pain and leg swelling.   Musculoskeletal: Positive for back pain and gait problem.         Current Outpatient Medications:   •  aspirin 81 MG EC tablet, Take 1 tablet by mouth Daily., Disp: , Rfl:   •  betamethasone valerate (VALISONE) 0.1 % cream, Apply 1 application topically Daily As Needed., Disp: , Rfl:   •  clarithromycin (BIAXIN) 500 MG tablet, Take one tablet by mouth one hour before dental procedure., Disp: 2 tablet, Rfl: 3  •  Cyanocobalamin (VITAMIN B-12 PO), Take 1,000  "mcg by mouth daily., Disp: , Rfl:   •  losartan (COZAAR) 100 MG tablet, TAKE 1 TABLET DAILY, Disp: 90 tablet, Rfl: 3  •  metoprolol tartrate (LOPRESSOR) 50 MG tablet, TAKE 1 TABLET BY MOUTH TWICE DAILY, Disp: 180 tablet, Rfl: 1  •  Multiple Vitamins-Minerals (CENTRUM SILVER PO), Take 1 tablet by mouth Daily. PT HOLDING FOR SURGERY, Disp: , Rfl:   •  Pyridoxine HCl (VITAMIN B6 PO), Take 100 mg by mouth daily., Disp: , Rfl:   •  SYNTHROID 88 MCG tablet, TAKE 1 TABLET DAILY, Disp: 90 tablet, Rfl: 3        Objective     /68   Ht 154.9 cm (61\")   Wt 63.5 kg (140 lb)   LMP  (LMP Unknown)   BMI 26.45 kg/m²     Physical Exam   Constitutional: She is oriented to person, place, and time. She appears well-developed and well-nourished.   Using a wheelchair   Cardiovascular: Normal rate and regular rhythm. Exam reveals no gallop and no friction rub.   Murmur heard.  Pulmonary/Chest: Effort normal and breath sounds normal. No respiratory distress.   Musculoskeletal:   There is no pain to palpation of her lumbar spine.  Palpation over the right sacrum causes some discomfort.   Neurological: She is alert and oriented to person, place, and time. Gait (limping) abnormal.   Skin: Skin is warm and dry.   Nursing note and vitals reviewed.        Assessment/Plan   Dory was seen today for back pain and eye trauma.    Diagnoses and all orders for this visit:    Right hip pain  -     XR Spine Lumbar 4+ View (In Office)  -     XR Hip With or Without Pelvis 2 - 3 View Right; Future      Discussed.  Will further evaluate with lumbar spine films and hip films.  Advised her she could take Tylenol.  She has been using heat and ice.  Advised her she could continue to do that.       "

## 2019-02-15 ENCOUNTER — DOCUMENTATION (OUTPATIENT)
Dept: INTERNAL MEDICINE | Facility: CLINIC | Age: 79
End: 2019-02-15

## 2019-02-15 DIAGNOSIS — M16.10 HIP ARTHRITIS: Primary | ICD-10-CM

## 2019-02-27 ENCOUNTER — OFFICE VISIT (OUTPATIENT)
Dept: CARDIOLOGY | Facility: CLINIC | Age: 79
End: 2019-02-27

## 2019-02-27 VITALS
SYSTOLIC BLOOD PRESSURE: 140 MMHG | WEIGHT: 144 LBS | HEART RATE: 51 BPM | DIASTOLIC BLOOD PRESSURE: 70 MMHG | HEIGHT: 61 IN | BODY MASS INDEX: 27.19 KG/M2

## 2019-02-27 DIAGNOSIS — Z95.2 S/P AVR: ICD-10-CM

## 2019-02-27 DIAGNOSIS — R06.09 DYSPNEA ON EXERTION: Primary | ICD-10-CM

## 2019-02-27 DIAGNOSIS — R00.1 SINUS BRADYCARDIA: ICD-10-CM

## 2019-02-27 DIAGNOSIS — I10 ESSENTIAL HYPERTENSION: ICD-10-CM

## 2019-02-27 PROCEDURE — 93000 ELECTROCARDIOGRAM COMPLETE: CPT | Performed by: NURSE PRACTITIONER

## 2019-02-27 PROCEDURE — 99214 OFFICE O/P EST MOD 30 MIN: CPT | Performed by: NURSE PRACTITIONER

## 2019-02-27 NOTE — PROGRESS NOTES
Date of Office Visit: 2019  Encounter Provider: KEVIN Manjarrez  Place of Service: [facility] CARDIOLOGY  Patient Name: Dory Hampton  :1940      Chief Complaint   Patient presents with   • Follow-up   :     Dear Dr. Billy,     HPI: Dory Hampton is a pleasant 78 y.o. female who presents today for cardiac follow up. She is a new patient to me and her previous records have been reviewed.  She has a history of anemia, hypertension, hyperlipidemia, hypothyroidism, was a former cigarette smoker and quit in .    She has a history of a heart murmur from severe aortic stenosis.  In 2016 she underwent a cardiac catheterization which revealed nonobstructive coronary artery disease and severe aortic valve stenosis.  Carotid duplex studies were completed 2016 which revealed mild bilateral internal carotid artery stenosis.  On 16 she underwent mini sternotomy surgery and received a 21 mm magna pericardial prosthetic aortic valve replacement.  She developed postoperative atrial fibrillation, pleural effusions, and anemia.  The pleural effusion resolved with diuretic therapy and she was placed on warfarin for stroke prevention.  Her amiodarone was discontinued in 2017 and follow-up Holter monitor showed no recurrence of atrial fibrillation.  Warfarin was then discontinued.    She was last evaluated by Dr. Zaria Bryan in 2018.  She reported dry skin so her Hyzaar was discontinued and she was placed on losartan 100 mg daily.  She had a follow-up CMP in 2018 which showed normal kidney function.    She presents today for follow-up.  Her blood pressure is borderline elevated and heart rate 51 bpm.  She reports new onset dyspnea upon exertion when going up steps.  Her dry skin improved although it still present at times.  She denies chest pain, PND, orthopnea, cough, edema, palpitations, dizziness, or syncope.      Past Medical  History:   Diagnosis Date   • Abnormal electrocardiogram    • Anemia    • Aortic stenosis     severe; s/p repair   • Asthma     HISTORY   • Bradycardia    • Coronary artery disease    • Diastolic dysfunction     grade II   • Environmental allergies    • Female stress incontinence    • History of diverticulosis    • History of transfusion    • Hyperlipidemia    • Hypertension    • Hypokalemia    • Hypothyroid    • Iron deficiency anemia     hemolytic anemia   • Left ventricular hypertrophy    • Murmur    • PAF (paroxysmal atrial fibrillation) (CMS/HCC)     with RVR   • Pleural effusion     bilateral, post op   • Psoriasis    • Tendinitis    • Thoracic aorta atherosclerosis (CMS/HCC)        Past Surgical History:   Procedure Laterality Date   • AORTIC VALVE REPAIR/REPLACEMENT N/A 12/16/2016    Procedure: BHARGAVI MINI STERNOTOMY AORTIC VALVE REPLACEMENT;  Surgeon: Robin Jones MD;  Location: [facility] MAIN OR;  Service:    • APPENDECTOMY  06/1972   • CAPSULE ENDOSCOPY  2015   • CARDIAC CATHETERIZATION N/A 8/25/2016    Procedure: Coronary angiography;  Surgeon: Lulú Hooper MD;  Location: [facility] CATH INVASIVE LOCATION;  Service:    • CARDIAC CATHETERIZATION N/A 8/25/2016    Procedure: Right Heart Cath;  Surgeon: Lulú Hooper MD;  Location: [facility] CATH INVASIVE LOCATION;  Service:    • CATARACT EXTRACTION Bilateral     Left 12/10/13, Right 12/17/2013   • CATARACT EXTRACTION     • COLONOSCOPY  06/22/2015    Dr. Mohamud   • DENTAL PROCEDURE  2011    Implants   • ENDOSCOPY  06/22/2015    Dr. Mohamud   • ENTEROSCOPY SMALL BOWEL N/A 11/21/2016    Procedure: ENTEROSCOPY SMALL BOWEL WITH APC CAUTERY;  Surgeon: Tj Torres MD;  [facility];  Service:    • PELVIC LAPAROSCOPY     • STERNOTOMY      mini   • TONSILLECTOMY     • TUBAL ABDOMINAL LIGATION         Social History     Socioeconomic History   • Marital status:      Spouse name: Kody   • Number of children: Not on file   • Years of  education: College   • Highest education level: Not on file   Social Needs   • Financial resource strain: Not on file   • Food insecurity - worry: Not on file   • Food insecurity - inability: Not on file   • Transportation needs - medical: Not on file   • Transportation needs - non-medical: Not on file   Occupational History   • Occupation: Teacher, retired     Comment: [employer]   Tobacco Use   • Smoking status: Former Smoker     Packs/day: 1.00     Years: 20.00     Pack years: 20.00     Types: Cigarettes     Last attempt to quit:      Years since quittin.1   • Smokeless tobacco: Never Used   Substance and Sexual Activity   • Alcohol use: Yes     Comment: 1 DRINK A WEEK   ///   caffeine use   • Drug use: No   • Sexual activity: No   Other Topics Concern   • Not on file   Social History Narrative   • Not on file       Family History   Problem Relation Age of Onset   • Endometriosis Mother    • Dementia Mother    • Hypertension Mother    • Uterine cancer Mother    • Alzheimer's disease Mother    • Tongue cancer Father    • Heart failure Maternal Grandmother    • Heart attack Maternal Grandmother    • Stroke Maternal Grandfather    • Heart disease Maternal Grandfather    • Hypertension Maternal Aunt    • Hypertension Maternal Uncle        The following portion of the patient's history were reviewed and updated as appropriate: past medical history, past surgical history, past social history, past family history, allergies, current medications, and problem list.    Review of Systems   Constitution: Negative for chills, diaphoresis, fever, malaise/fatigue, night sweats, weight gain and weight loss.   HENT: Negative for hearing loss, nosebleeds, sore throat and tinnitus.    Eyes: Negative for blurred vision, double vision, pain and visual disturbance.   Cardiovascular: Positive for dyspnea on exertion. Negative for chest pain, claudication, cyanosis, irregular heartbeat, leg swelling, near-syncope, orthopnea,  palpitations, paroxysmal nocturnal dyspnea and syncope.   Respiratory: Negative for cough, hemoptysis, snoring and wheezing.    Endocrine: Negative for cold intolerance, heat intolerance and polyuria.   Hematologic/Lymphatic: Negative for bleeding problem. Does not bruise/bleed easily.   Skin: Negative for color change, dry skin, flushing and itching.   Musculoskeletal: Positive for myalgias. Negative for falls, joint pain, joint swelling, muscle cramps and muscle weakness.   Gastrointestinal: Negative for abdominal pain, constipation, heartburn, melena, nausea and vomiting.   Genitourinary: Negative for dysuria and hematuria.   Neurological: Negative for excessive daytime sleepiness, dizziness, light-headedness, loss of balance, numbness, paresthesias, seizures and vertigo.   Psychiatric/Behavioral: Negative for altered mental status, depression, memory loss and substance abuse. The patient does not have insomnia and is not nervous/anxious.    Allergic/Immunologic: Negative for environmental allergies.       Allergies   Allergen Reactions   • Eggs Or Egg-Derived Products Swelling   • Formaldehyde Other (See Comments)     Positive allergy testing   • Other Swelling     Tree nuts, pecans, walnuts, eggs        Mouth swells   • Penicillins Rash   • Sulfa Antibiotics Rash         Current Outpatient Medications:   •  aspirin 81 MG EC tablet, Take 1 tablet by mouth Daily., Disp: , Rfl:   •  Cyanocobalamin (VITAMIN B-12 PO), Take 1,000 mcg by mouth daily., Disp: , Rfl:   •  losartan (COZAAR) 100 MG tablet, TAKE 1 TABLET DAILY, Disp: 90 tablet, Rfl: 3  •  metoprolol tartrate (LOPRESSOR) 50 MG tablet, TAKE 1 TABLET BY MOUTH TWICE DAILY, Disp: 180 tablet, Rfl: 1  •  Multiple Vitamins-Minerals (CENTRUM SILVER PO), Take 1 tablet by mouth Daily. PT HOLDING FOR SURGERY, Disp: , Rfl:   •  Pyridoxine HCl (VITAMIN B6 PO), Take 100 mg by mouth daily., Disp: , Rfl:   •  SYNTHROID 88 MCG tablet, TAKE 1 TABLET DAILY, Disp: 90 tablet,  "Rfl: 3        Objective:     Vitals:    02/27/19 1329   BP: 140/70   BP Location: Left arm   Pulse: 51   Weight: 65.3 kg (144 lb)   Height: 154.9 cm (61\")     Body mass index is 27.21 kg/m².    PHYSICAL EXAM:    Vitals Reviewed.   General Appearance: No acute distress, well developed and well nourished.   Eyes: Conjunctiva and lids: No erythema, swelling, or discharge. Sclera non-icteric.   HENT: Atraumatic, normocephalic. External eyes, ears, and nose normal. No hearing loss noted. Mucous membranes normal. Lips not cyanotic. Neck supple with no tenderness.  Respiratory: No signs of respiratory distress. Respiration rhythm and depth normal.   Clear to auscultation. No rales, crackles, rhonchi, or wheezing auscultated.   Cardiovascular:  Jugular Venous Pressure: Normal  Heart Rate and Rhythm: Normal rhythm; bradycardic.  Heart Sounds: Normal S1 and S2. No S3 or S4 noted.  Murmurs: No murmurs noted. No rubs, thrills, or gallops.   Arterial Pulses: Carotid pulses normal. No carotid bruit noted. Posterior tibialis and dorsalis pedis pulses normal.   Lower Extremities: No edema noted.  Gastrointestinal:  Abdomen soft, non-distended, non-tender. Normal bowel sounds. No hepatomegaly.   Musculoskeletal: Normal movement of extremities  Skin and Nails: General appearance normal. No pallor, cyanosis, diaphoresis. Skin temperature normal. No clubbing of fingernails.   Psychiatric: Patient alert and oriented to person, place, and time. Speech and behavior appropriate. Normal mood and affect.       ECG 12 Lead  Date/Time: 2/27/2019 1:32 PM  Performed by: Starr Ho APRN  Authorized by: Starr Ho APRN   Comparison: compared with previous ECG from 2/28/2018  Similar to previous ECG  Rhythm: sinus rhythm  Rate: bradycardic  BPM: 51  Conduction: conduction normal  ST Segments: ST segments normal  T inversion: aVL and V2  QRS axis: normal    Clinical impression: non-specific ECG              Assessment:       Diagnosis " Plan   1. Dyspnea on exertion  Adult Transthoracic Echo Complete W/ Cont if Necessary Per Protocol   2. S/P AVR  Adult Transthoracic Echo Complete W/ Cont if Necessary Per Protocol   3. Essential hypertension  Adult Transthoracic Echo Complete W/ Cont if Necessary Per Protocol   4. Sinus bradycardia  Adult Transthoracic Echo Complete W/ Cont if Necessary Per Protocol          Plan:       1.  Dyspnea on Exertion: New onset and occurs with steps.  I have recommended a repeat 2D echocardiogram.    2.  Aortic Stenosis: Status post tissue aortic valve replacement 2016.  Repeat echocardiogram.    3.  Postoperative Atrial Fibrillation: Previously on warfarin and amiodarone, both discontinued.  Follow-up Holter monitor in March 2017 showed no recurrent atrial fibrillation.    Atrial Fibrillation and Atrial Flutter  Assessment  • The patient has paroxysmal atrial fibrillation  • The patient's CHADS2-VASc score is 5  • A [id number] score of 2 or more is considered a high risk for a thromboembolic event  • Aspirin prescribed    Plan  • Attempt to maintain sinus rhythm  • Continue aspirin for antithrombotic therapy, bleeding issues discussed  • Continue beta blocker for rate control    Subjective - Objective  • No recurrent atrial fibrillation      4.  Coronary Artery Disease: Noted to be mild, nonobstructive per cardiac catheterization 2016.  Continue aspirin and metoprolol.    Coronary Artery Disease  Assessment  • The patient has no angina    Plan  • Lifestyle modifications discussed include adhering to a heart healthy diet, maintenance of a healthy weight, medication compliance, regular exercise and regular monitoring of cholesterol and blood pressure    Subjective - Objective  • Current antiplatelet therapy includes aspirin 81 mg      5.  Hypertension: Blood pressure borderline elevated today.  I recommended reduction of sodium in her diet less than 2000 mg daily.  Call if blood pressure consistently greater than  140/90.    6.  Bradycardia: Heart rate 51 bpm today and she is asymptomatic.    7.  Follow-up with Dr. Zaria Bryan in 1 year, unless otherwise needed sooner.    As always, it has been a pleasure to participate in your patient's care. Thank you.       Sincerely,         KEVIN Jordan        **Dragon Disclaimer:**  Much of this encounter note is an electronic transcription/translation of spoken language to printed text. The electronic translation of spoken language may permit erroneous, or at times, nonsensical words or phrases to be inadvertently transcribed. Although I have reviewed the note for such errors, some may still exist.

## 2019-03-01 ENCOUNTER — TELEPHONE (OUTPATIENT)
Dept: CARDIOLOGY | Facility: CLINIC | Age: 79
End: 2019-03-01

## 2019-03-01 NOTE — TELEPHONE ENCOUNTER
Pt called and wants to know if she can take tylenol or ibuprofen for hip pain. S/w Dr Daivs and she states that they prefer Tylenol but she can take ibuprofen occasional.     called and informed pt. Verbally understood......Anna LIU

## 2019-03-06 ENCOUNTER — HOSPITAL ENCOUNTER (OUTPATIENT)
Dept: CARDIOLOGY | Facility: HOSPITAL | Age: 79
Discharge: HOME | End: 2019-03-06
Admitting: NURSE PRACTITIONER

## 2019-03-06 VITALS
DIASTOLIC BLOOD PRESSURE: 60 MMHG | SYSTOLIC BLOOD PRESSURE: 180 MMHG | WEIGHT: 144 LBS | HEIGHT: 61 IN | BODY MASS INDEX: 27.19 KG/M2 | HEART RATE: 54 BPM

## 2019-03-06 DIAGNOSIS — Z95.2 S/P AVR: ICD-10-CM

## 2019-03-06 DIAGNOSIS — I10 ESSENTIAL HYPERTENSION: ICD-10-CM

## 2019-03-06 DIAGNOSIS — R06.09 DYSPNEA ON EXERTION: ICD-10-CM

## 2019-03-06 DIAGNOSIS — R00.1 SINUS BRADYCARDIA: ICD-10-CM

## 2019-03-06 LAB
ASCENDING AORTA: 3 CM
BH CV ECHO MEAS - % LVPW THICK: 356.3 %
BH CV ECHO MEAS - ACS: 1.3 CM
BH CV ECHO MEAS - AO MAX PG (FULL): 16.4 MMHG
BH CV ECHO MEAS - AO MAX PG: 25 MMHG
BH CV ECHO MEAS - AO MEAN PG (FULL): 8 MMHG
BH CV ECHO MEAS - AO MEAN PG: 13 MMHG
BH CV ECHO MEAS - AO ROOT AREA: 4.2 CM^2
BH CV ECHO MEAS - AO ROOT DIAM: 2.3 CM
BH CV ECHO MEAS - AO V2 MAX: 250 CM/SEC
BH CV ECHO MEAS - AO V2 MEAN: 165 CM/SEC
BH CV ECHO MEAS - AO V2 VTI: 68.3 CM
BH CV ECHO MEAS - AVA(I,A): 1.2 CM^2
BH CV ECHO MEAS - AVA(I,D): 1.2 CM^2
BH CV ECHO MEAS - AVA(V,A): 1.2 CM^2
BH CV ECHO MEAS - AVA(V,D): 1.2 CM^2
BH CV ECHO MEAS - EDV(MOD-SP4): 98.4 ML
BH CV ECHO MEAS - EDV(TEICH): 84.9 ML
BH CV ECHO MEAS - EF(CUBED): 92.3 %
BH CV ECHO MEAS - EF(MOD-BP): 68 %
BH CV ECHO MEAS - EF(MOD-SP4): 67.6 %
BH CV ECHO MEAS - EF(TEICH): 87.8 %
BH CV ECHO MEAS - ESV(MOD-SP4): 31.9 ML
BH CV ECHO MEAS - ESV(TEICH): 10.4 ML
BH CV ECHO MEAS - IVS/LVPW: 1.1
BH CV ECHO MEAS - IVSD: 1 CM
BH CV ECHO MEAS - LAT PEAK E' VEL: 9 CM/SEC
BH CV ECHO MEAS - LV MASS(C)D: 144.6 GRAMS
BH CV ECHO MEAS - LV MAX PG: 8.6 MMHG
BH CV ECHO MEAS - LV MEAN PG: 5 MMHG
BH CV ECHO MEAS - LV V1 MAX: 147 CM/SEC
BH CV ECHO MEAS - LV V1 MEAN: 105 CM/SEC
BH CV ECHO MEAS - LV V1 VTI: 40.6 CM
BH CV ECHO MEAS - LVIDD: 4.3 CM
BH CV ECHO MEAS - LVIDS: 1.8 CM
BH CV ECHO MEAS - LVLD AP4: 7.9 CM
BH CV ECHO MEAS - LVLS AP4: 6.5 CM
BH CV ECHO MEAS - LVOT AREA (M): 2 CM^2
BH CV ECHO MEAS - LVOT AREA: 2 CM^2
BH CV ECHO MEAS - LVOT DIAM: 1.6 CM
BH CV ECHO MEAS - LVPWD: 0.96 CM
BH CV ECHO MEAS - LVPWS: 4.4 CM
BH CV ECHO MEAS - MED PEAK E' VEL: 6 CM/SEC
BH CV ECHO MEAS - MV A MAX VEL: 116 CM/SEC
BH CV ECHO MEAS - MV DEC SLOPE: 496 CM/SEC^2
BH CV ECHO MEAS - MV DEC TIME: 0.33 SEC
BH CV ECHO MEAS - MV E MAX VEL: 127 CM/SEC
BH CV ECHO MEAS - MV E/A: 1.1
BH CV ECHO MEAS - MV MAX PG: 7.4 MMHG
BH CV ECHO MEAS - MV MEAN PG: 2 MMHG
BH CV ECHO MEAS - MV P1/2T MAX VEL: 136 CM/SEC
BH CV ECHO MEAS - MV P1/2T: 80.3 MSEC
BH CV ECHO MEAS - MV V2 MAX: 136 CM/SEC
BH CV ECHO MEAS - MV V2 MEAN: 67.1 CM/SEC
BH CV ECHO MEAS - MV V2 VTI: 52.2 CM
BH CV ECHO MEAS - MVA P1/2T LCG: 1.6 CM^2
BH CV ECHO MEAS - MVA(P1/2T): 2.7 CM^2
BH CV ECHO MEAS - MVA(VTI): 1.6 CM^2
BH CV ECHO MEAS - PA MAX PG (FULL): 5.5 MMHG
BH CV ECHO MEAS - PA MAX PG: 7.4 MMHG
BH CV ECHO MEAS - PA V2 MAX: 136 CM/SEC
BH CV ECHO MEAS - PULM A REVS DUR: 0.16 SEC
BH CV ECHO MEAS - PULM A REVS VEL: 45.7 CM/SEC
BH CV ECHO MEAS - PULM DIAS VEL: 48.6 CM/SEC
BH CV ECHO MEAS - PULM S/D: 1.2
BH CV ECHO MEAS - PULM SYS VEL: 58.3 CM/SEC
BH CV ECHO MEAS - PVA(V,A): 1.1 CM^2
BH CV ECHO MEAS - PVA(V,D): 1.1 CM^2
BH CV ECHO MEAS - QP/QS: 0.48
BH CV ECHO MEAS - RAP SYSTOLE: 15 MMHG
BH CV ECHO MEAS - RV MAX PG: 1.9 MMHG
BH CV ECHO MEAS - RV MEAN PG: 1 MMHG
BH CV ECHO MEAS - RV V1 MAX: 68.7 CM/SEC
BH CV ECHO MEAS - RV V1 MEAN: 47.5 CM/SEC
BH CV ECHO MEAS - RV V1 VTI: 17.1 CM
BH CV ECHO MEAS - RVOT AREA: 2.3 CM^2
BH CV ECHO MEAS - RVOT DIAM: 1.7 CM
BH CV ECHO MEAS - RVSP: 32 MMHG
BH CV ECHO MEAS - SUP REN AO DIAM: 1.9 CM
BH CV ECHO MEAS - SV(AO): 283.8 ML
BH CV ECHO MEAS - SV(CUBED): 75.5 ML
BH CV ECHO MEAS - SV(LVOT): 81.6 ML
BH CV ECHO MEAS - SV(MOD-SP4): 66.5 ML
BH CV ECHO MEAS - SV(RVOT): 38.8 ML
BH CV ECHO MEAS - SV(TEICH): 74.5 ML
BH CV ECHO MEAS - TAPSE (>1.6): 1.6 CM2
BH CV ECHO MEAS - TR MAX VEL: 208 CM/SEC
BH CV ECHO MEASUREMENTS AVERAGE E/E' RATIO: 16.93
BH CV XLRA - TDI S': 9 CM/SEC
LEFT ATRIUM VOLUME INDEX: 28 ML/M2
SINUS: 2.4 CM
STJ: 2.6 CM

## 2019-03-06 PROCEDURE — 93306 TTE W/DOPPLER COMPLETE: CPT

## 2019-03-06 PROCEDURE — 93306 TTE W/DOPPLER COMPLETE: CPT | Performed by: INTERNAL MEDICINE

## 2019-03-11 ENCOUNTER — OFFICE VISIT (OUTPATIENT)
Dept: INTERNAL MEDICINE | Facility: CLINIC | Age: 79
End: 2019-03-11

## 2019-03-11 VITALS
WEIGHT: 140 LBS | BODY MASS INDEX: 26.43 KG/M2 | SYSTOLIC BLOOD PRESSURE: 150 MMHG | DIASTOLIC BLOOD PRESSURE: 80 MMHG | HEIGHT: 61 IN

## 2019-03-11 DIAGNOSIS — I10 ESSENTIAL HYPERTENSION: Primary | ICD-10-CM

## 2019-03-11 DIAGNOSIS — R73.01 ELEVATED FASTING GLUCOSE: ICD-10-CM

## 2019-03-11 DIAGNOSIS — E03.9 HYPOTHYROIDISM, ADULT: ICD-10-CM

## 2019-03-11 DIAGNOSIS — E78.5 HYPERLIPIDEMIA, UNSPECIFIED HYPERLIPIDEMIA TYPE: ICD-10-CM

## 2019-03-11 PROCEDURE — 99214 OFFICE O/P EST MOD 30 MIN: CPT | Performed by: INTERNAL MEDICINE

## 2019-03-11 RX ORDER — LOSARTAN POTASSIUM AND HYDROCHLOROTHIAZIDE 25; 100 MG/1; MG/1
1 TABLET ORAL DAILY
Qty: 90 TABLET | Refills: 3 | Status: SHIPPED | OUTPATIENT
Start: 2019-03-11 | End: 2020-02-03

## 2019-03-11 NOTE — PROGRESS NOTES
Chief Complaint   Patient presents with   • Hypothyroidism     4 month follow up   • Hypertension   • Hyperlipidemia       Subjective   Dory Hampton is a 78 y.o. female.     Hypothyroidism   This is a chronic problem. Pertinent negatives include no chest pain, coughing or headaches. Associated symptoms comments: She currently takes levothyroxine.  She has not had any symptoms of constipation, no change in the way she tolerates heat and cold..   Hypertension   This is a chronic problem. The problem is controlled. Pertinent negatives include no blurred vision, chest pain, headaches, orthopnea, palpitations, peripheral edema, PND or shortness of breath. Past treatments include diuretics. Current antihypertension treatment includes angiotensin blockers, lifestyle changes and beta blockers. The current treatment provides moderate improvement. There are no compliance problems.  There is no history of CAD/MI or CVA.   Hyperlipidemia   This is a chronic problem. Recent lipid tests were reviewed and are variable. Exacerbating diseases include hypothyroidism. She has no history of diabetes or obesity. Factors aggravating her hyperlipidemia include beta blockers. Pertinent negatives include no chest pain or shortness of breath. Current antihyperlipidemic treatment includes diet change.      Elevated fasting glucose:  This is a chronic problem.  She denies polyuria, polydipsia, vision change appetite change, unexplained weight loss.   Lab Results   Component Value Date    HGBA1C 4.30 (L) 12/15/2016        The following portions of the patient's history were reviewed and updated as appropriate: allergies, current medications, past family history, past medical history, past social history, past surgical history and problem list.    Review of Systems   Constitutional: Negative for appetite change.   HENT: Negative for nosebleeds.    Eyes: Negative for blurred vision and double vision.   Respiratory: Negative for cough and  "shortness of breath.    Cardiovascular: Negative for chest pain, palpitations, orthopnea, leg swelling and PND.         Current Outpatient Medications:   •  aspirin 81 MG EC tablet, Take 1 tablet by mouth Daily., Disp: , Rfl:   •  Cyanocobalamin (VITAMIN B-12 PO), Take 1,000 mcg by mouth daily., Disp: , Rfl:   •  metoprolol tartrate (LOPRESSOR) 50 MG tablet, TAKE 1 TABLET BY MOUTH TWICE DAILY, Disp: 180 tablet, Rfl: 1  •  Multiple Vitamins-Minerals (CENTRUM SILVER PO), Take 1 tablet by mouth Daily. PT HOLDING FOR SURGERY, Disp: , Rfl:   •  Pyridoxine HCl (VITAMIN B6 PO), Take 100 mg by mouth daily., Disp: , Rfl:   •  SYNTHROID 88 MCG tablet, TAKE 1 TABLET DAILY, Disp: 90 tablet, Rfl: 3  •  losartan-hydrochlorothiazide (HYZAAR) 100-25 MG per tablet, Take 1 tablet by mouth Daily., Disp: 90 tablet, Rfl: 3        Objective     /80   Ht 154.9 cm (61\")   Wt 63.5 kg (140 lb)   LMP  (LMP Unknown)   BMI 26.45 kg/m²     Physical Exam   Constitutional: She is oriented to person, place, and time. She appears well-developed and well-nourished. No distress.   Neck: Normal carotid pulses present. Carotid bruit is not present.   Cardiovascular: Regular rhythm, S1 normal and S2 normal. Exam reveals no gallop and no friction rub.   Murmur heard.  Pulses:       Carotid pulses are 2+ on the right side, and 2+ on the left side.  Pulmonary/Chest: Effort normal and breath sounds normal. She has no wheezes. She has no rhonchi. She has no rales. Chest wall is not dull to percussion.   Musculoskeletal: She exhibits no edema.   Neurological: She is alert and oriented to person, place, and time.   Skin: Skin is warm and dry.   Nursing note and vitals reviewed.        Assessment/Plan   Dory was seen today for hypothyroidism, hypertension and hyperlipidemia.    Diagnoses and all orders for this visit:    Essential hypertension  -     Comprehensive Metabolic Panel; Future  -     Lipid Panel; Future    Hyperlipidemia, unspecified " hyperlipidemia type    Hypothyroidism, adult    Elevated fasting glucose  -     Hemoglobin A1c; Future    Other orders  -     losartan-hydrochlorothiazide (HYZAAR) 100-25 MG per tablet; Take 1 tablet by mouth Daily.

## 2019-03-18 ENCOUNTER — LAB (OUTPATIENT)
Dept: INTERNAL MEDICINE | Facility: CLINIC | Age: 79
End: 2019-03-18

## 2019-03-18 DIAGNOSIS — R73.01 ELEVATED FASTING GLUCOSE: ICD-10-CM

## 2019-03-18 DIAGNOSIS — I10 ESSENTIAL HYPERTENSION: ICD-10-CM

## 2019-03-18 LAB — HBA1C MFR BLD: 5.1 % (ref 4.8–5.6)

## 2019-03-18 PROCEDURE — 36415 COLL VENOUS BLD VENIPUNCTURE: CPT | Performed by: INTERNAL MEDICINE

## 2019-03-19 LAB
ALBUMIN SERPL-MCNC: 4.6 G/DL (ref 3.5–5.2)
ALBUMIN/GLOB SERPL: 2.4 G/DL
ALP SERPL-CCNC: 66 U/L (ref 39–117)
ALT SERPL-CCNC: 11 U/L (ref 1–33)
AST SERPL-CCNC: 18 U/L (ref 1–32)
BILIRUB SERPL-MCNC: 0.5 MG/DL (ref 0.2–1.2)
BUN SERPL-MCNC: 25 MG/DL (ref 8–23)
BUN/CREAT SERPL: 24.3 (ref 7–25)
CALCIUM SERPL-MCNC: 9.5 MG/DL (ref 8.6–10.5)
CHLORIDE SERPL-SCNC: 104 MMOL/L (ref 98–107)
CHOLEST SERPL-MCNC: 191 MG/DL (ref 0–200)
CO2 SERPL-SCNC: 28 MMOL/L (ref 22–29)
CREAT SERPL-MCNC: 1.03 MG/DL (ref 0.57–1)
GLOBULIN SER CALC-MCNC: 1.9 GM/DL
GLUCOSE SERPL-MCNC: 108 MG/DL (ref 65–99)
HDLC SERPL-MCNC: 72 MG/DL (ref 40–60)
LDLC SERPL CALC-MCNC: 106 MG/DL (ref 0–100)
POTASSIUM SERPL-SCNC: 4.6 MMOL/L (ref 3.5–5.2)
PROT SERPL-MCNC: 6.5 G/DL (ref 6–8.5)
SODIUM SERPL-SCNC: 143 MMOL/L (ref 136–145)
TRIGL SERPL-MCNC: 64 MG/DL (ref 0–150)
VLDLC SERPL-MCNC: 12.8 MG/DL (ref 5–40)

## 2019-03-20 ENCOUNTER — OFFICE VISIT (OUTPATIENT)
Dept: ORTHOPEDIC SURGERY | Facility: CLINIC | Age: 79
End: 2019-03-20

## 2019-03-20 VITALS — HEIGHT: 61 IN | WEIGHT: 142 LBS | TEMPERATURE: 98.2 F | BODY MASS INDEX: 26.81 KG/M2

## 2019-03-20 DIAGNOSIS — M41.9 ACQUIRED SCOLIOSIS: Primary | ICD-10-CM

## 2019-03-20 DIAGNOSIS — M43.16 SPONDYLOLISTHESIS OF LUMBAR REGION: ICD-10-CM

## 2019-03-20 PROCEDURE — 99203 OFFICE O/P NEW LOW 30 MIN: CPT | Performed by: ORTHOPAEDIC SURGERY

## 2019-03-20 NOTE — PROGRESS NOTES
New patient or new problem visit    Chief Complaint   Patient presents with   • Lumbar Spine - Establish Care, Pain       HPI: He complains of intermittent mild to moderate back pain with some radiation to the right leg mostly back pain started after twisting to  a suitcase last week is been ongoing years but worse in the last week is much better now after ice pack and heat.  Occasional leg pain but mostly back pain    PFSH: See chart- reviewed    Review of Systems   HENT: Positive for postnasal drip.    Musculoskeletal: Positive for arthralgias and joint swelling.   All other systems reviewed and are negative.      PE: Constitutional: Vital signs above-noted.  Awake, alert and oriented    Psychiatric: Affect and insight do not appear grossly disturbed.    Pulmonary: Breathing is unlabored, color is good.    Skin: Warm, dry and normal turgor    Cardiac: Pedal pulses intact.  No edema.    Eyesight and hearing appear adequate for examination purposes      Musculoskeletal:  There is some tenderness to percussion and palpation of the spine. Motion appears undisturbed.  Posture is unremarkable to coronal and sagittal inspection.    The skin about the area is intact.  There is no palpable or visible deformity.  There is no local spasm.       Neurologic:   Reflexes are 2+ and symmetrical in the patellae and achilles.   Motor function is undisturbed in quadriceps, EHL, and gastrocnemius   sensation appears symmetrically intact to light touch.  In the bilateral lower extremities there is no evidence of atrophy.   Clonus is absent..  Gait appears undisturbed.  SLR test negative      MEDICAL DECISION MAKING    XRAY: Report of plain film x-rays obtained recently Dr. Billy's office show degenerative changes and spondylolisthesis at 3 4 and 4 5 and slight scoliosis.  I did not have the images to look at.    Other: n/a    Impression: Lumbar disc degeneration lumbar spondylolisthesis lumbar scoliosis improving with intact  neurologic function    Plan: For now we will watch it if she fails to improve physical therapy and after that MRI scan.  Follow-up as needed

## 2019-04-29 ENCOUNTER — TELEPHONE (OUTPATIENT)
Dept: INTERNAL MEDICINE | Facility: CLINIC | Age: 79
End: 2019-04-29

## 2019-04-29 NOTE — TELEPHONE ENCOUNTER
----- Message from Trinity Hidalgo sent at 4/29/2019  3:30 PM EDT -----  Contact: pt  Pt is calling regarding her BP medication.  Would like to take losartan without HTZ     Would like a call.  Pt# [phone]

## 2019-04-29 NOTE — TELEPHONE ENCOUNTER
Discussed with her.  She has been taking losartan-hydrochlorothiazide in the evening.  She has been getting up to urinate during the night.  Recommended she take it in the morning.

## 2019-04-30 DIAGNOSIS — E03.9 HYPOTHYROIDISM, ADULT: Primary | ICD-10-CM

## 2019-04-30 RX ORDER — LEVOTHYROXINE SODIUM 88 MCG
88 TABLET ORAL DAILY
Qty: 90 TABLET | Refills: 2 | Status: SHIPPED | OUTPATIENT
Start: 2019-04-30 | End: 2019-07-31

## 2019-07-30 ENCOUNTER — OFFICE VISIT (OUTPATIENT)
Dept: INTERNAL MEDICINE | Facility: CLINIC | Age: 79
End: 2019-07-30

## 2019-07-30 VITALS
BODY MASS INDEX: 23.41 KG/M2 | HEIGHT: 61 IN | DIASTOLIC BLOOD PRESSURE: 64 MMHG | WEIGHT: 124 LBS | SYSTOLIC BLOOD PRESSURE: 126 MMHG

## 2019-07-30 DIAGNOSIS — E03.9 HYPOTHYROIDISM, ADULT: ICD-10-CM

## 2019-07-30 DIAGNOSIS — I10 ESSENTIAL HYPERTENSION: Primary | ICD-10-CM

## 2019-07-30 DIAGNOSIS — E78.5 HYPERLIPIDEMIA, UNSPECIFIED HYPERLIPIDEMIA TYPE: ICD-10-CM

## 2019-07-30 DIAGNOSIS — R55 SYNCOPE, UNSPECIFIED SYNCOPE TYPE: ICD-10-CM

## 2019-07-30 DIAGNOSIS — R41.89 COGNITIVE CHANGE: ICD-10-CM

## 2019-07-30 LAB
ALBUMIN SERPL-MCNC: 4.3 G/DL (ref 3.5–5.2)
ALBUMIN/GLOB SERPL: 1.4 G/DL
ALP SERPL-CCNC: 51 U/L (ref 39–117)
ALT SERPL W P-5'-P-CCNC: 7 U/L (ref 1–33)
ANION GAP SERPL CALCULATED.3IONS-SCNC: 12.7 MMOL/L (ref 5–15)
AST SERPL-CCNC: 24 U/L (ref 1–32)
BILIRUB SERPL-MCNC: 0.9 MG/DL (ref 0.2–1.2)
BUN BLD-MCNC: 21 MG/DL (ref 8–23)
BUN/CREAT SERPL: 15.1 (ref 7–25)
CALCIUM SPEC-SCNC: 10.1 MG/DL (ref 8.6–10.5)
CHLORIDE SERPL-SCNC: 101 MMOL/L (ref 98–107)
CHOLEST SERPL-MCNC: 182 MG/DL (ref 0–200)
CO2 SERPL-SCNC: 30.3 MMOL/L (ref 22–29)
CREAT BLD-MCNC: 1.39 MG/DL (ref 0.57–1)
DEPRECATED RDW RBC AUTO: 53.5 FL (ref 37–54)
ERYTHROCYTE [DISTWIDTH] IN BLOOD BY AUTOMATED COUNT: 15 % (ref 12.3–15.4)
GFR SERPL CREATININE-BSD FRML MDRD: 37 ML/MIN/1.73
GLOBULIN UR ELPH-MCNC: 3 GM/DL
GLUCOSE BLD-MCNC: 104 MG/DL (ref 65–99)
HCT VFR BLD AUTO: 33 % (ref 34–46.6)
HDLC SERPL-MCNC: 63 MG/DL (ref 40–60)
HGB BLD-MCNC: 11.3 G/DL (ref 12–15.9)
LDLC SERPL CALC-MCNC: 101 MG/DL (ref 0–100)
LDLC/HDLC SERPL: 1.61 {RATIO}
MCH RBC QN AUTO: 34.3 PG (ref 26.6–33)
MCHC RBC AUTO-ENTMCNC: 34.2 G/DL (ref 31.5–35.7)
MCV RBC AUTO: 100.3 FL (ref 79–97)
PLATELET # BLD AUTO: 225 10*3/MM3 (ref 140–450)
PMV BLD AUTO: 10.4 FL (ref 6–12)
POTASSIUM BLD-SCNC: 4 MMOL/L (ref 3.5–5.2)
PROT SERPL-MCNC: 7.3 G/DL (ref 6–8.5)
RBC # BLD AUTO: 3.29 10*6/MM3 (ref 3.77–5.28)
SODIUM BLD-SCNC: 144 MMOL/L (ref 136–145)
TRIGL SERPL-MCNC: 89 MG/DL (ref 0–150)
VLDLC SERPL-MCNC: 17.8 MG/DL (ref 5–40)
WBC NRBC COR # BLD: 6.45 10*3/MM3 (ref 3.4–10.8)

## 2019-07-30 PROCEDURE — 80061 LIPID PANEL: CPT | Performed by: INTERNAL MEDICINE

## 2019-07-30 PROCEDURE — 80053 COMPREHEN METABOLIC PANEL: CPT | Performed by: INTERNAL MEDICINE

## 2019-07-30 PROCEDURE — 99214 OFFICE O/P EST MOD 30 MIN: CPT | Performed by: INTERNAL MEDICINE

## 2019-07-30 PROCEDURE — 85027 COMPLETE CBC AUTOMATED: CPT | Performed by: INTERNAL MEDICINE

## 2019-07-30 RX ORDER — DONEPEZIL HYDROCHLORIDE 5 MG/1
5 TABLET, FILM COATED ORAL NIGHTLY
Qty: 90 TABLET | Refills: 1 | Status: SHIPPED | OUTPATIENT
Start: 2019-07-30 | End: 2020-01-09

## 2019-07-30 NOTE — PROGRESS NOTES
Chief Complaint   Patient presents with   • Hypothyroidism     4 month follow up   • Hypertension   • Hyperlipidemia       Subjective   Dory Hampton is a 78 y.o. female.     She has been losing weight.  She reports loss of appetite associated with plans to move.  She has noticed some memory problems.  She sometimes forgets people's names.  She reports an episode of syncope.  This happened about a month ago.  She was walking, felt dizzy and fainted.  She says that she woke up on the floor.  She does not recall any injury.  She did not seek medical attention at the time.             Hypothyroidism   This is a chronic problem. The problem has been unchanged (Controlled). Pertinent negatives include no abdominal pain, chest pain, chills, coughing, diaphoresis, fever, headaches or myalgias. Associated symptoms comments: No changes in heat/cold tolerance, no change hair/skin.  No fatigue.  . Treatments tried: Synthroid. The treatment provided significant relief.   Hypertension   This is a chronic problem. The problem is unchanged. The problem is controlled. Associated symptoms include shortness of breath (if goes up steps). Pertinent negatives include no blurred vision, chest pain, headaches, orthopnea, palpitations, peripheral edema or PND. There are no associated agents to hypertension. Current antihypertension treatment includes diuretics, angiotensin blockers and beta blockers. The current treatment provides significant improvement. There are no compliance problems.    Hyperlipidemia   This is a chronic problem. The problem is controlled. Exacerbating diseases include hypothyroidism. She has no history of liver disease or obesity. Factors aggravating her hyperlipidemia include beta blockers and thiazides. Associated symptoms include shortness of breath (if goes up steps). Pertinent negatives include no chest pain, leg pain or myalgias. Current antihyperlipidemic treatment includes diet change. The current treatment  "provides significant improvement of lipids. There are no compliance problems.         The following portions of the patient's history were reviewed and updated as appropriate: allergies, current medications, past family history, past medical history, past social history, past surgical history and problem list.    Review of Systems   Constitutional: Negative for appetite change, chills, diaphoresis and fever.   HENT: Negative for nosebleeds.    Eyes: Negative for blurred vision and double vision.   Respiratory: Positive for shortness of breath (if goes up steps). Negative for cough.    Cardiovascular: Negative for chest pain, palpitations, orthopnea, leg swelling and PND.   Gastrointestinal: Positive for constipation. Negative for abdominal pain and diarrhea.   Musculoskeletal: Negative for myalgias.   Neurological: Positive for dizziness and syncope (one month ago).         Current Outpatient Medications:   •  aspirin 81 MG EC tablet, Take 1 tablet by mouth Daily., Disp: , Rfl:   •  Cyanocobalamin (VITAMIN B-12 PO), Take 1,000 mcg by mouth daily., Disp: , Rfl:   •  losartan-hydrochlorothiazide (HYZAAR) 100-25 MG per tablet, Take 1 tablet by mouth Daily., Disp: 90 tablet, Rfl: 3  •  metoprolol tartrate (LOPRESSOR) 50 MG tablet, TAKE 1 TABLET BY MOUTH TWICE DAILY, Disp: 180 tablet, Rfl: 1  •  Multiple Vitamins-Minerals (CENTRUM SILVER PO), Take 1 tablet by mouth Daily. PT HOLDING FOR SURGERY, Disp: , Rfl:   •  Pyridoxine HCl (VITAMIN B6 PO), Take 100 mg by mouth daily., Disp: , Rfl:   •  SYNTHROID 88 MCG tablet, Take 1 tablet by mouth Daily., Disp: 90 tablet, Rfl: 2  •  donepezil (ARICEPT) 5 MG tablet, Take 1 tablet by mouth Every Night., Disp: 90 tablet, Rfl: 1        Objective     /64   Ht 154.9 cm (61\")   Wt 56.2 kg (124 lb)   LMP  (LMP Unknown)   BMI 23.43 kg/m²     Physical Exam   Constitutional: She is oriented to person, place, and time. She appears well-developed and well-nourished. No distress. "   Neck: Normal carotid pulses present. Carotid bruit is not present.   Cardiovascular: Regular rhythm, S1 normal and S2 normal. Exam reveals no gallop and no friction rub.   No murmur heard.  Pulses:       Carotid pulses are 2+ on the right side, and 2+ on the left side.  Pulmonary/Chest: Effort normal and breath sounds normal. She has no wheezes. She has no rhonchi. She has no rales. Chest wall is not dull to percussion.   Musculoskeletal: She exhibits no edema.   Neurological: She is alert and oriented to person, place, and time.   Skin: Skin is warm and dry.   Psychiatric: Cognition and memory are impaired.   On Mini-Mental status exam, she could not do serial sevens or spell world backwards.  She was only able to remember 1 out of 3 objects.  She was unable to accurately draw intersecting pentagons.  She was able to draw clock, setting the time to 11:10.  For the date, she said it was July 31, 2018.   Nursing note and vitals reviewed.        Assessment/Plan   Dory was seen today for hypothyroidism, hypertension and hyperlipidemia.    Diagnoses and all orders for this visit:    Essential hypertension  -     Comprehensive Metabolic Panel; Future  -     Lipid Panel; Future  -     CBC (No Diff); Future    Hyperlipidemia, unspecified hyperlipidemia type    Hypothyroidism, adult  -     TSH Rfx On Abnormal To Free T4; Future    Cognitive change  -     Vitamin B12 & Folate; Future    Syncope, unspecified syncope type  -     Holter Monitor - 24 Hour  -     Duplex Carotid Ultrasound CAR; Future    Other orders  -     donepezil (ARICEPT) 5 MG tablet; Take 1 tablet by mouth Every Night.      Trial of donepezil for cognitive change.  Further evaluate syncope with Holter monitor and carotid Doppler.  She has recently had a cardiac echo.  Blood pressure is well controlled.

## 2019-07-31 LAB
FOLATE SERPL-MCNC: >20 NG/ML (ref 4.78–24.2)
T4 FREE SERPL-MCNC: 2.4 NG/DL (ref 0.93–1.7)
TSH SERPL-ACNC: 0.07 MIU/ML (ref 0.27–4.2)
VIT B12 SERPL-MCNC: 1254 PG/ML (ref 211–946)

## 2019-07-31 RX ORDER — LEVOTHYROXINE SODIUM 50 MCG
50 TABLET ORAL DAILY
Qty: 90 TABLET | Refills: 1 | Status: SHIPPED | OUTPATIENT
Start: 2019-07-31 | End: 2020-01-09

## 2019-08-09 ENCOUNTER — TELEPHONE (OUTPATIENT)
Dept: INTERNAL MEDICINE | Facility: CLINIC | Age: 79
End: 2019-08-09

## 2019-08-12 ENCOUNTER — HOSPITAL ENCOUNTER (OUTPATIENT)
Dept: CARDIOLOGY | Facility: HOSPITAL | Age: 79
Discharge: HOME | End: 2019-08-12
Admitting: INTERNAL MEDICINE

## 2019-08-12 ENCOUNTER — HOSPITAL ENCOUNTER (OUTPATIENT)
Dept: CARDIOLOGY | Facility: HOSPITAL | Age: 79
Discharge: HOME | End: 2019-08-12

## 2019-08-12 DIAGNOSIS — R55 SYNCOPE, UNSPECIFIED SYNCOPE TYPE: ICD-10-CM

## 2019-08-12 LAB
BH CV XLRA MEAS LEFT DIST CCA EDV: 15.7 CM/SEC
BH CV XLRA MEAS LEFT DIST CCA PSV: 75 CM/SEC
BH CV XLRA MEAS LEFT DIST ICA EDV: -27.5 CM/SEC
BH CV XLRA MEAS LEFT DIST ICA PSV: -101 CM/SEC
BH CV XLRA MEAS LEFT ICA/CCA RATIO: 1.7
BH CV XLRA MEAS LEFT MID ICA EDV: -23.8 CM/SEC
BH CV XLRA MEAS LEFT MID ICA PSV: -109 CM/SEC
BH CV XLRA MEAS LEFT PROX CCA EDV: 15.2 CM/SEC
BH CV XLRA MEAS LEFT PROX CCA PSV: 89.1 CM/SEC
BH CV XLRA MEAS LEFT PROX ECA PSV: -87.4 CM/SEC
BH CV XLRA MEAS LEFT PROX ICA EDV: -29.9 CM/SEC
BH CV XLRA MEAS LEFT PROX ICA PSV: -129 CM/SEC
BH CV XLRA MEAS LEFT PROX SCLA PSV: 175 CM/SEC
BH CV XLRA MEAS LEFT VERTEBRAL A EDV: 6.9 CM/SEC
BH CV XLRA MEAS LEFT VERTEBRAL A PSV: 37.6 CM/SEC
BH CV XLRA MEAS RIGHT DIST CCA EDV: 13.4 CM/SEC
BH CV XLRA MEAS RIGHT DIST CCA PSV: 77.8 CM/SEC
BH CV XLRA MEAS RIGHT DIST ICA EDV: -15.2 CM/SEC
BH CV XLRA MEAS RIGHT DIST ICA PSV: -95 CM/SEC
BH CV XLRA MEAS RIGHT ICA/CCA RATIO: 1.2
BH CV XLRA MEAS RIGHT MID ICA EDV: -19.9 CM/SEC
BH CV XLRA MEAS RIGHT MID ICA PSV: -99.7 CM/SEC
BH CV XLRA MEAS RIGHT PROX CCA EDV: 84 CM/SEC
BH CV XLRA MEAS RIGHT PROX CCA PSV: 71 CM/SEC
BH CV XLRA MEAS RIGHT PROX ECA EDV: -8.6 CM/SEC
BH CV XLRA MEAS RIGHT PROX ECA PSV: -118 CM/SEC
BH CV XLRA MEAS RIGHT PROX ICA EDV: -17.7 CM/SEC
BH CV XLRA MEAS RIGHT PROX ICA PSV: -71.5 CM/SEC
BH CV XLRA MEAS RIGHT PROX SCLA EDV: 7 CM/SEC
BH CV XLRA MEAS RIGHT PROX SCLA PSV: 99 CM/SEC
BH CV XLRA MEAS RIGHT VERTEBRAL A EDV: 9.4 CM/SEC
BH CV XLRA MEAS RIGHT VERTEBRAL A PSV: 60.1 CM/SEC
LEFT ARM BP: NORMAL MMHG
RIGHT ARM BP: NORMAL MMHG

## 2019-08-12 PROCEDURE — 93226 XTRNL ECG REC<48 HR SCAN A/R: CPT

## 2019-08-12 PROCEDURE — 93225 XTRNL ECG REC<48 HRS REC: CPT

## 2019-08-12 PROCEDURE — 93880 EXTRACRANIAL BILAT STUDY: CPT

## 2019-08-15 PROCEDURE — 93227 XTRNL ECG REC<48 HR R&I: CPT | Performed by: INTERNAL MEDICINE

## 2019-09-11 ENCOUNTER — TELEPHONE (OUTPATIENT)
Dept: INTERNAL MEDICINE | Facility: CLINIC | Age: 79
End: 2019-09-11

## 2019-09-11 NOTE — TELEPHONE ENCOUNTER
Pt calling Re:runny nose.  She would like to know what you recommend as she said it is running constantly.    She does not take antihistamine and has not taken Mucinex in a while.  Please advise.     [phone]

## 2019-09-26 RX ORDER — METOPROLOL TARTRATE 50 MG/1
TABLET, FILM COATED ORAL
Qty: 180 TABLET | Refills: 0 | Status: SHIPPED | OUTPATIENT
Start: 2019-09-26 | End: 2020-05-06

## 2019-10-11 RX ORDER — METOPROLOL TARTRATE 50 MG/1
TABLET, FILM COATED ORAL
Qty: 180 TABLET | Refills: 0 | OUTPATIENT
Start: 2019-10-11

## 2019-10-17 ENCOUNTER — TELEPHONE (OUTPATIENT)
Dept: INTERNAL MEDICINE | Facility: CLINIC | Age: 79
End: 2019-10-17

## 2019-10-17 NOTE — TELEPHONE ENCOUNTER
----- Message from Trinity Hidalgo sent at 10/16/2019  3:50 PM EDT -----  Contact: pt  Pt calling to report weight, she is 111 today July 30th she was 124.  Didn't know if this was normal.    Pt# [phone]

## 2019-10-17 NOTE — TELEPHONE ENCOUNTER
I think the weight loss is related to the thyroid.  We will have to see if she is able to maintain her weight once she is on the lower dose of thyroid medication.

## 2019-10-17 NOTE — TELEPHONE ENCOUNTER
Not trying to lose weight, nothing has changed with diet exercise. Is just a little concerned. Actually does not have much appetite either. Is going to be taking  to hospital so will not be home.

## 2019-12-02 ENCOUNTER — OFFICE VISIT (OUTPATIENT)
Dept: INTERNAL MEDICINE | Facility: CLINIC | Age: 79
End: 2019-12-02

## 2019-12-02 VITALS
HEART RATE: 50 BPM | BODY MASS INDEX: 21.73 KG/M2 | DIASTOLIC BLOOD PRESSURE: 64 MMHG | SYSTOLIC BLOOD PRESSURE: 118 MMHG | OXYGEN SATURATION: 98 % | WEIGHT: 115 LBS

## 2019-12-02 DIAGNOSIS — I10 ESSENTIAL HYPERTENSION: Primary | ICD-10-CM

## 2019-12-02 DIAGNOSIS — R41.89 COGNITIVE CHANGE: ICD-10-CM

## 2019-12-02 DIAGNOSIS — E78.5 HYPERLIPIDEMIA, UNSPECIFIED HYPERLIPIDEMIA TYPE: ICD-10-CM

## 2019-12-02 DIAGNOSIS — I48.0 PAROXYSMAL ATRIAL FIBRILLATION (HCC): ICD-10-CM

## 2019-12-02 DIAGNOSIS — E03.9 HYPOTHYROIDISM, ADULT: ICD-10-CM

## 2019-12-02 PROCEDURE — 99214 OFFICE O/P EST MOD 30 MIN: CPT | Performed by: INTERNAL MEDICINE

## 2019-12-02 RX ORDER — AZELASTINE 1 MG/ML
2 SPRAY, METERED NASAL 2 TIMES DAILY
Qty: 1 EACH | Refills: 5 | Status: SHIPPED | OUTPATIENT
Start: 2019-12-02 | End: 2021-05-11

## 2019-12-02 NOTE — PROGRESS NOTES
Chief Complaint   Patient presents with   • Hypertension   • Hypothyroidism   • Nasal Congestion     constant runny nose   • Hyperlipidemia       Subjective   Dory Hampton is a 79 y.o. female.     Hypertension   This is a chronic problem. Pertinent negatives include no blurred vision, chest pain, headaches, orthopnea, palpitations, peripheral edema, PND or shortness of breath. Current antihypertension treatment includes angiotensin blockers, diuretics, lifestyle changes and beta blockers. The current treatment provides significant improvement. There are no compliance problems.  There is no history of CAD/MI or CVA.   Hypothyroidism   This is a chronic problem. The problem has been unchanged. Pertinent negatives include no chest pain, coughing or headaches. Associated symptoms comments: No changes in heat/cold tolerance, no change hair/skin.  No fatigue.  She does not tolerate cold but there is been no change there.. Treatments tried: Levothyroxine. Improvement on treatment: Recent TSH was suppressed with elevated T4.  Synthroid dose was reduced.  In July.  However she did not start taking the reduced dose until 2 to 3 weeks ago.   Hyperlipidemia   This is a chronic problem. The problem is controlled. Recent lipid tests were reviewed and are variable. Exacerbating diseases include hypothyroidism. She has no history of diabetes or obesity. Factors aggravating her hyperlipidemia include beta blockers. Pertinent negatives include no chest pain or shortness of breath. Current antihyperlipidemic treatment includes diet change and exercise. The current treatment provides significant improvement of lipids.   Memory Loss   This is a chronic problem. The problem occurs daily. The problem has been gradually improving. Pertinent negatives include no chest pain, coughing or headaches. Nothing aggravates the symptoms. Treatments tried: Donepezil. The treatment provided significant (She and her family noticed improvement since  starting donepezil.) relief.   Runny nose: She complains of a constant runny nose.  Drainage is clear.  She has tried an over-the-counter antihistamine.  It has not helped.    The following portions of the patient's history were reviewed and updated as appropriate: allergies, current medications, past family history, past medical history, past social history, past surgical history and problem list.    Review of Systems   Constitutional: Negative for appetite change.   HENT: Positive for rhinorrhea. Negative for nosebleeds.    Eyes: Negative for blurred vision and double vision.   Respiratory: Negative for cough and shortness of breath.    Cardiovascular: Negative for chest pain, palpitations, orthopnea, leg swelling and PND.   Neurological: Positive for memory problem. Negative for headache.   Psychiatric/Behavioral: Negative for sleep disturbance (sometimes has trouble falling asleep).         Current Outpatient Medications:   •  aspirin 81 MG EC tablet, Take 1 tablet by mouth Daily., Disp: , Rfl:   •  azelastine (ASTELIN) 0.1 % nasal spray, 2 sprays into the nostril(s) as directed by provider 2 (Two) Times a Day. Use in each nostril as directed, Disp: 1 each, Rfl: 5  •  Cyanocobalamin (VITAMIN B-12 PO), Take 1,000 mcg by mouth daily., Disp: , Rfl:   •  donepezil (ARICEPT) 5 MG tablet, Take 1 tablet by mouth Every Night., Disp: 90 tablet, Rfl: 1  •  losartan-hydrochlorothiazide (HYZAAR) 100-25 MG per tablet, Take 1 tablet by mouth Daily., Disp: 90 tablet, Rfl: 3  •  metoprolol tartrate (LOPRESSOR) 50 MG tablet, TAKE 1 TABLET BY MOUTH TWICE DAILY, Disp: 180 tablet, Rfl: 0  •  Multiple Vitamins-Minerals (CENTRUM SILVER PO), Take 1 tablet by mouth Daily. PT HOLDING FOR SURGERY, Disp: , Rfl:   •  Pyridoxine HCl (VITAMIN B6 PO), Take 100 mg by mouth daily., Disp: , Rfl:   •  SYNTHROID 50 MCG tablet, Take 1 tablet by mouth Daily., Disp: 90 tablet, Rfl: 1        Objective     /64 (BP Location: Left arm, Patient  Position: Sitting, Cuff Size: Adult)   Pulse 50   Wt 52.2 kg (115 lb)   LMP  (LMP Unknown)   SpO2 98%   BMI 21.73 kg/m²     Physical Exam   Constitutional: She is oriented to person, place, and time. She appears well-developed and well-nourished. No distress.   Neck: Normal carotid pulses present. Carotid bruit is not present.   Cardiovascular: Regular rhythm, S1 normal and S2 normal. Exam reveals no gallop and no friction rub.   No murmur heard.  Pulses:       Carotid pulses are 2+ on the right side, and 2+ on the left side.  Pulmonary/Chest: Effort normal and breath sounds normal. She has no wheezes. She has no rhonchi. She has no rales. Chest wall is not dull to percussion.   Musculoskeletal: She exhibits no edema.   Neurological: She is alert and oriented to person, place, and time.   Skin: Skin is warm and dry.   Nursing note and vitals reviewed.        Assessment/Plan   Dory was seen today for hypertension, hypothyroidism, nasal congestion and hyperlipidemia.    Diagnoses and all orders for this visit:    Essential hypertension  -     Comprehensive Metabolic Panel; Future  -     CBC (No Diff); Future  -     Lipid Panel; Future    Hyperlipidemia, unspecified hyperlipidemia type    Hypothyroidism, adult  -     TSH Rfx On Abnormal To Free T4; Future    Other orders  -     azelastine (ASTELIN) 0.1 % nasal spray; 2 sprays into the nostril(s) as directed by provider 2 (Two) Times a Day. Use in each nostril as directed

## 2020-01-09 RX ORDER — LEVOTHYROXINE SODIUM 50 MCG
50 TABLET ORAL DAILY
Qty: 90 TABLET | Refills: 1 | Status: SHIPPED | OUTPATIENT
Start: 2020-01-09 | End: 2020-06-04

## 2020-01-09 RX ORDER — DONEPEZIL HYDROCHLORIDE 5 MG/1
5 TABLET, FILM COATED ORAL NIGHTLY
Qty: 90 TABLET | Refills: 1 | Status: SHIPPED | OUTPATIENT
Start: 2020-01-09 | End: 2020-06-29

## 2020-01-20 ENCOUNTER — LAB (OUTPATIENT)
Dept: INTERNAL MEDICINE | Facility: CLINIC | Age: 80
End: 2020-01-20

## 2020-01-20 DIAGNOSIS — I10 ESSENTIAL HYPERTENSION: ICD-10-CM

## 2020-01-20 DIAGNOSIS — E03.9 HYPOTHYROIDISM, ADULT: ICD-10-CM

## 2020-01-20 LAB
ERYTHROCYTE [DISTWIDTH] IN BLOOD BY AUTOMATED COUNT: 16.2 % (ref 12.3–15.4)
HCT VFR BLD AUTO: 33.2 % (ref 34–46.6)
HGB BLD-MCNC: 11.4 G/DL (ref 12–15.9)
MCH RBC QN AUTO: 34.4 PG (ref 26.6–33)
MCHC RBC AUTO-ENTMCNC: 34.3 G/DL (ref 31.5–35.7)
MCV RBC AUTO: 100.3 FL (ref 79–97)
PLATELET # BLD AUTO: 221 10*3/MM3 (ref 140–450)
RBC # BLD AUTO: 3.31 10*6/MM3 (ref 3.77–5.28)
TSH SERPL-ACNC: 2.3 UIU/ML (ref 0.27–4.2)
WBC # BLD AUTO: 7.27 10*3/MM3 (ref 3.4–10.8)

## 2020-01-21 LAB
ALBUMIN SERPL-MCNC: 4.6 G/DL (ref 3.5–5.2)
ALBUMIN/GLOB SERPL: 2.4 G/DL
ALP SERPL-CCNC: 60 U/L (ref 39–117)
ALT SERPL-CCNC: 8 U/L (ref 1–33)
AST SERPL-CCNC: 23 U/L (ref 1–32)
BILIRUB SERPL-MCNC: 0.9 MG/DL (ref 0.2–1.2)
BUN SERPL-MCNC: 20 MG/DL (ref 8–23)
BUN/CREAT SERPL: 20 (ref 7–25)
CALCIUM SERPL-MCNC: 9.3 MG/DL (ref 8.6–10.5)
CHLORIDE SERPL-SCNC: 103 MMOL/L (ref 98–107)
CHOLEST SERPL-MCNC: 239 MG/DL (ref 0–200)
CO2 SERPL-SCNC: 29 MMOL/L (ref 22–29)
CREAT SERPL-MCNC: 1 MG/DL (ref 0.57–1)
GLOBULIN SER CALC-MCNC: 1.9 GM/DL
GLUCOSE SERPL-MCNC: 91 MG/DL (ref 65–99)
HDLC SERPL-MCNC: 84 MG/DL (ref 40–60)
LDLC SERPL CALC-MCNC: 138 MG/DL (ref 0–100)
POTASSIUM SERPL-SCNC: 4.2 MMOL/L (ref 3.5–5.2)
PROT SERPL-MCNC: 6.5 G/DL (ref 6–8.5)
SODIUM SERPL-SCNC: 145 MMOL/L (ref 136–145)
TRIGL SERPL-MCNC: 84 MG/DL (ref 0–150)
VLDLC SERPL-MCNC: 16.8 MG/DL

## 2020-02-03 RX ORDER — LOSARTAN POTASSIUM AND HYDROCHLOROTHIAZIDE 25; 100 MG/1; MG/1
TABLET ORAL
Qty: 90 TABLET | Refills: 4 | Status: SHIPPED | OUTPATIENT
Start: 2020-02-03 | End: 2021-04-28

## 2020-03-02 ENCOUNTER — OFFICE VISIT (OUTPATIENT)
Dept: CARDIOLOGY | Facility: CLINIC | Age: 80
End: 2020-03-02

## 2020-03-02 VITALS
BODY MASS INDEX: 21.67 KG/M2 | HEIGHT: 61 IN | DIASTOLIC BLOOD PRESSURE: 80 MMHG | WEIGHT: 114.8 LBS | HEART RATE: 53 BPM | SYSTOLIC BLOOD PRESSURE: 140 MMHG

## 2020-03-02 DIAGNOSIS — I10 ESSENTIAL HYPERTENSION: ICD-10-CM

## 2020-03-02 DIAGNOSIS — I35.0 NONRHEUMATIC AORTIC VALVE STENOSIS: Primary | ICD-10-CM

## 2020-03-02 DIAGNOSIS — I48.91 POSTOPERATIVE ATRIAL FIBRILLATION (HCC): ICD-10-CM

## 2020-03-02 DIAGNOSIS — R00.1 SINUS BRADYCARDIA: ICD-10-CM

## 2020-03-02 DIAGNOSIS — R63.4 WEIGHT LOSS: ICD-10-CM

## 2020-03-02 DIAGNOSIS — R68.83 CHILLS: ICD-10-CM

## 2020-03-02 DIAGNOSIS — Z95.2 S/P AVR: ICD-10-CM

## 2020-03-02 DIAGNOSIS — I97.89 POSTOPERATIVE ATRIAL FIBRILLATION (HCC): ICD-10-CM

## 2020-03-02 DIAGNOSIS — I25.10 CORONARY ARTERY DISEASE INVOLVING NATIVE CORONARY ARTERY OF NATIVE HEART WITHOUT ANGINA PECTORIS: ICD-10-CM

## 2020-03-02 PROCEDURE — 93000 ELECTROCARDIOGRAM COMPLETE: CPT | Performed by: NURSE PRACTITIONER

## 2020-03-02 PROCEDURE — 99214 OFFICE O/P EST MOD 30 MIN: CPT | Performed by: NURSE PRACTITIONER

## 2020-03-02 NOTE — PROGRESS NOTES
Date of Office Visit: 2019  Encounter Provider: KEVIN Manjarrez  Place of Service: [facility] CARDIOLOGY  Patient Name: Dory Hampton  :1940      Chief Complaint   Patient presents with   • Follow-up   • Annual Exam   :     Dear Dr. Billy,     HPI: Dory Hampton is a pleasant 79 y.o. female who presents today for annual cardiac follow up. She has been diagnosed with hypertension, hyperlipidemia, and hypothyroidism. She quit smoking in .    She has a history of a heart murmur from severe aortic stenosis.  In 2016 she underwent a cardiac catheterization which revealed nonobstructive coronary artery disease and severe aortic valve stenosis.  On 16 she underwent mini sternotomy surgery and received a 21 mm magna pericardial prosthetic aortic valve replacement.  She developed postoperative atrial fibrillation, pleural effusions, and anemia.  The pleural effusion resolved with diuretic therapy and she was placed on warfarin for stroke prevention. In 2017, her amiodarone was discontinued and a follow-up Holter monitor showed no recurrence of atrial fibrillation so the warfarin was discontinued.    In 2019, she followed up with me in the office.  Her weight that day was 144 pounds.  She reported some mild dyspnea with exertion and I recommended a repeat echocardiogram.  The echo was completed 3/6/2019 with the following results: EF 68%, mild LVH, grade 2 diastolic dysfunction, septal wall motion abnormality from postoperative state, prosthetic valve was not well visualized but the peak and mean gradients were defined within normal limits.    Upon review of her records, Dr. Billy ordered a Holter monitor in 2019 which showed normal sinus rhythm, average heart rate 54, minimum 43 and maximum 77, and rare APCs and PVCs.  She also had a carotid duplex on 2019 which showed right carotid mild stenosis and left moderate stenosis.    She  presents today for annual follow-up visit with her son accompanying her.  Both have a chief concern that she is lost weight over the past year unintentional.  Her weight last year was 144 pounds in the office and using the same scale is 114 today.  She says she just does not have any appetite.  She reports occasional chills, but denies fevers.  Her nose constantly runs and she has a cough.  She denies chest pain, shortness of air, PND, orthopnea, edema, palpitations, or dizziness.  She had a syncopal episode in the summertime in which she was just standing in her kitchen and fell flat on her face.  She did not seek medical attention.  The syncopal episode was witnessed by her  and there was no reports of seizure activity or incontinence.  She has had no further syncope since then.    I reviewed her recent blood work from 1/20/2020: CMP normal, TSH normal, CBC showed hemoglobin of 11.4 and hematocrit 33.2, total cholesterol 239, triglycerides 84, HDL 84, and .    Past Medical History:   Diagnosis Date   • Abnormal electrocardiogram    • Anemia    • Aortic stenosis     severe; s/p repair   • Asthma     HISTORY   • Bradycardia    • Coronary artery disease    • Diastolic dysfunction     grade II   • Environmental allergies    • Female stress incontinence    • Hemolytic anemia, mechanical (CMS/HCC) 8/8/2016   • History of diverticulosis    • History of transfusion    • Hyperlipidemia    • Hypertension    • Hypokalemia    • Hypothyroid    • Iron deficiency anemia     hemolytic anemia   • Iron deficiency anemia due to chronic blood loss 8/8/2016   • Left ventricular hypertrophy    • Murmur    • PAF (paroxysmal atrial fibrillation) (CMS/HCC)     with RVR   • Pleural effusion     bilateral, post op   • Psoriasis    • Tendinitis    • Thoracic aorta atherosclerosis (CMS/HCC)        Past Surgical History:   Procedure Laterality Date   • AORTIC VALVE REPAIR/REPLACEMENT N/A 12/16/2016    Procedure: BHARGAVI MINI  STERNOTOMY AORTIC VALVE REPLACEMENT;  Surgeon: Robin Jones MD;  Location: [facility] MAIN OR;  Service:    • APPENDECTOMY  06/1972   • CAPSULE ENDOSCOPY  2015   • CARDIAC CATHETERIZATION N/A 8/25/2016    Procedure: Coronary angiography;  Surgeon: Lulú Hooper MD;  Location:  [facility] CATH INVASIVE LOCATION;  Service:    • CARDIAC CATHETERIZATION N/A 8/25/2016    Procedure: Right Heart Cath;  Surgeon: Lulú Hooper MD;  Location:  [facility] CATH INVASIVE LOCATION;  Service:    • CATARACT EXTRACTION Bilateral     Left 12/10/13, Right 12/17/2013   • CATARACT EXTRACTION     • COLONOSCOPY  06/22/2015    Dr. Mohamud   • DENTAL PROCEDURE  2011    Implants   • ENDOSCOPY  06/22/2015    Dr. Mohamud   • ENTEROSCOPY SMALL BOWEL N/A 11/21/2016    Procedure: ENTEROSCOPY SMALL BOWEL WITH APC CAUTERY;  Surgeon: Tj Torres MD;  Location:  [facility] ENDOSCOPY;  Service:    • PELVIC LAPAROSCOPY     • STERNOTOMY      mini   • TONSILLECTOMY     • TUBAL ABDOMINAL LIGATION         Social History     Socioeconomic History   • Marital status:      Spouse name: Kody   • Number of children: Not on file   • Years of education: College   • Highest education level: Not on file   Occupational History   • Occupation: Teacher, retired     Comment: [employer]   Tobacco Use   • Smoking status: Former Smoker     Packs/day: 1.00     Years: 20.00     Pack years: 20.00     Types: Cigarettes     Last attempt to quit: 2005     Years since quitting: 15.1   • Smokeless tobacco: Never Used   Substance and Sexual Activity   • Alcohol use: Yes     Comment: 1 drink a month///caffeine use: 1 cup daily   • Drug use: No   • Sexual activity: Never       Family History   Problem Relation Age of Onset   • Endometriosis Mother    • Dementia Mother    • Hypertension Mother    • Uterine cancer Mother    • Alzheimer's disease Mother    • Tongue cancer Father    • Heart failure Maternal Grandmother    • Heart attack Maternal Grandmother    • Stroke Maternal  Grandfather    • Heart disease Maternal Grandfather    • Hypertension Maternal Aunt    • Hypertension Maternal Uncle        The following portion of the patient's history were reviewed and updated as appropriate: past medical history, past surgical history, past social history, past family history, allergies, current medications, and problem list.    Review of Systems   Constitution: Positive for decreased appetite, malaise/fatigue and weight loss. Negative for chills, diaphoresis, fever, night sweats and weight gain.   HENT: Negative for hearing loss, nosebleeds, sore throat and tinnitus.    Eyes: Negative for blurred vision, double vision, pain and visual disturbance.   Cardiovascular: Negative for chest pain, claudication, cyanosis, dyspnea on exertion, irregular heartbeat, leg swelling, near-syncope, orthopnea, palpitations, paroxysmal nocturnal dyspnea and syncope.   Respiratory: Negative for cough, hemoptysis, snoring and wheezing.    Endocrine: Negative for cold intolerance, heat intolerance and polyuria.   Hematologic/Lymphatic: Negative for bleeding problem. Does not bruise/bleed easily.   Skin: Positive for itching. Negative for color change, dry skin and flushing.   Musculoskeletal: Negative for falls, joint pain, joint swelling, muscle cramps, muscle weakness and myalgias.   Gastrointestinal: Negative for abdominal pain, constipation, heartburn, melena, nausea and vomiting.   Genitourinary: Negative for dysuria and hematuria.   Neurological: Negative for excessive daytime sleepiness, dizziness, light-headedness, loss of balance, numbness, paresthesias, seizures and vertigo.   Psychiatric/Behavioral: Negative for altered mental status, depression, memory loss and substance abuse. The patient does not have insomnia and is not nervous/anxious.    Allergic/Immunologic: Negative for environmental allergies.       Allergies   Allergen Reactions   • Eggs Or Egg-Derived Products Swelling   • Formaldehyde Other  "(See Comments)     Positive allergy testing   • Other Swelling     Tree nuts, pecans, walnuts, eggs        Mouth swells   • Penicillins Rash   • Sulfa Antibiotics Rash         Current Outpatient Medications:   •  aspirin 81 MG EC tablet, Take 1 tablet by mouth Daily., Disp: , Rfl:   •  azelastine (ASTELIN) 0.1 % nasal spray, 2 sprays into the nostril(s) as directed by provider 2 (Two) Times a Day. Use in each nostril as directed, Disp: 1 each, Rfl: 5  •  Cyanocobalamin (VITAMIN B-12 PO), Take 1,000 mcg by mouth daily., Disp: , Rfl:   •  donepezil (ARICEPT) 5 MG tablet, TAKE 1 TABLET BY MOUTH EVERY NIGHT, Disp: 90 tablet, Rfl: 1  •  losartan-hydrochlorothiazide (HYZAAR) 100-25 MG per tablet, TAKE 1 TABLET DAILY, Disp: 90 tablet, Rfl: 4  •  metoprolol tartrate (LOPRESSOR) 50 MG tablet, TAKE 1 TABLET BY MOUTH TWICE DAILY, Disp: 180 tablet, Rfl: 0  •  Multiple Vitamins-Minerals (CENTRUM SILVER PO), Take 1 tablet by mouth Daily. PT HOLDING FOR SURGERY, Disp: , Rfl:   •  Pyridoxine HCl (VITAMIN B6 PO), Take 100 mg by mouth daily., Disp: , Rfl:   •  SYNTHROID 50 MCG tablet, TAKE 1 TABLET BY MOUTH DAILY, Disp: 90 tablet, Rfl: 1        Objective:     Vitals:    03/02/20 1355 03/02/20 1404   BP: 130/80 140/80   BP Location: Left arm Right arm   Pulse: 53    Weight: 52.1 kg (114 lb 12.8 oz)    Height: 154.9 cm (61\")      Body mass index is 21.69 kg/m².    PHYSICAL EXAM:    Vitals Reviewed.   General Appearance: No acute distress, thin.  Eyes: Conjunctiva and lids: No erythema, swelling, or discharge. Sclera non-icteric.   HENT: Atraumatic, normocephalic. External eyes, ears, and nose normal. No hearing loss noted. Mucous membranes normal. Lips not cyanotic. Neck supple with no tenderness.  Respiratory: No signs of respiratory distress. Respiration rhythm and depth normal.   Clear to auscultation. No rales, crackles, rhonchi, or wheezing auscultated.   Cardiovascular:  Jugular Venous Pressure: Normal  Heart Rate and Rhythm: " Normal rhythm; bradycardic.  Heart Sounds: Normal S1 and S2. No S3 or S4 noted.  Murmurs: No murmurs noted. No rubs, thrills, or gallops.   Lower Extremities: No edema noted.  Gastrointestinal:  Abdomen soft, non-distended, non-tender. Normal bowel sounds. No hepatomegaly.   Musculoskeletal: Normal movement of extremities  Skin and Nails: General appearance normal. No pallor, cyanosis, diaphoresis. Skin temperature normal. No clubbing of fingernails.   Psychiatric: Patient alert and oriented to person, place, and time. Speech and behavior appropriate. Normal mood and affect.       ECG 12 Lead  Date/Time: 3/2/2020 1:59 PM  Performed by: Starr Ho APRN  Authorized by: Starr Ho APRN   Comparison: compared with previous ECG from 2/27/2019  Similar to previous ECG  Rhythm: sinus rhythm  Rate: bradycardic  BPM: 53  Conduction: conduction normal  ST Segments: ST segments normal  T Waves: T waves normal  QRS axis: normal  Other: no other findings    Clinical impression: non-specific ECG              Assessment:       Diagnosis Plan   1. Nonrheumatic aortic valve stenosis     2. S/P AVR  Adult Transthoracic Echo Complete W/ Cont if Necessary Per Protocol   3. Chills  Adult Transthoracic Echo Complete W/ Cont if Necessary Per Protocol   4. Weight loss  Adult Transthoracic Echo Complete W/ Cont if Necessary Per Protocol   5. Postoperative atrial fibrillation (CMS/HCC)     6. Coronary artery disease involving native coronary artery of native heart without angina pectoris     7. Essential hypertension     8. Sinus bradycardia            Plan:       1/2/3.  Aortic Stenosis: Status post aortic valve replacement.  She has had some unintentional weight loss over the year and reports chills.  I have low suspicion for vegetation on her heart valve, but will reassess with an echocardiogram.    4.  Unintentional Weight Loss: According to our scales, she has lost 30 pounds over the past year.  She is not trying to lose  weight and has decreased appetite.  I have recommended follow-up with her PCP.    5.  Postoperative Atrial Fibrillation: Previously on warfarin and amiodarone, both discontinued.  Follow-up Holter monitor in March 2017 showed no recurrent atrial fibrillation.  Normal sinus rhythm today.    6.  Coronary Artery Disease: Noted to be mild, nonobstructive per cardiac catheterization 2016.  Continue aspirin and metoprolol.    7.  Hypertension: Blood pressure borderline elevated today.  I recommended reduction of sodium in her diet less than 2000 mg daily.      8.  Bradycardia: Recent Holter monitor showed average heart rate in the 50s.  I will review her echocardiogram and I may decrease her metoprolol tartrate to 25 mg daily to see if that could be contributing to decreased appetite.      9.  Further recommendations to follow.    As always, it has been a pleasure to participate in your patient's care. Thank you.       Sincerely,         KEVIN Jordan        **Dragon Disclaimer:**  Much of this encounter note is an electronic transcription/translation of spoken language to printed text. The electronic translation of spoken language may permit erroneous, or at times, nonsensical words or phrases to be inadvertently transcribed. Although I have reviewed the note for such errors, some may still exist.

## 2020-03-23 ENCOUNTER — APPOINTMENT (OUTPATIENT)
Dept: CARDIOLOGY | Facility: HOSPITAL | Age: 80
End: 2020-03-23

## 2020-04-13 ENCOUNTER — OFFICE VISIT (OUTPATIENT)
Dept: INTERNAL MEDICINE | Facility: CLINIC | Age: 80
End: 2020-04-13

## 2020-04-13 VITALS
HEART RATE: 65 BPM | BODY MASS INDEX: 20.78 KG/M2 | DIASTOLIC BLOOD PRESSURE: 75 MMHG | SYSTOLIC BLOOD PRESSURE: 147 MMHG | WEIGHT: 110 LBS | TEMPERATURE: 97.3 F

## 2020-04-13 DIAGNOSIS — E78.2 MIXED HYPERLIPIDEMIA: ICD-10-CM

## 2020-04-13 DIAGNOSIS — I10 ESSENTIAL HYPERTENSION: ICD-10-CM

## 2020-04-13 DIAGNOSIS — E03.9 HYPOTHYROIDISM, ADULT: ICD-10-CM

## 2020-04-13 DIAGNOSIS — J31.0 RHINITIS, UNSPECIFIED TYPE: Primary | ICD-10-CM

## 2020-04-13 PROCEDURE — 99442 PR PHYS/QHP TELEPHONE EVALUATION 11-20 MIN: CPT | Performed by: INTERNAL MEDICINE

## 2020-04-13 RX ORDER — MONTELUKAST SODIUM 10 MG/1
10 TABLET ORAL NIGHTLY
Qty: 30 TABLET | Refills: 5 | Status: SHIPPED | OUTPATIENT
Start: 2020-04-13 | End: 2020-09-14

## 2020-04-13 NOTE — PROGRESS NOTES
Chief Complaint   Patient presents with   • Hypertension   • Hyperlipidemia   • Hypothyroidism       Subjective   Dory Hampton is a 79 y.o. female.     History of Present Illness     Telephone visit done today to follow-up on chronic problems of hypertension, hyperlipidemia and hypothyroidism.  She also complains of a runny nose.  Her nose runs constantly.  She is currently taking Claritin and using azelastine nasal spray.  They help a little.  She is wondering if anything else can be done.  She continues on her medications as listed.  Her family took her vital signs for our visit today.  They are recorded in the vital signs section.  Blood pressure is adequately controlled.  She has not had problems with chest pain, chest pressure, shortness of breath.  She reports problems with dry skin and dry lips.       Lab Results   Component Value Date    CHOL 182 07/30/2019    CHLPL 239 (H) 01/20/2020    TRIG 84 01/20/2020    HDL 84 (H) 01/20/2020     (H) 01/20/2020      Lab Results   Component Value Date    TSH 2.300 01/20/2020        The following portions of the patient's history were reviewed and updated as appropriate: allergies, current medications, past family history, past medical history, past social history, past surgical history and problem list.    Review of Systems   Constitutional: Negative for appetite change.   HENT: Positive for rhinorrhea. Negative for nosebleeds.    Eyes: Negative for blurred vision and double vision.   Respiratory: Negative for cough and shortness of breath.    Cardiovascular: Negative for chest pain, palpitations and leg swelling.   Endocrine: Positive for cold intolerance. Negative for heat intolerance.         Current Outpatient Medications:   •  aspirin 81 MG EC tablet, Take 1 tablet by mouth Daily., Disp: , Rfl:   •  azelastine (ASTELIN) 0.1 % nasal spray, 2 sprays into the nostril(s) as directed by provider 2 (Two) Times a Day. Use in each nostril as directed, Disp: 1 each,  Rfl: 5  •  Cyanocobalamin (VITAMIN B-12 PO), Take 1,000 mcg by mouth daily., Disp: , Rfl:   •  donepezil (ARICEPT) 5 MG tablet, TAKE 1 TABLET BY MOUTH EVERY NIGHT, Disp: 90 tablet, Rfl: 1  •  losartan-hydrochlorothiazide (HYZAAR) 100-25 MG per tablet, TAKE 1 TABLET DAILY, Disp: 90 tablet, Rfl: 4  •  metoprolol tartrate (LOPRESSOR) 50 MG tablet, TAKE 1 TABLET BY MOUTH TWICE DAILY, Disp: 180 tablet, Rfl: 0  •  Multiple Vitamins-Minerals (CENTRUM SILVER PO), Take 1 tablet by mouth Daily. PT HOLDING FOR SURGERY, Disp: , Rfl:   •  Pyridoxine HCl (VITAMIN B6 PO), Take 100 mg by mouth daily., Disp: , Rfl:   •  SYNTHROID 50 MCG tablet, TAKE 1 TABLET BY MOUTH DAILY, Disp: 90 tablet, Rfl: 1        Objective     /75   Pulse 65   Temp 97.3 °F (36.3 °C)   Wt 49.9 kg (110 lb)   LMP  (LMP Unknown)   BMI 20.78 kg/m²     Physical Exam   Psychiatric: Her speech is normal.         Assessment/Plan   Dory was seen today for hypertension, hyperlipidemia and hypothyroidism.    Diagnoses and all orders for this visit:    Rhinitis, unspecified type    Essential hypertension    Mixed hyperlipidemia    Hypothyroidism, adult      She will continue azelastine nasal spray and Claritin for runny nose.  Montelukast added.  Advised to her blood pressure is adequately controlled.  She will continue her current medications.  Advised her to try Vaseline for dry lips.  Discussed recent lipid panel.  Total cholesterol and LDL are higher than previously.  Her HDL is good.  Encouraged her to continue prudent diet.  TSH is in the therapeutic range.  Encouraged her to continue her current dose of Synthroid.  This visit has been rescheduled as a phone visit to comply with patient safety concerns in accordance with CDC recommendations. Total time of discussion was 20 minutes.

## 2020-04-14 ENCOUNTER — TELEPHONE (OUTPATIENT)
Dept: INTERNAL MEDICINE | Facility: CLINIC | Age: 80
End: 2020-04-14

## 2020-04-14 NOTE — TELEPHONE ENCOUNTER
----- Message from Natasha Billy MD sent at 4/13/2020  2:01 PM EDT -----  She will need to be scheduled for a follow-up visit in 3 to 4 months.  Ideally for 30 minutes.

## 2020-04-14 NOTE — TELEPHONE ENCOUNTER
Left message to schedule follow up in 3-4 months.   HUB-Please schedule if patient calls back, NEEDS 30 MINUTE APPT.

## 2020-05-04 ENCOUNTER — HOSPITAL ENCOUNTER (OUTPATIENT)
Dept: CARDIOLOGY | Facility: HOSPITAL | Age: 80
Discharge: HOME | End: 2020-05-04
Admitting: NURSE PRACTITIONER

## 2020-05-04 ENCOUNTER — TELEPHONE (OUTPATIENT)
Dept: CARDIOLOGY | Facility: CLINIC | Age: 80
End: 2020-05-04

## 2020-05-04 VITALS — DIASTOLIC BLOOD PRESSURE: 60 MMHG | HEART RATE: 66 BPM | SYSTOLIC BLOOD PRESSURE: 158 MMHG

## 2020-05-04 DIAGNOSIS — R68.83 CHILLS: ICD-10-CM

## 2020-05-04 DIAGNOSIS — R63.4 WEIGHT LOSS: ICD-10-CM

## 2020-05-04 DIAGNOSIS — Z95.2 S/P AVR: ICD-10-CM

## 2020-05-04 LAB
BH CV ECHO MEAS - AO MAX PG (FULL): 21.8 MMHG
BH CV ECHO MEAS - AO MAX PG: 26 MMHG
BH CV ECHO MEAS - AO MEAN PG (FULL): 10.3 MMHG
BH CV ECHO MEAS - AO MEAN PG: 12.6 MMHG
BH CV ECHO MEAS - AO ROOT AREA (BSA CORRECTED): 1.4
BH CV ECHO MEAS - AO ROOT AREA: 3.7 CM^2
BH CV ECHO MEAS - AO ROOT DIAM: 2.2 CM
BH CV ECHO MEAS - AO V2 MAX: 255.2 CM/SEC
BH CV ECHO MEAS - AO V2 MEAN: 166.1 CM/SEC
BH CV ECHO MEAS - AO V2 VTI: 61 CM
BH CV ECHO MEAS - AVA(I,A): 0.97 CM^2
BH CV ECHO MEAS - AVA(I,D): 0.97 CM^2
BH CV ECHO MEAS - AVA(V,A): 0.89 CM^2
BH CV ECHO MEAS - AVA(V,D): 0.89 CM^2
BH CV ECHO MEAS - BSA(HAYCOCK): 1.5 M^2
BH CV ECHO MEAS - BSA: 1.5 M^2
BH CV ECHO MEAS - BZI_BMI: 21.8 KILOGRAMS/M^2
BH CV ECHO MEAS - BZI_METRIC_HEIGHT: 157.5 CM
BH CV ECHO MEAS - BZI_METRIC_WEIGHT: 54 KG
BH CV ECHO MEAS - EDV(MOD-SP2): 63 ML
BH CV ECHO MEAS - EDV(MOD-SP4): 64 ML
BH CV ECHO MEAS - EDV(TEICH): 76.5 ML
BH CV ECHO MEAS - EF(CUBED): 61.7 %
BH CV ECHO MEAS - EF(MOD-BP): 63 %
BH CV ECHO MEAS - EF(MOD-SP2): 65.1 %
BH CV ECHO MEAS - EF(MOD-SP4): 60.9 %
BH CV ECHO MEAS - EF(TEICH): 53.7 %
BH CV ECHO MEAS - ESV(MOD-SP2): 22 ML
BH CV ECHO MEAS - ESV(MOD-SP4): 25 ML
BH CV ECHO MEAS - ESV(TEICH): 35.5 ML
BH CV ECHO MEAS - FS: 27.4 %
BH CV ECHO MEAS - IVS/LVPW: 0.99
BH CV ECHO MEAS - IVSD: 1.1 CM
BH CV ECHO MEAS - LAT PEAK E' VEL: 7 CM/SEC
BH CV ECHO MEAS - LV DIASTOLIC VOL/BSA (35-75): 41.7 ML/M^2
BH CV ECHO MEAS - LV MASS(C)D: 151.9 GRAMS
BH CV ECHO MEAS - LV MASS(C)DI: 99.1 GRAMS/M^2
BH CV ECHO MEAS - LV MAX PG: 4.2 MMHG
BH CV ECHO MEAS - LV MEAN PG: 2.3 MMHG
BH CV ECHO MEAS - LV SYSTOLIC VOL/BSA (12-30): 16.3 ML/M^2
BH CV ECHO MEAS - LV V1 MAX: 102.8 CM/SEC
BH CV ECHO MEAS - LV V1 MEAN: 70.5 CM/SEC
BH CV ECHO MEAS - LV V1 VTI: 26.8 CM
BH CV ECHO MEAS - LVIDD: 4.2 CM
BH CV ECHO MEAS - LVIDS: 3 CM
BH CV ECHO MEAS - LVLD AP2: 7 CM
BH CV ECHO MEAS - LVLD AP4: 6.8 CM
BH CV ECHO MEAS - LVLS AP2: 5.6 CM
BH CV ECHO MEAS - LVLS AP4: 5.8 CM
BH CV ECHO MEAS - LVOT AREA (M): 2.3 CM^2
BH CV ECHO MEAS - LVOT AREA: 2.2 CM^2
BH CV ECHO MEAS - LVOT DIAM: 1.7 CM
BH CV ECHO MEAS - LVPWD: 1.1 CM
BH CV ECHO MEAS - MED PEAK E' VEL: 12 CM/SEC
BH CV ECHO MEAS - MV A DUR: 0.12 SEC
BH CV ECHO MEAS - MV A MAX VEL: 132.6 CM/SEC
BH CV ECHO MEAS - MV DEC SLOPE: 508.9 CM/SEC^2
BH CV ECHO MEAS - MV DEC TIME: 0.26 SEC
BH CV ECHO MEAS - MV E MAX VEL: 110 CM/SEC
BH CV ECHO MEAS - MV E/A: 0.83
BH CV ECHO MEAS - MV MAX PG: 6.7 MMHG
BH CV ECHO MEAS - MV MEAN PG: 2.2 MMHG
BH CV ECHO MEAS - MV P1/2T MAX VEL: 137.1 CM/SEC
BH CV ECHO MEAS - MV P1/2T: 78.9 MSEC
BH CV ECHO MEAS - MV V2 MAX: 129 CM/SEC
BH CV ECHO MEAS - MV V2 MEAN: 65.4 CM/SEC
BH CV ECHO MEAS - MV V2 VTI: 49.4 CM
BH CV ECHO MEAS - MVA P1/2T LCG: 1.6 CM^2
BH CV ECHO MEAS - MVA(P1/2T): 2.8 CM^2
BH CV ECHO MEAS - MVA(VTI): 1.2 CM^2
BH CV ECHO MEAS - PA MAX PG (FULL): 7.3 MMHG
BH CV ECHO MEAS - PA MAX PG: 8.8 MMHG
BH CV ECHO MEAS - PA V2 MAX: 148 CM/SEC
BH CV ECHO MEAS - PULM A REVS DUR: 0.12 SEC
BH CV ECHO MEAS - PULM A REVS VEL: 36.9 CM/SEC
BH CV ECHO MEAS - PULM DIAS VEL: 47.5 CM/SEC
BH CV ECHO MEAS - PULM S/D: 1.3
BH CV ECHO MEAS - PULM SYS VEL: 59.8 CM/SEC
BH CV ECHO MEAS - PVA(V,A): 1.3 CM^2
BH CV ECHO MEAS - PVA(V,D): 1.3 CM^2
BH CV ECHO MEAS - QP/QS: 0.96
BH CV ECHO MEAS - RAP SYSTOLE: 15 MMHG
BH CV ECHO MEAS - RV BASE (<4.1) - OBSOLETE: 3.1 CM
BH CV ECHO MEAS - RV LENGTH (<8.5) - OBSOLETE: 6.5 CM
BH CV ECHO MEAS - RV MAX PG: 1.5 MMHG
BH CV ECHO MEAS - RV MEAN PG: 0.92 MMHG
BH CV ECHO MEAS - RV V1 MAX: 60.4 CM/SEC
BH CV ECHO MEAS - RV V1 MEAN: 45.9 CM/SEC
BH CV ECHO MEAS - RV V1 VTI: 17.6 CM
BH CV ECHO MEAS - RVOT AREA: 3.2 CM^2
BH CV ECHO MEAS - RVOT DIAM: 2 CM
BH CV ECHO MEAS - RVSP: 39.1 MMHG
BH CV ECHO MEAS - SI(AO): 146.7 ML/M^2
BH CV ECHO MEAS - SI(CUBED): 28.8 ML/M^2
BH CV ECHO MEAS - SI(LVOT): 38.5 ML/M^2
BH CV ECHO MEAS - SI(MOD-SP2): 26.7 ML/M^2
BH CV ECHO MEAS - SI(MOD-SP4): 25.4 ML/M^2
BH CV ECHO MEAS - SI(TEICH): 26.8 ML/M^2
BH CV ECHO MEAS - SUP REN AO DIAM: 1.6 CM
BH CV ECHO MEAS - SV(AO): 224.9 ML
BH CV ECHO MEAS - SV(CUBED): 44.2 ML
BH CV ECHO MEAS - SV(LVOT): 59 ML
BH CV ECHO MEAS - SV(MOD-SP2): 41 ML
BH CV ECHO MEAS - SV(MOD-SP4): 39 ML
BH CV ECHO MEAS - SV(RVOT): 56.5 ML
BH CV ECHO MEAS - SV(TEICH): 41.1 ML
BH CV ECHO MEAS - TAPSE (>1.6): 1.5 CM2
BH CV ECHO MEAS - TR MAX VEL: 245.7 CM/SEC
BH CV ECHO MEASUREMENTS AVERAGE E/E' RATIO: 11.58
BH CV XLRA - RV BASE: 3.1 CM
BH CV XLRA - RV LENGTH: 6.5 CM
BH CV XLRA - RV MID: 2.3 CM
BH CV XLRA - TDI S': 12 CM/SEC
LEFT ATRIUM VOLUME INDEX: 31 ML/M2
LEFT ATRIUM VOLUME: 48 CM3

## 2020-05-04 PROCEDURE — 93306 TTE W/DOPPLER COMPLETE: CPT

## 2020-05-04 PROCEDURE — 93306 TTE W/DOPPLER COMPLETE: CPT | Performed by: INTERNAL MEDICINE

## 2020-05-04 NOTE — TELEPHONE ENCOUNTER
Echo Result Text     · Left ventricular wall thickness is consistent with mild concentric hypertrophy.  · Left ventricular systolic function is normal.  · Calculated EF = 63%.  · Left ventricular diastolic dysfunction (grade I) consistent with impaired relaxation.  · Left atrial cavity size is mildly dilated.  · There is a prosthetic aortic valve. Trace aortic valve regurgitation is present. There is a 21 mm bioprosthetic valve present. The aortic valve peak and mean gradients are within defined limits. The prosthetic valve is normal.     Patient informed about echo results.  Aortic valve is functioning fine and we were just assessing if there was any vegetation because she has had unintended weight loss over the year.  She says since then she is followed up with her PCP who thought maybe her thyroid was contributing to the weight loss and her medication has been adjusted.  Her weight is holding steady at this time.  We will continue with her current dose of metoprolol and not make any changes today.    Scheduling-please arrange a six-month follow-up appointment with Dr. Bryan.      
(0) independent

## 2020-05-06 RX ORDER — METOPROLOL TARTRATE 50 MG/1
TABLET, FILM COATED ORAL
Qty: 180 TABLET | Refills: 1 | Status: SHIPPED | OUTPATIENT
Start: 2020-05-06 | End: 2020-10-12

## 2020-06-04 RX ORDER — LEVOTHYROXINE SODIUM 50 MCG
50 TABLET ORAL DAILY
Qty: 90 TABLET | Refills: 1 | Status: SHIPPED | OUTPATIENT
Start: 2020-06-04 | End: 2020-12-11

## 2020-06-29 RX ORDER — DONEPEZIL HYDROCHLORIDE 5 MG/1
5 TABLET, FILM COATED ORAL NIGHTLY
Qty: 90 TABLET | Refills: 1 | Status: SHIPPED | OUTPATIENT
Start: 2020-06-29 | End: 2020-11-24 | Stop reason: SDUPTHER

## 2020-07-29 ENCOUNTER — OFFICE VISIT (OUTPATIENT)
Dept: INTERNAL MEDICINE | Facility: CLINIC | Age: 80
End: 2020-07-29

## 2020-07-29 VITALS
HEIGHT: 61 IN | OXYGEN SATURATION: 98 % | SYSTOLIC BLOOD PRESSURE: 124 MMHG | WEIGHT: 115.6 LBS | HEART RATE: 52 BPM | BODY MASS INDEX: 21.83 KG/M2 | DIASTOLIC BLOOD PRESSURE: 68 MMHG

## 2020-07-29 DIAGNOSIS — E03.9 HYPOTHYROIDISM, ADULT: ICD-10-CM

## 2020-07-29 DIAGNOSIS — I48.0 PAROXYSMAL ATRIAL FIBRILLATION (HCC): ICD-10-CM

## 2020-07-29 DIAGNOSIS — R09.82 POST-NASAL DRIP: ICD-10-CM

## 2020-07-29 DIAGNOSIS — I10 ESSENTIAL HYPERTENSION: Primary | ICD-10-CM

## 2020-07-29 DIAGNOSIS — Z11.59 SCREENING FOR VIRAL DISEASE: ICD-10-CM

## 2020-07-29 DIAGNOSIS — R41.89 COGNITIVE CHANGE: ICD-10-CM

## 2020-07-29 DIAGNOSIS — E78.2 MIXED HYPERLIPIDEMIA: ICD-10-CM

## 2020-07-29 PROCEDURE — 99214 OFFICE O/P EST MOD 30 MIN: CPT | Performed by: INTERNAL MEDICINE

## 2020-07-29 RX ORDER — LORATADINE 10 MG/1
10 TABLET ORAL DAILY
COMMUNITY

## 2020-07-29 RX ORDER — HYDROCORTISONE BUTYRATE 1 MG/G
CREAM TOPICAL
Qty: 45 G | Refills: 0 | Status: SHIPPED | OUTPATIENT
Start: 2020-07-29 | End: 2021-03-23

## 2020-07-29 RX ORDER — UREA 10 %
LOTION (ML) TOPICAL NIGHTLY
COMMUNITY
End: 2021-05-11

## 2020-07-29 NOTE — PATIENT INSTRUCTIONS
OK to try Flonase nasal spray to help post nasal drip.  It is available over the counter.  The generic name is fluticasone.   OK to try Zyrtec instead of claritin.

## 2020-07-29 NOTE — PROGRESS NOTES
Chief Complaint   Patient presents with   • Hypertension     4 month follow up       Subjective   Dory Hampton is a 79 y.o. female.     History of Present Illness     Hypertension: This is a chronic problem.  She has not had symptoms of headache, visual disturbance, dizziness or shortness of breath.  Her blood pressures have generally been controlled.  She has not had associated symptoms of chest pain/chest pressure, PND, orthopnea, edema, syncope or near syncope.  Current treatment includes an ARB and thiazide diuretic.  Prudent diet and regular exercise have also been recommended.  By report, there is good compliance with treatment and good tolerance of treatment.       Hyperlipidemia:    This is a chronic problem.  Her most recent lipid panel showed:  Lab Results   Component Value Date    CHOL 182 07/30/2019    CHLPL 239 (H) 01/20/2020    TRIG 84 01/20/2020    HDL 84 (H) 01/20/2020     (H) 01/20/2020     Current treatment: lifestyle changes.       Hypothyroidism:  This is a chronic problem.  Symptoms do not include unexpected weight changes, changes in heat/cold tolerance, fatigue, weakness, constipation, goiter, appetite change, change in hair/skin.  Current treatment: Levothyroxine.  By report, there is good compliance with treatment, good tolerance of treatment and good symptom control.    Lab Results   Component Value Date    TSH 2.300 01/20/2020        She reports she has been prescribed Locoid Lipocream for skin problems in the past.  She would like a refill on that.      Memory problems: This is chronic.  She currently takes donepezil and memantine.  There is been some stabilization of her memory.  She still requires help from her family to remember to take her medications.    Postnasal drip: This is a chronic problem.  It is bothering her significantly.  Azelastine nasal spray does not seem to be helping.  She is also taking an over-the-counter antihistamine, Claritin.  She also takes  montelukast.    The following portions of the patient's history were reviewed and updated as appropriate: allergies, current medications, past family history, past medical history, past social history, past surgical history and problem list.    Review of Systems   Constitutional: Negative for appetite change.   HENT: Negative for nosebleeds.    Eyes: Negative for blurred vision and double vision.   Respiratory: Negative for cough and shortness of breath.    Cardiovascular: Negative for chest pain, palpitations and leg swelling.   Neurological: Positive for memory problem.         Current Outpatient Medications:   •  aspirin 81 MG EC tablet, Take 1 tablet by mouth Daily., Disp: , Rfl:   •  Cyanocobalamin (VITAMIN B-12 PO), Take 1,000 mcg by mouth daily., Disp: , Rfl:   •  donepezil (ARICEPT) 5 MG tablet, TAKE 1 TABLET BY MOUTH EVERY NIGHT, Disp: 90 tablet, Rfl: 1  •  loratadine (Claritin) 10 MG tablet, Take 10 mg by mouth Daily., Disp: , Rfl:   •  losartan-hydrochlorothiazide (HYZAAR) 100-25 MG per tablet, TAKE 1 TABLET DAILY, Disp: 90 tablet, Rfl: 4  •  melatonin 1 MG tablet, Take  by mouth Every Night., Disp: , Rfl:   •  metoprolol tartrate (LOPRESSOR) 50 MG tablet, TAKE 1 TABLET BY MOUTH TWICE DAILY, Disp: 180 tablet, Rfl: 1  •  montelukast (Singulair) 10 MG tablet, Take 1 tablet by mouth Every Night., Disp: 30 tablet, Rfl: 5  •  Multiple Vitamins-Minerals (CENTRUM SILVER PO), Take 1 tablet by mouth Daily. PT HOLDING FOR SURGERY, Disp: , Rfl:   •  Pyridoxine HCl (VITAMIN B6 PO), Take 100 mg by mouth daily., Disp: , Rfl:   •  SYNTHROID 50 MCG tablet, TAKE 1 TABLET BY MOUTH DAILY, Disp: 90 tablet, Rfl: 1  •  azelastine (ASTELIN) 0.1 % nasal spray, 2 sprays into the nostril(s) as directed by provider 2 (Two) Times a Day. Use in each nostril as directed, Disp: 1 each, Rfl: 5  •  Hydrocortisone Butyr Lipo Base (Locoid Lipocream) 0.1 % cream, Apply sparingly twice a day as needed for 7 days., Disp: 45 g, Rfl:  "0        Objective     /68 (BP Location: Right arm, Patient Position: Sitting, Cuff Size: Adult)   Pulse 52   Ht 154.9 cm (61\")   Wt 52.4 kg (115 lb 9.6 oz)   LMP  (LMP Unknown)   SpO2 98%   BMI 21.84 kg/m²     Physical Exam   Constitutional: She appears well-developed and well-nourished. No distress.   Neck: Normal carotid pulses present. Carotid bruit is not present.   Cardiovascular: Regular rhythm, S1 normal and S2 normal. Exam reveals no gallop and no friction rub.   No murmur heard.  Pulses:       Carotid pulses are 2+ on the right side, and 2+ on the left side.  Pulmonary/Chest: Effort normal and breath sounds normal. She has no wheezes. She has no rhonchi. She has no rales. Chest wall is not dull to percussion.   Musculoskeletal: She exhibits no edema.   Neurological: She is alert.   Skin: Skin is warm and dry.   Psychiatric: She has a normal mood and affect. Cognition and memory are impaired.   Nursing note and vitals reviewed.        Assessment/Plan   Dory was seen today for hypertension.    Diagnoses and all orders for this visit:    Essential hypertension  -     Comprehensive Metabolic Panel  -     Lipid Panel With / Chol / HDL Ratio    Mixed hyperlipidemia    Post-nasal drip    Screening for viral disease  -     Hepatitis C Antibody    Paroxysmal atrial fibrillation (CMS/HCC)    Hypothyroidism, adult    Cognitive change    Other orders  -     Hydrocortisone Butyr Lipo Base (Locoid Lipocream) 0.1 % cream; Apply sparingly twice a day as needed for 7 days.      Patient advised she could add over-the-counter Flonase nasal spray and try Zyrtec instead of Claritin for postnasal drip.         "

## 2020-07-30 LAB
ALBUMIN SERPL-MCNC: 4.6 G/DL (ref 3.5–5.2)
ALBUMIN/GLOB SERPL: 2.3 G/DL
ALP SERPL-CCNC: 59 U/L (ref 39–117)
ALT SERPL-CCNC: 13 U/L (ref 1–33)
AST SERPL-CCNC: 28 U/L (ref 1–32)
BILIRUB SERPL-MCNC: 0.4 MG/DL (ref 0–1.2)
BUN SERPL-MCNC: 31 MG/DL (ref 8–23)
BUN/CREAT SERPL: 27 (ref 7–25)
CALCIUM SERPL-MCNC: 9.6 MG/DL (ref 8.6–10.5)
CHLORIDE SERPL-SCNC: 101 MMOL/L (ref 98–107)
CHOLEST SERPL-MCNC: 211 MG/DL (ref 0–200)
CHOLEST/HDLC SERPL: 2.97 {RATIO}
CO2 SERPL-SCNC: 30.2 MMOL/L (ref 22–29)
CREAT SERPL-MCNC: 1.15 MG/DL (ref 0.57–1)
GLOBULIN SER CALC-MCNC: 2 GM/DL
GLUCOSE SERPL-MCNC: 88 MG/DL (ref 65–99)
HCV AB S/CO SERPL IA: <0.1 S/CO RATIO (ref 0–0.9)
HDLC SERPL-MCNC: 71 MG/DL (ref 40–60)
LDLC SERPL CALC-MCNC: 106 MG/DL (ref 0–100)
POTASSIUM SERPL-SCNC: 4.1 MMOL/L (ref 3.5–5.2)
PROT SERPL-MCNC: 6.6 G/DL (ref 6–8.5)
SODIUM SERPL-SCNC: 142 MMOL/L (ref 136–145)
TRIGL SERPL-MCNC: 172 MG/DL (ref 0–150)
VLDLC SERPL CALC-MCNC: 34.4 MG/DL

## 2020-09-01 ENCOUNTER — TELEPHONE (OUTPATIENT)
Dept: INTERNAL MEDICINE | Facility: CLINIC | Age: 80
End: 2020-09-01

## 2020-09-01 RX ORDER — AZITHROMYCIN 500 MG/1
500 TABLET, FILM COATED ORAL DAILY
Qty: 1 TABLET | Refills: 0 | Status: SHIPPED | OUTPATIENT
Start: 2020-09-01 | End: 2020-09-02

## 2020-09-01 NOTE — TELEPHONE ENCOUNTER
PT HAS A DENTIST APPOINTMENT AND THEY ADVISED THE PTS DAUGHTER THAT BECAUSE THE PT HAS A HEART CONDITION SHE WILL NEED TO TAKE ANTIBIOTICS BEFORE HER APPOINTMENT. PLEASE ADVISE.

## 2020-09-01 NOTE — TELEPHONE ENCOUNTER
I sent in a prescription for azithromycin 500 mgm. She takes this one hour prior to dental procedure.

## 2020-09-14 RX ORDER — MONTELUKAST SODIUM 10 MG/1
10 TABLET ORAL NIGHTLY
Qty: 30 TABLET | Refills: 5 | Status: SHIPPED | OUTPATIENT
Start: 2020-09-14 | End: 2021-03-11

## 2020-10-12 RX ORDER — METOPROLOL TARTRATE 50 MG/1
TABLET, FILM COATED ORAL
Qty: 180 TABLET | Refills: 1 | Status: SHIPPED | OUTPATIENT
Start: 2020-10-12 | End: 2020-12-01 | Stop reason: SDUPTHER

## 2020-11-02 ENCOUNTER — TELEPHONE (OUTPATIENT)
Dept: INTERNAL MEDICINE | Facility: CLINIC | Age: 80
End: 2020-11-02

## 2020-11-02 RX ORDER — AZITHROMYCIN 500 MG/1
TABLET, FILM COATED ORAL
Qty: 3 TABLET | Refills: 0 | Status: SHIPPED | OUTPATIENT
Start: 2020-11-02 | End: 2020-11-24

## 2020-11-17 ENCOUNTER — TELEPHONE (OUTPATIENT)
Dept: INTERNAL MEDICINE | Facility: CLINIC | Age: 80
End: 2020-11-17

## 2020-11-17 NOTE — TELEPHONE ENCOUNTER
PATIENT'S DAUGHTER CECELIA CALLED TO GET CLARIFICATION ABOUT THE [facility]QU[facility]'S UPCOMING APPOINTMENT. THE OFFICE CALLED BUT SOMETIMES THE PATIENT GETS A LITTLE CONFUSED. CECELIA WOULD LIKE TO KNOW IF THE PATIENT CAN EAT PRIOR TO THE APPOINTMENT AND SHE ALSO WANTED TO LET DR. MONTGOMERY KNOW THAT THE PATIENT HAS FELT FAINT TWO TIMES IN THE PAST TWO WEEKS    PLEASE CALL BACK  [phone]   IF NO ANSWER PLEASE LEAVE A MESSAGE WITH THE DETAILS

## 2020-11-23 DIAGNOSIS — Z12.31 ENCOUNTER FOR SCREENING MAMMOGRAM FOR BREAST CANCER: ICD-10-CM

## 2020-11-23 DIAGNOSIS — Z78.0 POST-MENOPAUSAL: Primary | ICD-10-CM

## 2020-11-24 ENCOUNTER — CLINICAL SUPPORT (OUTPATIENT)
Dept: INTERNAL MEDICINE | Facility: CLINIC | Age: 80
End: 2020-11-24

## 2020-11-24 ENCOUNTER — APPOINTMENT (OUTPATIENT)
Dept: WOMENS IMAGING | Facility: HOSPITAL | Age: 80
End: 2020-11-24

## 2020-11-24 ENCOUNTER — OFFICE VISIT (OUTPATIENT)
Dept: INTERNAL MEDICINE | Facility: CLINIC | Age: 80
End: 2020-11-24

## 2020-11-24 VITALS
HEIGHT: 60 IN | DIASTOLIC BLOOD PRESSURE: 60 MMHG | TEMPERATURE: 97.3 F | WEIGHT: 121 LBS | SYSTOLIC BLOOD PRESSURE: 124 MMHG | BODY MASS INDEX: 23.75 KG/M2 | OXYGEN SATURATION: 99 % | HEART RATE: 49 BPM

## 2020-11-24 DIAGNOSIS — E78.2 MIXED HYPERLIPIDEMIA: ICD-10-CM

## 2020-11-24 DIAGNOSIS — Z78.0 POST-MENOPAUSAL: ICD-10-CM

## 2020-11-24 DIAGNOSIS — I48.0 PAROXYSMAL ATRIAL FIBRILLATION (HCC): ICD-10-CM

## 2020-11-24 DIAGNOSIS — R41.89 COGNITIVE CHANGE: ICD-10-CM

## 2020-11-24 DIAGNOSIS — I10 ESSENTIAL HYPERTENSION: Primary | ICD-10-CM

## 2020-11-24 DIAGNOSIS — R53.83 FATIGUE, UNSPECIFIED TYPE: ICD-10-CM

## 2020-11-24 DIAGNOSIS — Z12.31 ENCOUNTER FOR SCREENING MAMMOGRAM FOR BREAST CANCER: ICD-10-CM

## 2020-11-24 DIAGNOSIS — E03.9 HYPOTHYROIDISM, ADULT: ICD-10-CM

## 2020-11-24 PROCEDURE — 99214 OFFICE O/P EST MOD 30 MIN: CPT | Performed by: INTERNAL MEDICINE

## 2020-11-24 PROCEDURE — 77067 SCR MAMMO BI INCL CAD: CPT | Performed by: RADIOLOGY

## 2020-11-24 PROCEDURE — 77080 DXA BONE DENSITY AXIAL: CPT | Performed by: INTERNAL MEDICINE

## 2020-11-24 PROCEDURE — 77067 SCR MAMMO BI INCL CAD: CPT | Performed by: INTERNAL MEDICINE

## 2020-11-24 RX ORDER — DONEPEZIL HYDROCHLORIDE 10 MG/1
10 TABLET, FILM COATED ORAL NIGHTLY
Qty: 90 TABLET | Refills: 1 | Status: SHIPPED | OUTPATIENT
Start: 2020-11-24 | End: 2020-12-11 | Stop reason: SDUPTHER

## 2020-11-24 NOTE — PROGRESS NOTES
Chief Complaint   Patient presents with   • Hyperlipidemia   • Hypertension       Subjective   Dory Hampton is a 80 y.o. female.     Hyperlipidemia  This is a chronic problem. The problem is controlled. Recent lipid tests were reviewed and are variable. Exacerbating diseases include hypothyroidism. She has no history of diabetes or obesity. Factors aggravating her hyperlipidemia include thiazides and beta blockers. Pertinent negatives include no chest pain or shortness of breath. Current antihyperlipidemic treatment includes diet change. The current treatment provides moderate improvement of lipids. There are no compliance problems.    Hypertension  This is a chronic problem. The problem is controlled. Pertinent negatives include no blurred vision, chest pain, orthopnea, palpitations, peripheral edema, PND or shortness of breath. (She has had some dizziness) There are no associated agents to hypertension. Current antihypertension treatment includes diuretics, angiotensin blockers and beta blockers. The current treatment provides significant improvement. There are no compliance problems.    Hypothyroidism  This is a chronic problem. Progression since onset: controlled. Pertinent negatives include no chest pain or coughing. Associated symptoms comments: No changes in heat/cold tolerance, no change hair/skin.  She has had some fatigue.  Her daughter reports she sometimes stays in bed until afternoon. Treatments tried: Levothyroxine.   Memory Loss  This is a chronic problem. The current episode started more than 1 year ago. The problem occurs daily. The problem has been unchanged. Pertinent negatives include no chest pain or coughing. Treatments tried: donepezil. The treatment provided moderate relief.    Family would like to try increasing donepezil.    The following portions of the patient's history were reviewed and updated as appropriate: allergies, current medications, past family history, past medical history,  "past social history, past surgical history and problem list.    Review of Systems   Constitutional: Negative for appetite change.   HENT: Negative for nosebleeds.    Eyes: Negative for blurred vision and double vision.   Respiratory: Negative for cough and shortness of breath.    Cardiovascular: Negative for chest pain, palpitations, orthopnea, leg swelling and PND.   Neurological: Positive for dizziness (sometimes) and memory problem. Negative for syncope.         Current Outpatient Medications:   •  aspirin 81 MG EC tablet, Take 1 tablet by mouth Daily., Disp: , Rfl:   •  azelastine (ASTELIN) 0.1 % nasal spray, 2 sprays into the nostril(s) as directed by provider 2 (Two) Times a Day. Use in each nostril as directed, Disp: 1 each, Rfl: 5  •  Cyanocobalamin (VITAMIN B-12 PO), Take 1,000 mcg by mouth daily., Disp: , Rfl:   •  donepezil (ARICEPT) 10 MG tablet, Take 1 tablet by mouth Every Night., Disp: 90 tablet, Rfl: 1  •  Hydrocortisone Butyr Lipo Base (Locoid Lipocream) 0.1 % cream, Apply sparingly twice a day as needed for 7 days., Disp: 45 g, Rfl: 0  •  loratadine (Claritin) 10 MG tablet, Take 10 mg by mouth Daily., Disp: , Rfl:   •  losartan-hydrochlorothiazide (HYZAAR) 100-25 MG per tablet, TAKE 1 TABLET DAILY, Disp: 90 tablet, Rfl: 4  •  melatonin 1 MG tablet, Take  by mouth Every Night., Disp: , Rfl:   •  metoprolol tartrate (LOPRESSOR) 50 MG tablet, TAKE 1 TABLET BY MOUTH TWICE DAILY, Disp: 180 tablet, Rfl: 1  •  montelukast (SINGULAIR) 10 MG tablet, TAKE 1 TABLET BY MOUTH EVERY NIGHT, Disp: 30 tablet, Rfl: 5  •  Multiple Vitamins-Minerals (CENTRUM SILVER PO), Take 1 tablet by mouth Daily. PT HOLDING FOR SURGERY, Disp: , Rfl:   •  Pyridoxine HCl (VITAMIN B6 PO), Take 100 mg by mouth daily., Disp: , Rfl:   •  SYNTHROID 50 MCG tablet, TAKE 1 TABLET BY MOUTH DAILY, Disp: 90 tablet, Rfl: 1        Objective     /60   Pulse (!) 49   Temp 97.3 °F (36.3 °C)   Ht 152.4 cm (60\")   Wt 54.9 kg (121 lb)   LMP  " (LMP Unknown)   SpO2 99%   BMI 23.63 kg/m²     Physical Exam  Vitals signs and nursing note reviewed.   Constitutional:       General: She is not in acute distress.     Appearance: Normal appearance. She is well-developed.   Neck:      Vascular: Normal carotid pulses. No carotid bruit.   Cardiovascular:      Rate and Rhythm: Regular rhythm.      Pulses:           Carotid pulses are 2+ on the right side and 2+ on the left side.     Heart sounds: S1 normal and S2 normal. No murmur. No friction rub. No gallop.    Pulmonary:      Effort: Pulmonary effort is normal.      Breath sounds: Normal breath sounds. No wheezing, rhonchi or rales.   Chest:      Chest wall: There is no dullness to percussion.   Skin:     General: Skin is warm and dry.   Neurological:      Mental Status: She is alert.   Psychiatric:         Cognition and Memory: Memory is impaired.           Assessment/Plan   Diagnoses and all orders for this visit:    1. Essential hypertension (Primary)  -     Comprehensive Metabolic Panel  -     Lipid Panel With / Chol / HDL Ratio  -     CBC (No Diff)    2. Mixed hyperlipidemia    3. Hypothyroidism, adult  -     TSH Rfx On Abnormal To Free T4    4. Paroxysmal atrial fibrillation (CMS/HCC)    5. Fatigue, unspecified type  -     Vitamin B12 & Folate    6. Cognitive change    Other orders  -     donepezil (ARICEPT) 10 MG tablet; Take 1 tablet by mouth Every Night.  Dispense: 90 tablet; Refill: 1      Donepezil increased from 5 mgm to 10 mgm today.

## 2020-11-25 LAB
ALBUMIN SERPL-MCNC: 4.5 G/DL (ref 3.5–5.2)
ALBUMIN/GLOB SERPL: 2 G/DL
ALP SERPL-CCNC: 64 U/L (ref 39–117)
ALT SERPL-CCNC: 11 U/L (ref 1–33)
AST SERPL-CCNC: 27 U/L (ref 1–32)
BILIRUB SERPL-MCNC: 0.4 MG/DL (ref 0–1.2)
BUN SERPL-MCNC: 38 MG/DL (ref 8–23)
BUN/CREAT SERPL: 35.2 (ref 7–25)
CALCIUM SERPL-MCNC: 9.5 MG/DL (ref 8.6–10.5)
CHLORIDE SERPL-SCNC: 102 MMOL/L (ref 98–107)
CHOLEST SERPL-MCNC: 240 MG/DL (ref 0–200)
CHOLEST/HDLC SERPL: 2.73 {RATIO}
CO2 SERPL-SCNC: 33.4 MMOL/L (ref 22–29)
CREAT SERPL-MCNC: 1.08 MG/DL (ref 0.57–1)
ERYTHROCYTE [DISTWIDTH] IN BLOOD BY AUTOMATED COUNT: 16.3 % (ref 12.3–15.4)
FOLATE SERPL-MCNC: >20 NG/ML (ref 4.78–24.2)
GLOBULIN SER CALC-MCNC: 2.3 GM/DL
GLUCOSE SERPL-MCNC: 80 MG/DL (ref 65–99)
HCT VFR BLD AUTO: 33.6 % (ref 34–46.6)
HDLC SERPL-MCNC: 88 MG/DL (ref 40–60)
HGB BLD-MCNC: 11.5 G/DL (ref 12–15.9)
LDLC SERPL CALC-MCNC: 123 MG/DL (ref 0–100)
MCH RBC QN AUTO: 33.4 PG (ref 26.6–33)
MCHC RBC AUTO-ENTMCNC: 34.2 G/DL (ref 31.5–35.7)
MCV RBC AUTO: 97.7 FL (ref 79–97)
PLATELET # BLD AUTO: 208 10*3/MM3 (ref 140–450)
POTASSIUM SERPL-SCNC: 4 MMOL/L (ref 3.5–5.2)
PROT SERPL-MCNC: 6.8 G/DL (ref 6–8.5)
RBC # BLD AUTO: 3.44 10*6/MM3 (ref 3.77–5.28)
SODIUM SERPL-SCNC: 144 MMOL/L (ref 136–145)
T4 FREE SERPL-MCNC: 1.39 NG/DL (ref 0.93–1.7)
TRIGL SERPL-MCNC: 168 MG/DL (ref 0–150)
TSH SERPL DL<=0.005 MIU/L-ACNC: 5.44 UIU/ML (ref 0.27–4.2)
VIT B12 SERPL-MCNC: 1785 PG/ML (ref 211–946)
VLDLC SERPL CALC-MCNC: 29 MG/DL (ref 5–40)
WBC # BLD AUTO: 6.67 10*3/MM3 (ref 3.4–10.8)

## 2020-12-01 ENCOUNTER — OFFICE VISIT (OUTPATIENT)
Dept: CARDIOLOGY | Facility: CLINIC | Age: 80
End: 2020-12-01

## 2020-12-01 VITALS
BODY MASS INDEX: 23.48 KG/M2 | HEIGHT: 60 IN | DIASTOLIC BLOOD PRESSURE: 60 MMHG | WEIGHT: 119.6 LBS | SYSTOLIC BLOOD PRESSURE: 124 MMHG | HEART RATE: 45 BPM

## 2020-12-01 DIAGNOSIS — R00.1 SINUS BRADYCARDIA: ICD-10-CM

## 2020-12-01 DIAGNOSIS — Z95.2 S/P AVR: Primary | ICD-10-CM

## 2020-12-01 DIAGNOSIS — E03.9 HYPOTHYROIDISM, ADULT: ICD-10-CM

## 2020-12-01 DIAGNOSIS — I65.23 BILATERAL CAROTID ARTERY STENOSIS: ICD-10-CM

## 2020-12-01 DIAGNOSIS — I48.0 PAROXYSMAL ATRIAL FIBRILLATION (HCC): ICD-10-CM

## 2020-12-01 DIAGNOSIS — I10 ESSENTIAL HYPERTENSION: ICD-10-CM

## 2020-12-01 DIAGNOSIS — E78.2 MIXED HYPERLIPIDEMIA: ICD-10-CM

## 2020-12-01 PROCEDURE — 99442 PR PHYS/QHP TELEPHONE EVALUATION 11-20 MIN: CPT | Performed by: NURSE PRACTITIONER

## 2020-12-11 RX ORDER — LEVOTHYROXINE SODIUM 50 MCG
50 TABLET ORAL DAILY
Qty: 90 TABLET | Refills: 1 | Status: SHIPPED | OUTPATIENT
Start: 2020-12-11 | End: 2021-06-10

## 2020-12-11 RX ORDER — DONEPEZIL HYDROCHLORIDE 10 MG/1
10 TABLET, FILM COATED ORAL NIGHTLY
Qty: 90 TABLET | Refills: 1 | Status: SHIPPED | OUTPATIENT
Start: 2020-12-11 | End: 2021-05-28

## 2021-01-17 ENCOUNTER — TELEPHONE (OUTPATIENT)
Dept: CARDIOLOGY | Facility: CLINIC | Age: 81
End: 2021-01-17

## 2021-01-17 NOTE — TELEPHONE ENCOUNTER
On her last office visit, I elected to decrease her metoprolol to 25 mg 1 tablet daily because of bradycarida. I have recommended that she monitor her pulse and BP at home. Please call her for  BP and HR readings.

## 2021-01-18 ENCOUNTER — TELEPHONE (OUTPATIENT)
Dept: INTERNAL MEDICINE | Facility: CLINIC | Age: 81
End: 2021-01-18

## 2021-01-18 NOTE — TELEPHONE ENCOUNTER
Caller: Toñito Walden    Relationship to patient: Emergency Contact    Best call back number: [phone]     Concerns or Questions if Applicable:  MS. WALDEN SON TOÑITO WOULD LIKE TO KNOW IF SHE WOULD QUALIFY FOR THE COVID VACCINE DUE TO ANY HEALTH ISSUES, OR ALLERGIES.  SHE CURRENTLY DOES NOT TAKE THE FLU SHOT BECAUSE OF ALLERGIES.

## 2021-01-18 NOTE — TELEPHONE ENCOUNTER
Patient daughter calling back wanting to know if her mom needs to stay on The Toprol 25mg or if It needs to be increased?    She has taken her Heart rate for the past week and it has ranged as follows: 58, 62, 64, 65, 62, 62, 58, 62, 65, 64, 45 & 64     Please advise.

## 2021-01-18 NOTE — TELEPHONE ENCOUNTER
She should be able to get the vaccine.  She will have the same chance of side effects as other patients - sore arm, myalgias, fever, fatigue.

## 2021-01-18 NOTE — TELEPHONE ENCOUNTER
Heart rates are excellent.  Continue metoprolol 25 mg 1 tablet daily.  Please call daughter. Thank you

## 2021-01-21 NOTE — TELEPHONE ENCOUNTER
Spoke with the daughter and she is aware to continue taking the mettoprlol 25 mg daily as advised.

## 2021-02-06 ENCOUNTER — TELEPHONE (OUTPATIENT)
Dept: INTERNAL MEDICINE | Facility: CLINIC | Age: 81
End: 2021-02-06

## 2021-02-06 NOTE — TELEPHONE ENCOUNTER
Patient's daughter (Elise) called the after hours line to discuss her mother's eligibility for the covid vaccine due to an existing egg allergy.  Her number is [phone].    I explained that this line is for urgent matters and the patient was not ill and still on the waiting list for the vaccine.  I let her know that I would forward the information to her provider for advisement to be handled during normal clinic hours.

## 2021-02-08 NOTE — TELEPHONE ENCOUNTER
Please let her daughter know that the COVID vaccines do not have eggs in them.  It is OK for Ms. Hampton to have the COVID vaccine.

## 2021-03-11 RX ORDER — MONTELUKAST SODIUM 10 MG/1
10 TABLET ORAL NIGHTLY
Qty: 30 TABLET | Refills: 5 | Status: SHIPPED | OUTPATIENT
Start: 2021-03-11 | End: 2021-05-11

## 2021-03-23 ENCOUNTER — OFFICE VISIT (OUTPATIENT)
Dept: INTERNAL MEDICINE | Facility: CLINIC | Age: 81
End: 2021-03-23

## 2021-03-23 VITALS
TEMPERATURE: 97.4 F | WEIGHT: 116 LBS | HEIGHT: 60 IN | DIASTOLIC BLOOD PRESSURE: 68 MMHG | BODY MASS INDEX: 22.78 KG/M2 | HEART RATE: 59 BPM | OXYGEN SATURATION: 94 % | SYSTOLIC BLOOD PRESSURE: 115 MMHG

## 2021-03-23 DIAGNOSIS — I10 ESSENTIAL HYPERTENSION: ICD-10-CM

## 2021-03-23 DIAGNOSIS — E03.9 HYPOTHYROIDISM, ADULT: ICD-10-CM

## 2021-03-23 DIAGNOSIS — E78.2 MIXED HYPERLIPIDEMIA: Primary | ICD-10-CM

## 2021-03-23 LAB
ALBUMIN SERPL-MCNC: 4.5 G/DL (ref 3.5–5.2)
ALBUMIN/GLOB SERPL: 1.9 G/DL
ALP SERPL-CCNC: 63 U/L (ref 39–117)
ALT SERPL-CCNC: 9 U/L (ref 1–33)
AST SERPL-CCNC: 24 U/L (ref 1–32)
BACTERIA UR QL AUTO: ABNORMAL /HPF
BILIRUB SERPL-MCNC: 0.6 MG/DL (ref 0–1.2)
BILIRUB UR QL STRIP: NEGATIVE
BUN SERPL-MCNC: 29 MG/DL (ref 8–23)
BUN/CREAT SERPL: 21.6 (ref 7–25)
CALCIUM SERPL-MCNC: 9.7 MG/DL (ref 8.6–10.5)
CHLORIDE SERPL-SCNC: 101 MMOL/L (ref 98–107)
CHOLEST SERPL-MCNC: 229 MG/DL (ref 0–200)
CHOLEST/HDLC SERPL: 3.18 {RATIO}
CLARITY UR: CLEAR
CO2 SERPL-SCNC: 33.6 MMOL/L (ref 22–29)
COLOR UR: YELLOW
CREAT SERPL-MCNC: 1.34 MG/DL (ref 0.57–1)
ERYTHROCYTE [DISTWIDTH] IN BLOOD BY AUTOMATED COUNT: 16.3 % (ref 12.3–15.4)
GLOBULIN SER CALC-MCNC: 2.4 GM/DL
GLUCOSE SERPL-MCNC: 97 MG/DL (ref 65–99)
GLUCOSE UR STRIP-MCNC: NEGATIVE MG/DL
HCT VFR BLD AUTO: 35.2 % (ref 34–46.6)
HDLC SERPL-MCNC: 72 MG/DL (ref 40–60)
HGB BLD-MCNC: 12.2 G/DL (ref 12–15.9)
HGB UR QL STRIP.AUTO: ABNORMAL
HYALINE CASTS UR QL AUTO: ABNORMAL /LPF
KETONES UR QL STRIP: ABNORMAL
LDLC SERPL CALC-MCNC: 136 MG/DL (ref 0–100)
LEUKOCYTE ESTERASE UR QL STRIP.AUTO: NEGATIVE
MCH RBC QN AUTO: 35.1 PG (ref 26.6–33)
MCHC RBC AUTO-ENTMCNC: 34.7 G/DL (ref 31.5–35.7)
MCV RBC AUTO: 101.1 FL (ref 79–97)
MUCOUS THREADS URNS QL MICRO: ABNORMAL /HPF
NITRITE UR QL STRIP: NEGATIVE
PH UR STRIP.AUTO: 6 [PH] (ref 5–8)
PLATELET # BLD AUTO: 215 10*3/MM3 (ref 140–450)
POTASSIUM SERPL-SCNC: 4 MMOL/L (ref 3.5–5.2)
PROT SERPL-MCNC: 6.9 G/DL (ref 6–8.5)
PROT UR QL STRIP: NEGATIVE
RBC # BLD AUTO: 3.48 10*6/MM3 (ref 3.77–5.28)
RBC # UR: ABNORMAL /HPF
REF LAB TEST METHOD: ABNORMAL
SODIUM SERPL-SCNC: 144 MMOL/L (ref 136–145)
SP GR UR STRIP: 1.02 (ref 1–1.03)
SQUAMOUS #/AREA URNS HPF: ABNORMAL /HPF
T4 FREE SERPL-MCNC: 1.53 NG/DL (ref 0.93–1.7)
TRIGL SERPL-MCNC: 122 MG/DL (ref 0–150)
TSH SERPL DL<=0.005 MIU/L-ACNC: 3.33 UIU/ML (ref 0.27–4.2)
UROBILINOGEN UR QL STRIP: ABNORMAL
VLDLC SERPL CALC-MCNC: 21 MG/DL (ref 5–40)
WBC # BLD AUTO: 6.42 10*3/MM3 (ref 3.4–10.8)
WBC UR QL AUTO: ABNORMAL /HPF

## 2021-03-23 PROCEDURE — 99214 OFFICE O/P EST MOD 30 MIN: CPT | Performed by: INTERNAL MEDICINE

## 2021-03-23 PROCEDURE — 81001 URINALYSIS AUTO W/SCOPE: CPT | Performed by: INTERNAL MEDICINE

## 2021-03-23 RX ORDER — METOPROLOL SUCCINATE 25 MG/1
25 TABLET, EXTENDED RELEASE ORAL DAILY
COMMUNITY
End: 2021-05-11

## 2021-03-23 NOTE — PROGRESS NOTES
Chief Complaint   Patient presents with   • Hyperlipidemia   • Hypothyroidism       Subjective   Dory Hampton is a 80 y.o. female.     Hyperlipidemia  This is a chronic problem. Exacerbating diseases include hypothyroidism. Pertinent negatives include no chest pain or shortness of breath.   Hypothyroidism  Associated symptoms include fatigue. Pertinent negatives include no chest pain, coughing or fever.      Hyperlipidemia:    This is a chronic problem.  Her most recent lipid panel showed:  Lab Results   Component Value Date    CHOL 182 07/30/2019    CHLPL 240 (H) 11/24/2020    TRIG 168 (H) 11/24/2020    HDL 88 (H) 11/24/2020     (H) 11/24/2020     Current treatment: diet.     Hypothyroidism:  This is a chronic problem.  Current treatment: Levothyroxine.  By report, there is good compliance with treatment, good tolerance of treatment.  Lab Results   Component Value Date    TSH 5.440 (H) 11/24/2020     She always feels cold.  Energy is poor.            The following portions of the patient's history were reviewed and updated as appropriate: allergies, current medications, past family history, past medical history, past social history, past surgical history and problem list.    Review of Systems   Constitutional: Positive for fatigue. Negative for appetite change and fever.   Respiratory: Negative for cough and shortness of breath.    Cardiovascular: Negative for chest pain, palpitations and leg swelling.   Endocrine: Positive for cold intolerance. Negative for heat intolerance.   Genitourinary: Positive for urinary incontinence.           Current Outpatient Medications:   •  aspirin 81 MG EC tablet, Take 1 tablet by mouth Daily., Disp: , Rfl:   •  azelastine (ASTELIN) 0.1 % nasal spray, 2 sprays into the nostril(s) as directed by provider 2 (Two) Times a Day. Use in each nostril as directed, Disp: 1 each, Rfl: 5  •  Cyanocobalamin (VITAMIN B-12 PO), Take 1,000 mcg by mouth daily., Disp: , Rfl:   •  donepezil  "(Aricept) 10 MG tablet, Take 1 tablet by mouth Every Night., Disp: 90 tablet, Rfl: 1  •  loratadine (Claritin) 10 MG tablet, Take 10 mg by mouth Daily., Disp: , Rfl:   •  losartan-hydrochlorothiazide (HYZAAR) 100-25 MG per tablet, TAKE 1 TABLET DAILY, Disp: 90 tablet, Rfl: 4  •  melatonin 1 MG tablet, Take  by mouth Every Night., Disp: , Rfl:   •  metoprolol succinate XL (TOPROL-XL) 25 MG 24 hr tablet, Take 25 mg by mouth Daily., Disp: , Rfl:   •  montelukast (SINGULAIR) 10 MG tablet, TAKE 1 TABLET BY MOUTH EVERY NIGHT, Disp: 30 tablet, Rfl: 5  •  Multiple Vitamins-Minerals (CENTRUM SILVER PO), Take 1 tablet by mouth Daily. PT HOLDING FOR SURGERY, Disp: , Rfl:   •  Pyridoxine HCl (VITAMIN B6 PO), Take 100 mg by mouth daily., Disp: , Rfl:   •  Synthroid 50 MCG tablet, TAKE 1 TABLET BY MOUTH DAILY, Disp: 90 tablet, Rfl: 1        Objective     /68   Pulse 59   Temp 97.4 °F (36.3 °C)   Ht 152.4 cm (60\")   Wt 52.6 kg (116 lb)   LMP  (LMP Unknown)   SpO2 94%   BMI 22.65 kg/m²     Physical Exam  Vitals and nursing note reviewed.   Constitutional:       General: She is not in acute distress.     Appearance: She is well-developed.   Neck:      Vascular: Normal carotid pulses. No carotid bruit.   Cardiovascular:      Rate and Rhythm: Regular rhythm.      Pulses:           Carotid pulses are 2+ on the right side and 2+ on the left side.     Heart sounds: S1 normal and S2 normal. No murmur heard.   No friction rub. No gallop.    Pulmonary:      Effort: Pulmonary effort is normal.      Breath sounds: Normal breath sounds. No wheezing, rhonchi or rales.   Skin:     General: Skin is warm and dry.   Neurological:      Mental Status: She is alert and oriented to person, place, and time.           Assessment/Plan   Diagnoses and all orders for this visit:    1. Mixed hyperlipidemia (Primary)  -     Lipid Panel With / Chol / HDL Ratio    2. Essential hypertension  -     Comprehensive Metabolic Panel  -     CBC (No " Diff)  -     Urinalysis With Microscopic If Indicated (No Culture) - Urine, Clean Catch  -     Urinalysis, Microscopic Only - Urine, Clean Catch    3. Hypothyroidism, adult  -     T4, Free  -     TSH    Discussed last TSH which is consistent with hypothyroidism.  Her free T4 was normal.  Will recheck levels today.  Family reports urinary incontinence.  She denies urgency, frequency, dysuria.  Urinalysis done today.

## 2021-04-12 RX ORDER — METOPROLOL TARTRATE 50 MG/1
TABLET, FILM COATED ORAL
Qty: 180 TABLET | Refills: 1 | Status: SHIPPED | OUTPATIENT
Start: 2021-04-12 | End: 2021-05-11

## 2021-04-28 RX ORDER — LOSARTAN POTASSIUM AND HYDROCHLOROTHIAZIDE 25; 100 MG/1; MG/1
TABLET ORAL
Qty: 90 TABLET | Refills: 3 | Status: SHIPPED | OUTPATIENT
Start: 2021-04-28 | End: 2021-11-04 | Stop reason: HOSPADM

## 2021-05-11 ENCOUNTER — OFFICE VISIT (OUTPATIENT)
Dept: CARDIOLOGY | Facility: CLINIC | Age: 81
End: 2021-05-11

## 2021-05-11 VITALS
WEIGHT: 116.2 LBS | HEIGHT: 60 IN | DIASTOLIC BLOOD PRESSURE: 70 MMHG | BODY MASS INDEX: 22.81 KG/M2 | HEART RATE: 51 BPM | SYSTOLIC BLOOD PRESSURE: 120 MMHG

## 2021-05-11 DIAGNOSIS — Z95.2 S/P AVR: Primary | ICD-10-CM

## 2021-05-11 DIAGNOSIS — I10 ESSENTIAL HYPERTENSION: ICD-10-CM

## 2021-05-11 DIAGNOSIS — E78.2 MIXED HYPERLIPIDEMIA: ICD-10-CM

## 2021-05-11 DIAGNOSIS — I48.0 PAROXYSMAL ATRIAL FIBRILLATION (HCC): ICD-10-CM

## 2021-05-11 DIAGNOSIS — I65.23 BILATERAL CAROTID ARTERY STENOSIS: ICD-10-CM

## 2021-05-11 PROCEDURE — 99214 OFFICE O/P EST MOD 30 MIN: CPT | Performed by: INTERNAL MEDICINE

## 2021-05-11 PROCEDURE — 93000 ELECTROCARDIOGRAM COMPLETE: CPT | Performed by: INTERNAL MEDICINE

## 2021-05-11 NOTE — PROGRESS NOTES
Date of Office Visit: 2021  Encounter Provider: Zaria Bryan MD  Place of Service: [facility] CARDIOLOGY  Patient Name: Dory Hampton  :1940      Patient ID:  Dory Hampton is a 80 y.o. female is here for  followup for s/p AVR.         History of Present Illness    She had an echocardiogram which was performed on 2016 for a murmur. Her ejection fraction was 68% with moderate concentric left ventricular hypertrophy, normal segmental wall motion, grade II diastolic dysfunction, moderate aortic insufficiency and severe aortic stenosis. Her mean gradient was 64 mmHg with a maximum pressure gradient of 102. Her aortic valve area was calculated at 0.51. Her maximum velocity was 4 meters per second.       She has had anemia. In 2015 she presented to the emergency department at [facility] with a syncopal episode and was found to have a hemoglobin of 5. She had an extensive gastrointestinal evaluation including upper and lower endoscopy, and she did the capsule camera, and no gastrointestinal bleeding site was found. She then was transfused at that point. Then in 2016 she required a transfusion again. She was referred to Dr. Natalia Fu from [facility] hematology/oncology. Dr. Fu saw the patient and performed another transfusion on her in 2016 for a hemoglobin on 2016 that was 8.4. She said that when she was transfused in 2015 she felt good and her breathing improved, and she was not as fatigued. When she received her transfusion in 2016 she noticed no improvement in her breathing and she was still very fatigued.      She is treated for hypertension, hyperlipidemia and hypothyroidism and these have been well controlled. She quit smoking in . Her maternal grandmother had heart failure and myocardial infarctions.       The patient is  and lives with her  who has post herpetic neuralgia but otherwise is healthy. She has  three healthy children.       She had severe symptomatic aortic stenosis.  She underwent a cardiac catheterization on 08/25/2016 and this revealed 10% mid left main stenosis, normal left circumflex, 50% mid left anterior descending artery stenosis, 30% mid RCA stenosis and severe aortic valvular stenosis. At that time she was given a referral to cardiothoracic surgery. They ordered bilateral carotid duplex studies which were obtained on 12/15/2016 and revealed mild bilateral internal carotid artery stenosis. On 12/16/2016 she underwent surgery and received a mini-sternotomy and had a 21 mm Magna pericardial prosthetic aortic valve replacement. She was in the hospital from 12/15/2016 to 12/23/2016. During that time she did have postoperative atrial fibrillation with rapid ventricular response and bilateral pleural effusions. She also did have some anemia requiring a transfusion prior to her surgery. At one point, there was talk about doing a right thoracentesis but then her pleural effusion resolved with diuretic therapy. Coumadin was administered because of atrial fibrillation postoperatively.       I stopped her amiodarone at her visit in early 03/2017.  She had a Holter monitor done after this to make sure that she was not having any atrial fibrillation and this was normal.  her warfarin was discontinued.   She has had no documented atrial fibrillation since surgery.     Carotid duplex study on 8/12/2019 showed mild right internal carotid artery stenosis and moderate left internal carotid artery stenosis.  She had a normal Holter monitor done 8/12/2019.  Echocardiogram done 5/4/2020 shows ejection fraction 63% with grade 1 diastolic dysfunction, mild concentric left ventricle hypertrophy, mild left atrial Eduardo, grade 1 diastolic dysfunction, normal bioprosthetic aortic valve.  Labs on 3/23/21 show normal TSH and free T4, creatinine 1.34, otherwise unremarkable CMP, total cholesterol 229, HDL 72, ,  triglycerides 122, normal CBC.    She has been dizzy when she walks but she is had no syncope or falls.  Her skin is very dry.  She has lost weight.  Her appetite has dropped somewhat.  She really does not want to eat a lot.  She does live in a condo by herself around the corner from her daughter.  She is taking her medications as directed without difficulty.    Past Medical History:   Diagnosis Date   • Abnormal electrocardiogram    • Anemia    • Aortic stenosis     severe; s/p repair   • Asthma     HISTORY   • Bilateral carotid artery stenosis 12/1/2020   • Bradycardia    • Coronary artery disease    • Diastolic dysfunction     grade II   • Environmental allergies    • Female stress incontinence    • Hemolytic anemia, mechanical (CMS/[facility]) 8/8/2016   • History of diverticulosis    • History of transfusion    • Hyperlipidemia    • Hypertension    • Hypokalemia    • Hypothyroid    • Iron deficiency anemia     hemolytic anemia   • Iron deficiency anemia due to chronic blood loss 8/8/2016   • Left ventricular hypertrophy    • Murmur    • PAF (paroxysmal atrial fibrillation) (CMS/[facility])     with RVR   • Pleural effusion     bilateral, post op   • Psoriasis    • Tendinitis    • Thoracic aorta atherosclerosis (CMS/[facility])          Past Surgical History:   Procedure Laterality Date   • AORTIC VALVE REPAIR/REPLACEMENT N/A 12/16/2016    Procedure: BHARGAVI MINI STERNOTOMY AORTIC VALVE REPLACEMENT;  Surgeon: Robin Jones MD;  Location: [facility] OR;  Service:    • APPENDECTOMY  06/1972   • CAPSULE ENDOSCOPY  2015   • CARDIAC CATHETERIZATION N/A 8/25/2016    Procedure: Coronary angiography;  Surgeon: Lulú Hooper MD;  Location: [facility] CATH INVASIVE LOCATION;  Service:    • CARDIAC CATHETERIZATION N/A 8/25/2016    Procedure: Right Heart Cath;  Surgeon: Lulú Hooper MD;  Location: [facility] CATH INVASIVE LOCATION;  Service:    • CATARACT EXTRACTION Bilateral     Left 12/10/13, Right 12/17/2013   • CATARACT EXTRACTION     •  COLONOSCOPY  06/22/2015    Dr. Mohamud   • DENTAL PROCEDURE  2011    Implants   • ENDOSCOPY  06/22/2015    Dr. Mohamud   • ENTEROSCOPY SMALL BOWEL N/A 11/21/2016    Procedure: ENTEROSCOPY SMALL BOWEL WITH APC CAUTERY;  Surgeon: Tj Torres MD;  Location: [facility] ENDOSCOPY;  Service:    • PELVIC LAPAROSCOPY     • STERNOTOMY      mini   • TONSILLECTOMY     • TUBAL ABDOMINAL LIGATION         Current Outpatient Medications on File Prior to Visit   Medication Sig Dispense Refill   • aspirin 81 MG EC tablet Take 1 tablet by mouth Daily.     • Cyanocobalamin (VITAMIN B-12 PO) Take 1,000 mcg by mouth daily.     • donepezil (Aricept) 10 MG tablet Take 1 tablet by mouth Every Night. 90 tablet 1   • loratadine (Claritin) 10 MG tablet Take 10 mg by mouth Daily.     • losartan-hydrochlorothiazide (HYZAAR) 100-25 MG per tablet TAKE 1 TABLET DAILY 90 tablet 3   • metoprolol succinate XL (TOPROL-XL) 25 MG 24 hr tablet Take 25 mg by mouth Daily.     • Multiple Vitamins-Minerals (CENTRUM SILVER PO) Take 1 tablet by mouth Daily. PT HOLDING FOR SURGERY     • Pyridoxine HCl (VITAMIN B6 PO) Take 100 mg by mouth daily.     • Synthroid 50 MCG tablet TAKE 1 TABLET BY MOUTH DAILY 90 tablet 1   • [DISCONTINUED] azelastine (ASTELIN) 0.1 % nasal spray 2 sprays into the nostril(s) as directed by provider 2 (Two) Times a Day. Use in each nostril as directed 1 each 5   • [DISCONTINUED] melatonin 1 MG tablet Take  by mouth Every Night.     • [DISCONTINUED] metoprolol tartrate (LOPRESSOR) 50 MG tablet TAKE 1 TABLET BY MOUTH TWICE DAILY 180 tablet 1   • [DISCONTINUED] montelukast (SINGULAIR) 10 MG tablet TAKE 1 TABLET BY MOUTH EVERY NIGHT 30 tablet 5     No current facility-administered medications on file prior to visit.       Social History     Socioeconomic History   • Marital status:      Spouse name: Kody   • Number of children: Not on file   • Years of education: College   • Highest education level: Not on file   Tobacco Use   •  "Smoking status: Former Smoker     Packs/day: 1.00     Years: 20.00     Pack years: 20.00     Types: Cigarettes     Quit date:      Years since quittin.3   • Smokeless tobacco: Never Used   Substance and Sexual Activity   • Alcohol use: Yes     Comment: Occ///caffeine use: 1 cup daily   • Drug use: No   • Sexual activity: Never           ROS    Procedures    ECG 12 Lead    Date/Time: 2021 3:34 PM  Performed by: Zaria Bryan MD  Authorized by: Zaria Bryan MD   Comparison: compared with previous ECG   Similar to previous ECG  Rhythm: sinus rhythm    Clinical impression: normal ECG                Objective:      Vitals:    21 1509   BP: 120/70   BP Location: Left arm   Pulse: 51   Weight: 52.7 kg (116 lb 3.2 oz)   Height: 152.4 cm (60\")     Body mass index is 22.69 kg/m².    Vitals reviewed.   Constitutional:       General: Not in acute distress.     Appearance: Well-developed. Not diaphoretic.   Eyes:      General: No scleral icterus.     Conjunctiva/sclera: Conjunctivae normal.   HENT:      Head: Normocephalic and atraumatic.   Neck:      Thyroid: No thyromegaly.      Vascular: No carotid bruit or JVD.      Lymphadenopathy: No cervical adenopathy.   Pulmonary:      Effort: Pulmonary effort is normal. No respiratory distress.      Breath sounds: Normal breath sounds. No wheezing. No rhonchi. No rales.   Chest:      Chest wall: Not tender to palpatation.   Cardiovascular:      Normal rate. Regular rhythm.      Murmurs: There is no murmur.      No gallop.   Pulses:     Intact distal pulses.   Edema:     Peripheral edema absent.   Abdominal:      General: Bowel sounds are normal. There is no distension or abdominal bruit.      Palpations: Abdomen is soft. There is no abdominal mass.      Tenderness: There is no abdominal tenderness.   Musculoskeletal:         General: No deformity.      Extremities: No clubbing present.     Cervical back: Neck supple. Skin:     General: Skin is warm " and dry. There is no cyanosis.      Coloration: Skin is not pale.      Findings: No rash.   Neurological:      Mental Status: Alert and oriented to person, place, and time.      Cranial Nerves: No cranial nerve deficit.   Psychiatric:         Judgment: Judgment normal.         Lab Review:       Assessment:      Diagnosis Plan   1. S/P AVR     2. Mixed hyperlipidemia     3. Essential hypertension     4. Paroxysmal atrial fibrillation (CMS/HCC)     5. Bilateral carotid artery stenosis       1. Severe aortic stenosis status post tissue-type aortic valve replacement. At this time she is doing well.   2. Atrial fibrillation after her aortic valve replacement.  Holter monitor 3/2017 shows no a fib and no a fib documented in cardiac rehab, off warfarin.   4. Mild coronary artery disease.   5. History of anemia.   6. HTN, well controlled.     Plan:       Stop metoprolol due to bradycardia.  Advised increasing protein consumption and using lotion on her skin because her skin is very dry.  See Starr in 3 months.  She may need to stop her hydrochlorothiazide at that time if her blood pressure still on the lower side.

## 2021-05-28 RX ORDER — DONEPEZIL HYDROCHLORIDE 10 MG/1
10 TABLET, FILM COATED ORAL NIGHTLY
Qty: 90 TABLET | Refills: 0 | Status: SHIPPED | OUTPATIENT
Start: 2021-05-28

## 2021-06-10 RX ORDER — LEVOTHYROXINE SODIUM 50 MCG
50 TABLET ORAL DAILY
Qty: 90 TABLET | Refills: 1 | Status: SHIPPED | OUTPATIENT
Start: 2021-06-10

## 2021-07-09 ENCOUNTER — TELEPHONE (OUTPATIENT)
Dept: INTERNAL MEDICINE | Facility: CLINIC | Age: 81
End: 2021-07-09

## 2021-07-09 NOTE — TELEPHONE ENCOUNTER
Caller: Sara Wolf    Relationship: Emergency Contact    Best call back number:[phone]     What was the call regarding: PATIENTS DAUGHTER CALLED AND STATED THE FAMILY IS STARTING TO WORRY ABOUT PATIENTS DEMENTIA AND WOULD LIKE TO KNOW IF THERE IS ANY WAY TO DO A CONSULT WITH PATIENT AND CHILDREN TO DISCUSS A PLAN      Do you require a callback: YES

## 2021-07-13 NOTE — TELEPHONE ENCOUNTER
Left very detailed message for Mrs Wolf, patient has appointment scheduled for 08/17/2021, notified her at that time the family may attend to discuss plan of care for patient dementia issues

## 2021-08-16 RX ORDER — AZELASTINE 1 MG/ML
SPRAY, METERED NASAL
Qty: 30 ML | Refills: 1 | Status: SHIPPED | OUTPATIENT
Start: 2021-08-16

## 2021-08-18 ENCOUNTER — HOME HEALTH ADMISSION (OUTPATIENT)
Dept: HOME HEALTH SERVICES | Facility: HOME HEALTHCARE | Age: 81
End: 2021-08-18

## 2021-08-18 ENCOUNTER — OFFICE VISIT (OUTPATIENT)
Dept: INTERNAL MEDICINE | Facility: CLINIC | Age: 81
End: 2021-08-18

## 2021-08-18 VITALS
OXYGEN SATURATION: 98 % | BODY MASS INDEX: 22.65 KG/M2 | HEART RATE: 69 BPM | TEMPERATURE: 97.7 F | DIASTOLIC BLOOD PRESSURE: 62 MMHG | HEIGHT: 60 IN | WEIGHT: 115.4 LBS | SYSTOLIC BLOOD PRESSURE: 117 MMHG

## 2021-08-18 DIAGNOSIS — R26.81 GAIT INSTABILITY: ICD-10-CM

## 2021-08-18 DIAGNOSIS — Z76.89 ENCOUNTER TO ESTABLISH CARE: Primary | ICD-10-CM

## 2021-08-18 DIAGNOSIS — E03.9 HYPOTHYROIDISM, ADULT: ICD-10-CM

## 2021-08-18 DIAGNOSIS — R53.83 FATIGUE, UNSPECIFIED TYPE: ICD-10-CM

## 2021-08-18 LAB
ALBUMIN SERPL-MCNC: 4.6 G/DL (ref 3.5–5.2)
ALBUMIN/GLOB SERPL: 1.9 G/DL
ALP SERPL-CCNC: 64 U/L (ref 39–117)
ALT SERPL-CCNC: 9 U/L (ref 1–33)
AST SERPL-CCNC: 26 U/L (ref 1–32)
BILIRUB SERPL-MCNC: 0.6 MG/DL (ref 0–1.2)
BUN SERPL-MCNC: 31 MG/DL (ref 8–23)
BUN/CREAT SERPL: 25.8 (ref 7–25)
CALCIUM SERPL-MCNC: 10.2 MG/DL (ref 8.6–10.5)
CHLORIDE SERPL-SCNC: 100 MMOL/L (ref 98–107)
CO2 SERPL-SCNC: 33.1 MMOL/L (ref 22–29)
CREAT SERPL-MCNC: 1.2 MG/DL (ref 0.57–1)
ERYTHROCYTE [DISTWIDTH] IN BLOOD BY AUTOMATED COUNT: 15.4 % (ref 12.3–15.4)
GLOBULIN SER CALC-MCNC: 2.4 GM/DL
GLUCOSE SERPL-MCNC: 103 MG/DL (ref 65–99)
HCT VFR BLD AUTO: 33.3 % (ref 34–46.6)
HGB BLD-MCNC: 11.7 G/DL (ref 12–15.9)
MCH RBC QN AUTO: 34.4 PG (ref 26.6–33)
MCHC RBC AUTO-ENTMCNC: 35.1 G/DL (ref 31.5–35.7)
MCV RBC AUTO: 97.9 FL (ref 79–97)
PLATELET # BLD AUTO: 210 10*3/MM3 (ref 140–450)
POTASSIUM SERPL-SCNC: 3.6 MMOL/L (ref 3.5–5.2)
PROT SERPL-MCNC: 7 G/DL (ref 6–8.5)
RBC # BLD AUTO: 3.4 10*6/MM3 (ref 3.77–5.28)
SODIUM SERPL-SCNC: 141 MMOL/L (ref 136–145)
TSH SERPL DL<=0.005 MIU/L-ACNC: 3.12 UIU/ML (ref 0.27–4.2)
WBC # BLD AUTO: 7.04 10*3/MM3 (ref 3.4–10.8)

## 2021-08-18 PROCEDURE — 99214 OFFICE O/P EST MOD 30 MIN: CPT | Performed by: FAMILY MEDICINE

## 2021-08-18 NOTE — PROGRESS NOTES
"Chief Complaint  Establish Care (transfer from Dr. Billy), sore toe (right foot toe hurts ), Fatigue (weak on right side of the body ), and Balance Issues    Subjective    History of Present Illness {CC  Problem List  Visit  Diagnosis   Encounters  Notes  Medications  Labs  Result Review Imaging  Media :23}     Dory Hampton presents to [facility] to establish care.  Pt is new to me, previously seen by Dr. Billy.   Per her daughter present with her today she has increase fatigue and cold a lot.  She has been taking all her medication daily, however thyroid medication with multivitamins.    She recently stop her metoprolol per cardiology, due to low HR at home.    She has a good appetite.       Per daughter she has notice some balance issues. A couple of falls around the house. She tries to stay close by when they are out about walking.      History of Present Illness     Objective     Vital Signs:   /62 (BP Location: Left arm, Patient Position: Sitting, Cuff Size: Adult)   Pulse 69   Temp 97.7 °F (36.5 °C)   Ht 152.4 cm (60\")   Wt 52.3 kg (115 lb 6.4 oz)   SpO2 98%   BMI 22.54 kg/m²      Physical Exam  Vitals reviewed.   HENT:      Head: Normocephalic.   Eyes:      Conjunctiva/sclera: Conjunctivae normal.   Cardiovascular:      Rate and Rhythm: Regular rhythm.      Heart sounds: Normal heart sounds.   Pulmonary:      Effort: Pulmonary effort is normal. No respiratory distress.      Breath sounds: No wheezing.   Abdominal:      General: Bowel sounds are normal.      Palpations: Abdomen is soft.   Musculoskeletal:      Right lower leg: No edema.      Left lower leg: No edema.   Skin:     Comments: Third toe R foot no bruising, rash or lesion toe nails trimmed   Neurological:      Mental Status: She is alert.   Psychiatric:         Mood and Affect: Mood normal.        Result Review  Data Reviewed:{ Labs  Result Review  Imaging  Med Tab  Media :23}   The following " data was reviewed by: Shantel Michelle MD on 08/18/2021  Lab Results - Last 18 Months   Lab Units 03/23/21  1504 11/24/20  1530 07/29/20  1431   GLUCOSE mg/dL 97 80 88   BUN mg/dL 29* 38* 31*   CREATININE mg/dL 1.34* 1.08* 1.15*   EGFR IF NONAFRICN AM mL/min/1.73 38* 49* 46*   EGFR IF AFRICN AM mL/min/1.73 46* 59* 55*   SODIUM mmol/L 144 144 142   POTASSIUM mmol/L 4.0 4.0 4.1   CHLORIDE mmol/L 101 102 101   CALCIUM mg/dL 9.7 9.5 9.6   ALBUMIN g/dL 4.50 4.50 4.60   BILIRUBIN mg/dL 0.6 0.4 0.4   ALK PHOS U/L 63 64 59   AST (SGOT) U/L 24 27 28   ALT (SGPT) U/L 9 11 13   CHOLESTEROL mg/dL 229* 240* 211*   TRIGLYCERIDES mg/dL 122 168* 172*   HDL CHOL mg/dL 72* 88* 71*   VLDL CHOL mg/dL  --   --  34.4   VLDL CHOLESTEROL JAZMIN mg/dL 21 29  --    LDL CHOL mg/dL 136* 123* 106*   WBC 10*3/mm3 6.42 6.67  --    RBC 10*6/mm3 3.48* 3.44*  --    HEMATOCRIT % 35.2 33.6*  --    MCV fL 101.1* 97.7*  --    MCH pg 35.1* 33.4*  --    TSH uIU/mL 3.330 5.440*  --    FREE T4 ng/dL 1.53 1.39  --      Previous pcp note, cardiology        Current Outpatient Medications:   •  aspirin 81 MG EC tablet, Take 1 tablet by mouth Daily., Disp: , Rfl:   •  azelastine (ASTELIN) 0.1 % nasal spray, INSTILL 2 SPRAYS INTO EACH NOSTRIL TWICE DAILY, Disp: 30 mL, Rfl: 1  •  Cyanocobalamin (VITAMIN B-12 PO), Take 1,000 mcg by mouth daily., Disp: , Rfl:   •  donepezil (ARICEPT) 10 MG tablet, TAKE 1 TABLET BY MOUTH EVERY NIGHT, Disp: 90 tablet, Rfl: 0  •  loratadine (Claritin) 10 MG tablet, Take 10 mg by mouth Daily., Disp: , Rfl:   •  losartan-hydrochlorothiazide (HYZAAR) 100-25 MG per tablet, TAKE 1 TABLET DAILY, Disp: 90 tablet, Rfl: 3  •  Multiple Vitamins-Minerals (CENTRUM SILVER PO), Take 1 tablet by mouth Daily. PT HOLDING FOR SURGERY, Disp: , Rfl:   •  Pyridoxine HCl (VITAMIN B6 PO), Take 100 mg by mouth daily., Disp: , Rfl:   •  Synthroid 50 MCG tablet, TAKE 1 TABLET BY MOUTH DAILY, Disp: 90 tablet, Rfl: 1     Assessment and Plan {CC Problem List  Visit  Diagnosis  ROS  Review (Popup)  Health Maintenance  Quality  BestPractice  Medications  SmartSets  SnapShot Encounters  Media :23}   Problem List Items Addressed This Visit        Endocrine and Metabolic    Hypothyroidism, adult    Relevant Orders    TSH      Other Visit Diagnoses     Encounter to establish care    -  Primary    Fatigue, unspecified type        Relevant Orders    TSH    CBC (No Diff)    Comprehensive metabolic panel    Ambulatory Referral to Home Health    Gait instability        Relevant Orders    Ambulatory Referral to Home Health        Concern for fall risk at home. Fatigue and gait instability. Order placed for home health PT/OT evaluation.     Toe pain- no lesion or obvious injury. Advise may see a podiatry for further evaluation.         Follow Up   Return in about 3 months (around 11/18/2021) for Recheck.  Patient was given instructions and counseling regarding her condition or for health maintenance advice. Please see specific information pulled into the AVS if appropriate.    EpicAct:[email or web address]_ORDERS,RunParams:STARTUPTYPE=FOLLOW    [email or web address]_DISCHARGE

## 2021-09-01 ENCOUNTER — TELEPHONE (OUTPATIENT)
Dept: INTERNAL MEDICINE | Facility: CLINIC | Age: 81
End: 2021-09-01

## 2021-10-04 ENCOUNTER — TELEPHONE (OUTPATIENT)
Dept: INTERNAL MEDICINE | Facility: CLINIC | Age: 81
End: 2021-10-04

## 2021-10-04 NOTE — TELEPHONE ENCOUNTER
Caller: RAY    Relationship to patient: Home Health    Best call back number:[phone]    Patient is needing: RAY FROM [facility] IS CALLING TO STATE SHE IS FAXING SOME PAPER WORK FOR THIS PATIENT.  SHE STATES THE FAMILY IS  ANXIOUS TO BE ABLE TO GET PATIENT PLACED IN THIS PERSONAL CARE COMMUNITY.  SHE IS REQUESTING THE PAPER WORK TO BE COMPLETED AS SOON AS POSSIBLE.    PLEASE ADVISE.

## 2021-10-29 ENCOUNTER — APPOINTMENT (OUTPATIENT)
Dept: CT IMAGING | Facility: HOSPITAL | Age: 81
End: 2021-10-29

## 2021-10-29 ENCOUNTER — APPOINTMENT (OUTPATIENT)
Dept: GENERAL RADIOLOGY | Facility: HOSPITAL | Age: 81
End: 2021-10-29

## 2021-10-29 ENCOUNTER — HOSPITAL ENCOUNTER (INPATIENT)
Facility: HOSPITAL | Age: 81
LOS: 6 days | Discharge: NURSING, REHAB OR LONG-TERM CARE | End: 2021-11-04
Attending: EMERGENCY MEDICINE | Admitting: INTERNAL MEDICINE

## 2021-10-29 DIAGNOSIS — R53.1 WEAKNESS: Primary | ICD-10-CM

## 2021-10-29 DIAGNOSIS — N39.0 ACUTE UTI: ICD-10-CM

## 2021-10-29 DIAGNOSIS — D64.9 ANEMIA, UNSPECIFIED TYPE: ICD-10-CM

## 2021-10-29 DIAGNOSIS — F03.91 DEMENTIA WITH BEHAVIORAL DISTURBANCE, UNSPECIFIED DEMENTIA TYPE: ICD-10-CM

## 2021-10-29 DIAGNOSIS — N17.9 ACUTE KIDNEY INJURY (HCC): ICD-10-CM

## 2021-10-29 PROBLEM — F03.90 DEMENTIA (HCC): Status: ACTIVE | Noted: 2021-10-29

## 2021-10-29 PROBLEM — N18.9 CKD (CHRONIC KIDNEY DISEASE): Status: ACTIVE | Noted: 2021-10-29

## 2021-10-29 LAB
ALBUMIN SERPL-MCNC: 4.1 G/DL (ref 3.5–5.2)
ALBUMIN/GLOB SERPL: 1.8 G/DL
ALP SERPL-CCNC: 50 U/L (ref 39–117)
ALT SERPL W P-5'-P-CCNC: 19 U/L (ref 1–33)
ANION GAP SERPL CALCULATED.3IONS-SCNC: 15.9 MMOL/L (ref 5–15)
AST SERPL-CCNC: 71 U/L (ref 1–32)
BACTERIA UR QL AUTO: ABNORMAL /HPF
BASOPHILS # BLD AUTO: 0.03 10*3/MM3 (ref 0–0.2)
BASOPHILS NFR BLD AUTO: 0.2 % (ref 0–1.5)
BILIRUB SERPL-MCNC: 0.5 MG/DL (ref 0–1.2)
BILIRUB UR QL STRIP: NEGATIVE
BUN SERPL-MCNC: 32 MG/DL (ref 8–23)
BUN/CREAT SERPL: 18.6 (ref 7–25)
CALCIUM SPEC-SCNC: 9.2 MG/DL (ref 8.6–10.5)
CHLORIDE SERPL-SCNC: 104 MMOL/L (ref 98–107)
CLARITY UR: CLEAR
CO2 SERPL-SCNC: 21.1 MMOL/L (ref 22–29)
COLOR UR: ABNORMAL
CREAT SERPL-MCNC: 1.72 MG/DL (ref 0.57–1)
D-LACTATE SERPL-SCNC: 0.9 MMOL/L (ref 0.5–2)
DEPRECATED RDW RBC AUTO: 57 FL (ref 37–54)
EOSINOPHIL # BLD AUTO: 0 10*3/MM3 (ref 0–0.4)
EOSINOPHIL NFR BLD AUTO: 0 % (ref 0.3–6.2)
ERYTHROCYTE [DISTWIDTH] IN BLOOD BY AUTOMATED COUNT: 16.1 % (ref 12.3–15.4)
GFR SERPL CREATININE-BSD FRML MDRD: 28 ML/MIN/1.73
GLOBULIN UR ELPH-MCNC: 2.3 GM/DL
GLUCOSE SERPL-MCNC: 94 MG/DL (ref 65–99)
GLUCOSE UR STRIP-MCNC: ABNORMAL MG/DL
HCT VFR BLD AUTO: 27.8 % (ref 34–46.6)
HGB BLD-MCNC: 9.7 G/DL (ref 12–15.9)
HGB UR QL STRIP.AUTO: ABNORMAL
HOLD SPECIMEN: NORMAL
HOLD SPECIMEN: NORMAL
HYALINE CASTS UR QL AUTO: ABNORMAL /LPF
IMM GRANULOCYTES # BLD AUTO: 0.06 10*3/MM3 (ref 0–0.05)
IMM GRANULOCYTES NFR BLD AUTO: 0.5 % (ref 0–0.5)
INR PPP: 0.99 (ref 0.9–1.1)
KETONES UR QL STRIP: NEGATIVE
LEUKOCYTE ESTERASE UR QL STRIP.AUTO: ABNORMAL
LYMPHOCYTES # BLD AUTO: 0.43 10*3/MM3 (ref 0.7–3.1)
LYMPHOCYTES NFR BLD AUTO: 3.4 % (ref 19.6–45.3)
MAGNESIUM SERPL-MCNC: 2.3 MG/DL (ref 1.6–2.4)
MCH RBC QN AUTO: 34.2 PG (ref 26.6–33)
MCHC RBC AUTO-ENTMCNC: 34.9 G/DL (ref 31.5–35.7)
MCV RBC AUTO: 97.9 FL (ref 79–97)
MONOCYTES # BLD AUTO: 0.51 10*3/MM3 (ref 0.1–0.9)
MONOCYTES NFR BLD AUTO: 4.1 % (ref 5–12)
NEUTROPHILS NFR BLD AUTO: 11.48 10*3/MM3 (ref 1.7–7)
NEUTROPHILS NFR BLD AUTO: 91.8 % (ref 42.7–76)
NITRITE UR QL STRIP: POSITIVE
NRBC BLD AUTO-RTO: 0.1 /100 WBC (ref 0–0.2)
NT-PROBNP SERPL-MCNC: 865.2 PG/ML (ref 0–1800)
PH UR STRIP.AUTO: <=5 [PH] (ref 5–8)
PLATELET # BLD AUTO: 208 10*3/MM3 (ref 140–450)
PMV BLD AUTO: 10.3 FL (ref 6–12)
POTASSIUM SERPL-SCNC: 4 MMOL/L (ref 3.5–5.2)
PROCALCITONIN SERPL-MCNC: 2.74 NG/ML (ref 0–0.25)
PROT SERPL-MCNC: 6.4 G/DL (ref 6–8.5)
PROT UR QL STRIP: ABNORMAL
PROTHROMBIN TIME: 12.9 SECONDS (ref 11.7–14.2)
QT INTERVAL: 405 MS
RBC # BLD AUTO: 2.84 10*6/MM3 (ref 3.77–5.28)
RBC # UR: ABNORMAL /HPF
REF LAB TEST METHOD: ABNORMAL
SARS-COV-2 ORF1AB RESP QL NAA+PROBE: NOT DETECTED
SODIUM SERPL-SCNC: 141 MMOL/L (ref 136–145)
SP GR UR STRIP: 1.02 (ref 1–1.03)
SQUAMOUS #/AREA URNS HPF: ABNORMAL /HPF
TROPONIN T SERPL-MCNC: 0.02 NG/ML (ref 0–0.03)
UROBILINOGEN UR QL STRIP: ABNORMAL
WBC # BLD AUTO: 12.51 10*3/MM3 (ref 3.4–10.8)
WBC UR QL AUTO: ABNORMAL /HPF
WHOLE BLOOD HOLD SPECIMEN: NORMAL
WHOLE BLOOD HOLD SPECIMEN: NORMAL

## 2021-10-29 PROCEDURE — 25010000002 CEFTRIAXONE PER 250 MG: Performed by: EMERGENCY MEDICINE

## 2021-10-29 PROCEDURE — 85610 PROTHROMBIN TIME: CPT | Performed by: EMERGENCY MEDICINE

## 2021-10-29 PROCEDURE — 36415 COLL VENOUS BLD VENIPUNCTURE: CPT

## 2021-10-29 PROCEDURE — 83605 ASSAY OF LACTIC ACID: CPT | Performed by: EMERGENCY MEDICINE

## 2021-10-29 PROCEDURE — 70450 CT HEAD/BRAIN W/O DYE: CPT

## 2021-10-29 PROCEDURE — 84484 ASSAY OF TROPONIN QUANT: CPT | Performed by: EMERGENCY MEDICINE

## 2021-10-29 PROCEDURE — 87040 BLOOD CULTURE FOR BACTERIA: CPT | Performed by: EMERGENCY MEDICINE

## 2021-10-29 PROCEDURE — 71045 X-RAY EXAM CHEST 1 VIEW: CPT

## 2021-10-29 PROCEDURE — 83880 ASSAY OF NATRIURETIC PEPTIDE: CPT | Performed by: EMERGENCY MEDICINE

## 2021-10-29 PROCEDURE — 81001 URINALYSIS AUTO W/SCOPE: CPT | Performed by: EMERGENCY MEDICINE

## 2021-10-29 PROCEDURE — U0004 COV-19 TEST NON-CDC HGH THRU: HCPCS | Performed by: EMERGENCY MEDICINE

## 2021-10-29 PROCEDURE — 84145 PROCALCITONIN (PCT): CPT | Performed by: EMERGENCY MEDICINE

## 2021-10-29 PROCEDURE — 80053 COMPREHEN METABOLIC PANEL: CPT | Performed by: EMERGENCY MEDICINE

## 2021-10-29 PROCEDURE — 93005 ELECTROCARDIOGRAM TRACING: CPT | Performed by: EMERGENCY MEDICINE

## 2021-10-29 PROCEDURE — 83735 ASSAY OF MAGNESIUM: CPT | Performed by: EMERGENCY MEDICINE

## 2021-10-29 PROCEDURE — 85025 COMPLETE CBC W/AUTO DIFF WBC: CPT | Performed by: EMERGENCY MEDICINE

## 2021-10-29 PROCEDURE — 73060 X-RAY EXAM OF HUMERUS: CPT

## 2021-10-29 PROCEDURE — 99285 EMERGENCY DEPT VISIT HI MDM: CPT

## 2021-10-29 PROCEDURE — 74176 CT ABD & PELVIS W/O CONTRAST: CPT

## 2021-10-29 PROCEDURE — 93010 ELECTROCARDIOGRAM REPORT: CPT | Performed by: INTERNAL MEDICINE

## 2021-10-29 PROCEDURE — P9612 CATHETERIZE FOR URINE SPEC: HCPCS

## 2021-10-29 RX ORDER — DONEPEZIL HYDROCHLORIDE 10 MG/1
10 TABLET, FILM COATED ORAL NIGHTLY
Status: DISCONTINUED | OUTPATIENT
Start: 2021-10-29 | End: 2021-11-04 | Stop reason: HOSPADM

## 2021-10-29 RX ORDER — MULTIPLE VITAMINS W/ MINERALS TAB 9MG-400MCG
1 TAB ORAL DAILY
Status: DISCONTINUED | OUTPATIENT
Start: 2021-10-29 | End: 2021-11-04 | Stop reason: HOSPADM

## 2021-10-29 RX ORDER — SODIUM CHLORIDE 9 MG/ML
50 INJECTION, SOLUTION INTRAVENOUS CONTINUOUS
Status: DISCONTINUED | OUTPATIENT
Start: 2021-10-29 | End: 2021-10-31

## 2021-10-29 RX ORDER — SODIUM CHLORIDE 0.9 % (FLUSH) 0.9 %
10 SYRINGE (ML) INJECTION AS NEEDED
Status: DISCONTINUED | OUTPATIENT
Start: 2021-10-29 | End: 2021-11-04 | Stop reason: HOSPADM

## 2021-10-29 RX ORDER — SODIUM CHLORIDE 9 MG/ML
125 INJECTION, SOLUTION INTRAVENOUS CONTINUOUS
Status: DISCONTINUED | OUTPATIENT
Start: 2021-10-29 | End: 2021-10-30

## 2021-10-29 RX ORDER — CHOLECALCIFEROL (VITAMIN D3) 125 MCG
1000 CAPSULE ORAL DAILY
Status: DISCONTINUED | OUTPATIENT
Start: 2021-10-29 | End: 2021-11-04 | Stop reason: HOSPADM

## 2021-10-29 RX ORDER — ACETAMINOPHEN 160 MG/5ML
650 SOLUTION ORAL EVERY 4 HOURS PRN
Status: DISCONTINUED | OUTPATIENT
Start: 2021-10-29 | End: 2021-11-04 | Stop reason: HOSPADM

## 2021-10-29 RX ORDER — ONDANSETRON 2 MG/ML
4 INJECTION INTRAMUSCULAR; INTRAVENOUS EVERY 6 HOURS PRN
Status: DISCONTINUED | OUTPATIENT
Start: 2021-10-29 | End: 2021-11-04 | Stop reason: HOSPADM

## 2021-10-29 RX ORDER — ACETAMINOPHEN 650 MG/1
650 SUPPOSITORY RECTAL EVERY 4 HOURS PRN
Status: DISCONTINUED | OUTPATIENT
Start: 2021-10-29 | End: 2021-11-04 | Stop reason: HOSPADM

## 2021-10-29 RX ORDER — SODIUM CHLORIDE 0.9 % (FLUSH) 0.9 %
10 SYRINGE (ML) INJECTION EVERY 12 HOURS SCHEDULED
Status: DISCONTINUED | OUTPATIENT
Start: 2021-10-29 | End: 2021-11-04 | Stop reason: HOSPADM

## 2021-10-29 RX ORDER — AZELASTINE 1 MG/ML
1 SPRAY, METERED NASAL 2 TIMES DAILY
Status: DISCONTINUED | OUTPATIENT
Start: 2021-10-29 | End: 2021-11-04 | Stop reason: HOSPADM

## 2021-10-29 RX ORDER — ASPIRIN 81 MG/1
81 TABLET ORAL DAILY
Status: DISCONTINUED | OUTPATIENT
Start: 2021-10-29 | End: 2021-11-04 | Stop reason: HOSPADM

## 2021-10-29 RX ORDER — NITROGLYCERIN 0.4 MG/1
0.4 TABLET SUBLINGUAL
Status: DISCONTINUED | OUTPATIENT
Start: 2021-10-29 | End: 2021-11-04 | Stop reason: HOSPADM

## 2021-10-29 RX ORDER — CETIRIZINE HYDROCHLORIDE 10 MG/1
5 TABLET ORAL DAILY
Status: DISCONTINUED | OUTPATIENT
Start: 2021-10-29 | End: 2021-11-02

## 2021-10-29 RX ORDER — LANOLIN ALCOHOL/MO/W.PET/CERES
100 CREAM (GRAM) TOPICAL DAILY
Status: DISCONTINUED | OUTPATIENT
Start: 2021-10-29 | End: 2021-11-04 | Stop reason: HOSPADM

## 2021-10-29 RX ORDER — LEVOTHYROXINE SODIUM 0.05 MG/1
50 TABLET ORAL DAILY
Status: DISCONTINUED | OUTPATIENT
Start: 2021-10-29 | End: 2021-11-04 | Stop reason: HOSPADM

## 2021-10-29 RX ORDER — ACETAMINOPHEN 325 MG/1
650 TABLET ORAL EVERY 4 HOURS PRN
Status: DISCONTINUED | OUTPATIENT
Start: 2021-10-29 | End: 2021-11-04 | Stop reason: HOSPADM

## 2021-10-29 RX ADMIN — DONEPEZIL HYDROCHLORIDE 10 MG: 10 TABLET, FILM COATED ORAL at 22:19

## 2021-10-29 RX ADMIN — AZELASTINE HYDROCHLORIDE 1 SPRAY: 137 SPRAY, METERED NASAL at 22:20

## 2021-10-29 RX ADMIN — Medication 1000 MCG: at 22:19

## 2021-10-29 RX ADMIN — Medication 1 TABLET: at 22:19

## 2021-10-29 RX ADMIN — LEVOTHYROXINE SODIUM 50 MCG: 0.05 TABLET ORAL at 22:19

## 2021-10-29 RX ADMIN — SODIUM CHLORIDE 125 ML/HR: 9 INJECTION, SOLUTION INTRAVENOUS at 18:35

## 2021-10-29 RX ADMIN — CETIRIZINE HYDROCHLORIDE 5 MG: 10 TABLET ORAL at 22:18

## 2021-10-29 RX ADMIN — ASPIRIN 81 MG: 81 TABLET, COATED ORAL at 22:18

## 2021-10-29 RX ADMIN — Medication 100 MG: at 22:20

## 2021-10-29 RX ADMIN — CEFTRIAXONE 1 G: 1 INJECTION, POWDER, FOR SOLUTION INTRAMUSCULAR; INTRAVENOUS at 18:32

## 2021-10-29 RX ADMIN — SODIUM CHLORIDE, PRESERVATIVE FREE 10 ML: 5 INJECTION INTRAVENOUS at 22:20

## 2021-10-30 ENCOUNTER — APPOINTMENT (OUTPATIENT)
Dept: MRI IMAGING | Facility: HOSPITAL | Age: 81
End: 2021-10-30

## 2021-10-30 PROBLEM — R91.1 LUNG NODULE: Status: ACTIVE | Noted: 2021-10-30

## 2021-10-30 PROBLEM — I25.10 CAD (CORONARY ARTERY DISEASE): Status: ACTIVE | Noted: 2021-10-30

## 2021-10-30 PROBLEM — D63.8 ANEMIA, CHRONIC DISEASE: Status: ACTIVE | Noted: 2021-10-30

## 2021-10-30 PROBLEM — D72.829 LEUKOCYTOSIS: Status: ACTIVE | Noted: 2021-10-30

## 2021-10-30 PROBLEM — N18.30 STAGE 3 CHRONIC KIDNEY DISEASE (HCC): Status: ACTIVE | Noted: 2021-10-29

## 2021-10-30 PROBLEM — R79.89 ABNORMAL LFTS: Status: ACTIVE | Noted: 2021-10-30

## 2021-10-30 LAB
ALBUMIN SERPL-MCNC: 3.7 G/DL (ref 3.5–5.2)
ALBUMIN/GLOB SERPL: 1.7 G/DL
ALP SERPL-CCNC: 48 U/L (ref 39–117)
ALT SERPL W P-5'-P-CCNC: 36 U/L (ref 1–33)
ANION GAP SERPL CALCULATED.3IONS-SCNC: 11.8 MMOL/L (ref 5–15)
AST SERPL-CCNC: 158 U/L (ref 1–32)
BASOPHILS # BLD AUTO: 0.08 10*3/MM3 (ref 0–0.2)
BASOPHILS NFR BLD AUTO: 1.2 % (ref 0–1.5)
BILIRUB SERPL-MCNC: 0.4 MG/DL (ref 0–1.2)
BUN SERPL-MCNC: 25 MG/DL (ref 8–23)
BUN/CREAT SERPL: 18.4 (ref 7–25)
CALCIUM SPEC-SCNC: 8.7 MG/DL (ref 8.6–10.5)
CHLORIDE SERPL-SCNC: 104 MMOL/L (ref 98–107)
CO2 SERPL-SCNC: 23.2 MMOL/L (ref 22–29)
CREAT SERPL-MCNC: 1.36 MG/DL (ref 0.57–1)
D-LACTATE SERPL-SCNC: 1.9 MMOL/L (ref 0.5–2)
D-LACTATE SERPL-SCNC: 2.2 MMOL/L (ref 0.5–2)
DEPRECATED RDW RBC AUTO: 57.8 FL (ref 37–54)
EOSINOPHIL # BLD AUTO: 0.25 10*3/MM3 (ref 0–0.4)
EOSINOPHIL NFR BLD AUTO: 3.8 % (ref 0.3–6.2)
ERYTHROCYTE [DISTWIDTH] IN BLOOD BY AUTOMATED COUNT: 15.7 % (ref 12.3–15.4)
GFR SERPL CREATININE-BSD FRML MDRD: 37 ML/MIN/1.73
GLOBULIN UR ELPH-MCNC: 2.2 GM/DL
GLUCOSE SERPL-MCNC: 119 MG/DL (ref 65–99)
HCT VFR BLD AUTO: 29.3 % (ref 34–46.6)
HGB BLD-MCNC: 10 G/DL (ref 12–15.9)
IMM GRANULOCYTES # BLD AUTO: 0.02 10*3/MM3 (ref 0–0.05)
IMM GRANULOCYTES NFR BLD AUTO: 0.3 % (ref 0–0.5)
LYMPHOCYTES # BLD AUTO: 0.77 10*3/MM3 (ref 0.7–3.1)
LYMPHOCYTES NFR BLD AUTO: 11.6 % (ref 19.6–45.3)
MCH RBC QN AUTO: 34.5 PG (ref 26.6–33)
MCHC RBC AUTO-ENTMCNC: 34.1 G/DL (ref 31.5–35.7)
MCV RBC AUTO: 101 FL (ref 79–97)
MONOCYTES # BLD AUTO: 0.61 10*3/MM3 (ref 0.1–0.9)
MONOCYTES NFR BLD AUTO: 9.2 % (ref 5–12)
NEUTROPHILS NFR BLD AUTO: 4.89 10*3/MM3 (ref 1.7–7)
NEUTROPHILS NFR BLD AUTO: 73.9 % (ref 42.7–76)
NRBC BLD AUTO-RTO: 0 /100 WBC (ref 0–0.2)
PLATELET # BLD AUTO: 165 10*3/MM3 (ref 140–450)
PMV BLD AUTO: 10.4 FL (ref 6–12)
POTASSIUM SERPL-SCNC: 3.8 MMOL/L (ref 3.5–5.2)
PROT SERPL-MCNC: 5.9 G/DL (ref 6–8.5)
RBC # BLD AUTO: 2.9 10*6/MM3 (ref 3.77–5.28)
SODIUM SERPL-SCNC: 139 MMOL/L (ref 136–145)
WBC # BLD AUTO: 6.62 10*3/MM3 (ref 3.4–10.8)

## 2021-10-30 PROCEDURE — 97166 OT EVAL MOD COMPLEX 45 MIN: CPT

## 2021-10-30 PROCEDURE — 25010000002 CEFTRIAXONE PER 250 MG: Performed by: INTERNAL MEDICINE

## 2021-10-30 PROCEDURE — 83605 ASSAY OF LACTIC ACID: CPT | Performed by: INTERNAL MEDICINE

## 2021-10-30 PROCEDURE — 36415 COLL VENOUS BLD VENIPUNCTURE: CPT | Performed by: INTERNAL MEDICINE

## 2021-10-30 PROCEDURE — 97530 THERAPEUTIC ACTIVITIES: CPT

## 2021-10-30 PROCEDURE — 99222 1ST HOSP IP/OBS MODERATE 55: CPT | Performed by: PSYCHIATRY & NEUROLOGY

## 2021-10-30 PROCEDURE — 80053 COMPREHEN METABOLIC PANEL: CPT | Performed by: INTERNAL MEDICINE

## 2021-10-30 PROCEDURE — 85025 COMPLETE CBC W/AUTO DIFF WBC: CPT | Performed by: INTERNAL MEDICINE

## 2021-10-30 PROCEDURE — 70551 MRI BRAIN STEM W/O DYE: CPT

## 2021-10-30 RX ADMIN — DONEPEZIL HYDROCHLORIDE 10 MG: 10 TABLET, FILM COATED ORAL at 20:55

## 2021-10-30 RX ADMIN — ASPIRIN 81 MG: 81 TABLET, COATED ORAL at 08:32

## 2021-10-30 RX ADMIN — CETIRIZINE HYDROCHLORIDE 5 MG: 10 TABLET ORAL at 08:32

## 2021-10-30 RX ADMIN — Medication 1 TABLET: at 08:32

## 2021-10-30 RX ADMIN — Medication 1000 MCG: at 08:32

## 2021-10-30 RX ADMIN — Medication 100 MG: at 08:32

## 2021-10-30 RX ADMIN — SODIUM CHLORIDE, PRESERVATIVE FREE 10 ML: 5 INJECTION INTRAVENOUS at 08:32

## 2021-10-30 RX ADMIN — AZELASTINE HYDROCHLORIDE 1 SPRAY: 137 SPRAY, METERED NASAL at 20:55

## 2021-10-30 RX ADMIN — LEVOTHYROXINE SODIUM 50 MCG: 0.05 TABLET ORAL at 08:32

## 2021-10-30 RX ADMIN — AZELASTINE HYDROCHLORIDE 1 SPRAY: 137 SPRAY, METERED NASAL at 08:38

## 2021-10-30 RX ADMIN — CEFTRIAXONE 1 G: 1 INJECTION, POWDER, FOR SOLUTION INTRAMUSCULAR; INTRAVENOUS at 17:06

## 2021-10-30 RX ADMIN — SODIUM CHLORIDE 100 ML/HR: 9 INJECTION, SOLUTION INTRAVENOUS at 23:32

## 2021-10-30 RX ADMIN — SODIUM CHLORIDE, PRESERVATIVE FREE 10 ML: 5 INJECTION INTRAVENOUS at 20:56

## 2021-10-31 ENCOUNTER — APPOINTMENT (OUTPATIENT)
Dept: CT IMAGING | Facility: HOSPITAL | Age: 81
End: 2021-10-31

## 2021-10-31 LAB
ALBUMIN SERPL-MCNC: 3.7 G/DL (ref 3.5–5.2)
ALBUMIN/GLOB SERPL: 1.6 G/DL
ALP SERPL-CCNC: 56 U/L (ref 39–117)
ALT SERPL W P-5'-P-CCNC: 37 U/L (ref 1–33)
ANION GAP SERPL CALCULATED.3IONS-SCNC: 10.7 MMOL/L (ref 5–15)
AST SERPL-CCNC: 126 U/L (ref 1–32)
BILIRUB SERPL-MCNC: 0.3 MG/DL (ref 0–1.2)
BUN SERPL-MCNC: 21 MG/DL (ref 8–23)
BUN/CREAT SERPL: 20.2 (ref 7–25)
CALCIUM SPEC-SCNC: 8.5 MG/DL (ref 8.6–10.5)
CHLORIDE SERPL-SCNC: 110 MMOL/L (ref 98–107)
CO2 SERPL-SCNC: 24.3 MMOL/L (ref 22–29)
CREAT SERPL-MCNC: 1.04 MG/DL (ref 0.57–1)
DEPRECATED RDW RBC AUTO: 59.5 FL (ref 37–54)
ERYTHROCYTE [DISTWIDTH] IN BLOOD BY AUTOMATED COUNT: 16.1 % (ref 12.3–15.4)
GFR SERPL CREATININE-BSD FRML MDRD: 51 ML/MIN/1.73
GLOBULIN UR ELPH-MCNC: 2.3 GM/DL
GLUCOSE SERPL-MCNC: 88 MG/DL (ref 65–99)
HCT VFR BLD AUTO: 30.4 % (ref 34–46.6)
HGB BLD-MCNC: 10.1 G/DL (ref 12–15.9)
MCH RBC QN AUTO: 33.8 PG (ref 26.6–33)
MCHC RBC AUTO-ENTMCNC: 33.2 G/DL (ref 31.5–35.7)
MCV RBC AUTO: 101.7 FL (ref 79–97)
PLATELET # BLD AUTO: 185 10*3/MM3 (ref 140–450)
PMV BLD AUTO: 9.8 FL (ref 6–12)
POTASSIUM SERPL-SCNC: 3.3 MMOL/L (ref 3.5–5.2)
PROT SERPL-MCNC: 6 G/DL (ref 6–8.5)
RBC # BLD AUTO: 2.99 10*6/MM3 (ref 3.77–5.28)
SODIUM SERPL-SCNC: 145 MMOL/L (ref 136–145)
WBC # BLD AUTO: 6.6 10*3/MM3 (ref 3.4–10.8)

## 2021-10-31 PROCEDURE — 85027 COMPLETE CBC AUTOMATED: CPT | Performed by: NURSE PRACTITIONER

## 2021-10-31 PROCEDURE — 97162 PT EVAL MOD COMPLEX 30 MIN: CPT

## 2021-10-31 PROCEDURE — 99232 SBSQ HOSP IP/OBS MODERATE 35: CPT | Performed by: NURSE PRACTITIONER

## 2021-10-31 PROCEDURE — 97530 THERAPEUTIC ACTIVITIES: CPT

## 2021-10-31 PROCEDURE — 80053 COMPREHEN METABOLIC PANEL: CPT | Performed by: NURSE PRACTITIONER

## 2021-10-31 PROCEDURE — 25010000002 CEFTRIAXONE PER 250 MG: Performed by: INTERNAL MEDICINE

## 2021-10-31 PROCEDURE — 71250 CT THORAX DX C-: CPT

## 2021-10-31 RX ORDER — POTASSIUM CHLORIDE 750 MG/1
40 TABLET, FILM COATED, EXTENDED RELEASE ORAL ONCE
Status: COMPLETED | OUTPATIENT
Start: 2021-10-31 | End: 2021-10-31

## 2021-10-31 RX ADMIN — DONEPEZIL HYDROCHLORIDE 10 MG: 10 TABLET, FILM COATED ORAL at 20:35

## 2021-10-31 RX ADMIN — Medication 1000 MCG: at 08:41

## 2021-10-31 RX ADMIN — ASPIRIN 81 MG: 81 TABLET, COATED ORAL at 08:41

## 2021-10-31 RX ADMIN — AZELASTINE HYDROCHLORIDE 1 SPRAY: 137 SPRAY, METERED NASAL at 08:41

## 2021-10-31 RX ADMIN — CETIRIZINE HYDROCHLORIDE 5 MG: 10 TABLET ORAL at 08:41

## 2021-10-31 RX ADMIN — POTASSIUM CHLORIDE 40 MEQ: 750 TABLET, EXTENDED RELEASE ORAL at 15:14

## 2021-10-31 RX ADMIN — AZELASTINE HYDROCHLORIDE 1 SPRAY: 137 SPRAY, METERED NASAL at 20:36

## 2021-10-31 RX ADMIN — CEFTRIAXONE 1 G: 1 INJECTION, POWDER, FOR SOLUTION INTRAMUSCULAR; INTRAVENOUS at 17:06

## 2021-10-31 RX ADMIN — Medication 100 MG: at 08:41

## 2021-10-31 RX ADMIN — SODIUM CHLORIDE, PRESERVATIVE FREE 10 ML: 5 INJECTION INTRAVENOUS at 08:42

## 2021-10-31 RX ADMIN — LEVOTHYROXINE SODIUM 50 MCG: 0.05 TABLET ORAL at 08:41

## 2021-10-31 RX ADMIN — Medication 1 TABLET: at 08:41

## 2021-10-31 RX ADMIN — SODIUM CHLORIDE, PRESERVATIVE FREE 10 ML: 5 INJECTION INTRAVENOUS at 20:35

## 2021-11-01 LAB
ALBUMIN SERPL-MCNC: 3.7 G/DL (ref 3.5–5.2)
ALBUMIN/GLOB SERPL: 1.5 G/DL
ALP SERPL-CCNC: 59 U/L (ref 39–117)
ALT SERPL W P-5'-P-CCNC: 36 U/L (ref 1–33)
ANION GAP SERPL CALCULATED.3IONS-SCNC: 10.9 MMOL/L (ref 5–15)
AST SERPL-CCNC: 107 U/L (ref 1–32)
BILIRUB SERPL-MCNC: 0.4 MG/DL (ref 0–1.2)
BUN SERPL-MCNC: 15 MG/DL (ref 8–23)
BUN/CREAT SERPL: 14 (ref 7–25)
CALCIUM SPEC-SCNC: 9.1 MG/DL (ref 8.6–10.5)
CHLORIDE SERPL-SCNC: 108 MMOL/L (ref 98–107)
CO2 SERPL-SCNC: 26.1 MMOL/L (ref 22–29)
CREAT SERPL-MCNC: 1.07 MG/DL (ref 0.57–1)
DEPRECATED RDW RBC AUTO: 58.5 FL (ref 37–54)
ERYTHROCYTE [DISTWIDTH] IN BLOOD BY AUTOMATED COUNT: 16.1 % (ref 12.3–15.4)
GFR SERPL CREATININE-BSD FRML MDRD: 49 ML/MIN/1.73
GLOBULIN UR ELPH-MCNC: 2.5 GM/DL
GLUCOSE SERPL-MCNC: 105 MG/DL (ref 65–99)
HCT VFR BLD AUTO: 31.6 % (ref 34–46.6)
HGB BLD-MCNC: 10.6 G/DL (ref 12–15.9)
MCH RBC QN AUTO: 33.7 PG (ref 26.6–33)
MCHC RBC AUTO-ENTMCNC: 33.5 G/DL (ref 31.5–35.7)
MCV RBC AUTO: 100.3 FL (ref 79–97)
PLATELET # BLD AUTO: 204 10*3/MM3 (ref 140–450)
PMV BLD AUTO: 9.9 FL (ref 6–12)
POTASSIUM SERPL-SCNC: 4 MMOL/L (ref 3.5–5.2)
PROT SERPL-MCNC: 6.2 G/DL (ref 6–8.5)
RBC # BLD AUTO: 3.15 10*6/MM3 (ref 3.77–5.28)
SODIUM SERPL-SCNC: 145 MMOL/L (ref 136–145)
T4 FREE SERPL-MCNC: 1.43 NG/DL (ref 0.93–1.7)
TSH SERPL DL<=0.05 MIU/L-ACNC: 4.21 UIU/ML (ref 0.27–4.2)
WBC # BLD AUTO: 7.15 10*3/MM3 (ref 3.4–10.8)

## 2021-11-01 PROCEDURE — 99223 1ST HOSP IP/OBS HIGH 75: CPT | Performed by: INTERNAL MEDICINE

## 2021-11-01 PROCEDURE — 84439 ASSAY OF FREE THYROXINE: CPT | Performed by: INTERNAL MEDICINE

## 2021-11-01 PROCEDURE — 84443 ASSAY THYROID STIM HORMONE: CPT | Performed by: INTERNAL MEDICINE

## 2021-11-01 PROCEDURE — 80053 COMPREHEN METABOLIC PANEL: CPT | Performed by: NURSE PRACTITIONER

## 2021-11-01 PROCEDURE — 85027 COMPLETE CBC AUTOMATED: CPT | Performed by: NURSE PRACTITIONER

## 2021-11-01 PROCEDURE — 97530 THERAPEUTIC ACTIVITIES: CPT

## 2021-11-01 PROCEDURE — 25010000002 CEFTRIAXONE PER 250 MG: Performed by: INTERNAL MEDICINE

## 2021-11-01 RX ORDER — HYDRALAZINE HYDROCHLORIDE 25 MG/1
25 TABLET, FILM COATED ORAL EVERY 6 HOURS PRN
Status: DISCONTINUED | OUTPATIENT
Start: 2021-11-01 | End: 2021-11-04 | Stop reason: HOSPADM

## 2021-11-01 RX ADMIN — ASPIRIN 81 MG: 81 TABLET, COATED ORAL at 09:01

## 2021-11-01 RX ADMIN — AZELASTINE HYDROCHLORIDE 1 SPRAY: 137 SPRAY, METERED NASAL at 09:01

## 2021-11-01 RX ADMIN — Medication 1000 MCG: at 09:01

## 2021-11-01 RX ADMIN — SODIUM CHLORIDE, PRESERVATIVE FREE 10 ML: 5 INJECTION INTRAVENOUS at 20:37

## 2021-11-01 RX ADMIN — CEFTRIAXONE 1 G: 1 INJECTION, POWDER, FOR SOLUTION INTRAMUSCULAR; INTRAVENOUS at 17:40

## 2021-11-01 RX ADMIN — CETIRIZINE HYDROCHLORIDE 5 MG: 10 TABLET ORAL at 09:01

## 2021-11-01 RX ADMIN — DONEPEZIL HYDROCHLORIDE 10 MG: 10 TABLET, FILM COATED ORAL at 20:37

## 2021-11-01 RX ADMIN — AZELASTINE HYDROCHLORIDE 1 SPRAY: 137 SPRAY, METERED NASAL at 20:37

## 2021-11-01 RX ADMIN — Medication 1 TABLET: at 09:01

## 2021-11-01 RX ADMIN — Medication 100 MG: at 09:01

## 2021-11-01 RX ADMIN — LEVOTHYROXINE SODIUM 50 MCG: 0.05 TABLET ORAL at 09:01

## 2021-11-01 NOTE — NURSING NOTE
Patient's potassium on 1031 was 3.3, no electrolyte replacement protocol, A notified, will recheck labs this morning.

## 2021-11-01 NOTE — PROGRESS NOTES
Name: Dory Hampton ADMIT: 10/29/2021   : 1940  PCP: Shantel Michelle MD    MRN: [id number] LOS: 3 days   AGE/SEX: 81 y.o. female  ROOM: [facility]   Subjective   Chief Complaint   Patient presents with   • Fall   • Altered Mental Status       ROS  No f/c  No n/v  No cp/palp  No soa/cough    Objective   Vital Signs  Temp:  [97.2 °F (36.2 °C)-98.2 °F (36.8 °C)] 97.7 °F (36.5 °C)  Heart Rate:  [73-86] 75  Resp:  [18] 18  BP: (135-178)/(66-96) 161/76  SpO2:  [93 %-98 %] 97 %  on   ;   Device (Oxygen Therapy): room air  Body mass index is 18.96 kg/m².    elderly  Physical Exam  Constitutional:       General: She is not in acute distress.  HENT:      Head: Normocephalic and atraumatic.   Eyes:      General: No scleral icterus.  Cardiovascular:      Rate and Rhythm: Regular rhythm.      Heart sounds: Normal heart sounds.   Pulmonary:      Effort: Pulmonary effort is normal. No respiratory distress.   Abdominal:      General: There is no distension.      Palpations: Abdomen is soft.   Musculoskeletal:      Cervical back: Neck supple.   Skin:     Coloration: Skin is pale.   Neurological:      Mental Status: She is alert.   Psychiatric:         Behavior: Behavior normal.         Results Review:       I reviewed the patient's new clinical results.  Results from last 7 days   Lab Units 21  0708 10/31/21  0544 10/30/21  0846 10/29/21  1414   WBC 10*3/mm3 7.15 6.60 6.62 12.51*   HEMOGLOBIN g/dL 10.6* 10.1* 10.0* 9.7*   PLATELETS 10*3/mm3 204 185 165 208     Results from last 7 days   Lab Units 21  0708 10/31/21  0544 10/30/21  0846 10/29/21  1414   SODIUM mmol/L 145 145 139 141   POTASSIUM mmol/L 4.0 3.3* 3.8 4.0   CHLORIDE mmol/L 108* 110* 104 104   CO2 mmol/L 26.1 24.3 23.2 21.1*   BUN mg/dL 15 21 25* 32*   CREATININE mg/dL 1.07* 1.04* 1.36* 1.72*   GLUCOSE mg/dL 105* 88 119* 94   Estimated Creatinine Clearance: 32.6 mL/min (A) (by C-G formula based on SCr of 1.07 mg/dL (H)).  Results from last 7 days    Lab Units 11/01/21  0708 10/31/21  0544 10/30/21  0846 10/29/21  1414   ALBUMIN g/dL 3.70 3.70 3.70 4.10   BILIRUBIN mg/dL 0.4 0.3 0.4 0.5   ALK PHOS U/L 59 56 48 50   AST (SGOT) U/L 107* 126* 158* 71*   ALT (SGPT) U/L 36* 37* 36* 19     Results from last 7 days   Lab Units 11/01/21  0708 10/31/21  0544 10/30/21  0846 10/29/21  1414   CALCIUM mg/dL 9.1 8.5* 8.7 9.2   ALBUMIN g/dL 3.70 3.70 3.70 4.10   MAGNESIUM mg/dL  --   --   --  2.3     Results from last 7 days   Lab Units 10/30/21  1022 10/30/21  0846 10/29/21  1745   PROCALCITONIN ng/mL  --   --  2.74*   LACTATE mmol/L 1.9 2.2* 0.9       Coag   Results from last 7 days   Lab Units 10/29/21  1414   INR  0.99     HbA1C   Lab Results   Component Value Date    HGBA1C 5.10 03/18/2019    HGBA1C 4.30 (L) 12/15/2016     Infection   Results from last 7 days   Lab Units 10/29/21  1831 10/29/21  1800 10/29/21  1745   BLOODCX  No growth at 3 days No growth at 3 days  --    PROCALCITONIN ng/mL  --   --  2.74*     Radiology(recent) No radiology results for the last day  No results found for: TROPONINT, TROPONINI, BNP  No components found for: TSH;2    aspirin, 81 mg, Oral, Daily  azelastine, 1 spray, Each Nare, BID  cefTRIAXone, 1 g, Intravenous, Q24H  cetirizine, 5 mg, Oral, Daily  donepezil, 10 mg, Oral, Nightly  levothyroxine, 50 mcg, Oral, Daily  multivitamin with minerals, 1 tablet, Oral, Daily  sodium chloride, 10 mL, Intravenous, Q12H  vitamin B-12, 1,000 mcg, Oral, Daily  vitamin B-6, 100 mg, Oral, Daily       Diet Regular; Cardiac      Assessment/Plan      Active Hospital Problems    Diagnosis  POA   • **Generalized weakness [R53.1]  Yes   • Lung nodule [R91.1]  Yes   • CAD (coronary artery disease) [I25.10]  Yes   • Leukocytosis [D72.829]  Yes   • Anemia, chronic disease [D63.8]  Yes   • Abnormal LFTs [R94.5]  Yes   • Dementia (HCC) [F03.90]  Yes   • JOSÉ LUIS (acute kidney injury) (HCC) [N17.9]  Yes   • Stage 3 chronic kidney disease (HCC) [N18.30]  Yes   • Paroxysmal  atrial fibrillation (HCC) [I48.0]  Yes   • Cognitive change [R41.89]  Yes   • S/P AVR [Z95.2]  Not Applicable   • Essential hypertension [I10]  Yes      Resolved Hospital Problems   No resolved problems to display.           Ms. Hampton is a 81 year old female who presented to the hospital after she was found down and confused at her facility. There was some concern for leftward lean by staff.     · Generalized weakness: Multifactorial at this point. CT head negative. Neurology has been consulted. MRI negative for acute abnormalities. PT/OT following.  Neuro signed off. Outpatient EMG/NCS per neuro.  · Leukocytosis: Resolved. Had higher than normal procal. Continue empiric abx for presumed infection at this point. She did report increased urination, but UA is not that impressive. CT A/P negative. Will plan on 5 days abx for presumed infection.    ·  CT chest confirms nodule with some concerns for malignancy (which could also explain elevated procal). Ask oncology to weigh in to see if they think something that should be biopsied now vs short term repeat imaging.    · CAD/s/p AVR/PAF: NSR with no recurrence of afib since her valve replacement. She is followed by Dr. Bryan.  · JOSÉ LUIS on CKD3: will stop fluids as she is eating and drinking.  · Dementia: Stable with some possible increased confusion on admission.  Delirium precautions. Likely to have worse issues with confusion in hospital vs at AL    · HTN: BP stable. Added prn agents. Recently on diuretic/arb combo as outpatient which had been stopped. Monitor for need to add back daily medication  .   · Anemia: Chronic and stable. Monitor trends.  · Abnormal LFTs:   · Hypokalemia: Replace as needed.     Dispo- AL vs subacute rehab, likely soon     VTE Prophylaxis - SCDs  RALPH RN  Greater than 36 minutes spent with greater than 50% counseling and coordinating care    Called patients daughter but no answer  Called patients son and left message    Renan Watson,  MD [facility]  11/01/21  19:12 EDT

## 2021-11-01 NOTE — CASE MANAGEMENT/SOCIAL WORK
Discharge Planning Assessment  [facility]     Patient Name: Dory Hampton  MRN: [id number]  Today's Date: 11/1/2021    Admit Date: 10/29/2021     Discharge Needs Assessment     Row Name 11/01/21 1323       Living Environment    Lives With facility resident    Current Living Arrangements residential facility    Primary Care Provided by other (see comments)    Provides Primary Care For no one, unable/limited ability to care for self    Able to Return to Prior Arrangements yes       Transition Planning    Patient/Family Anticipates Transition to long-term care facility       Discharge Needs Assessment    Equipment Currently Used at Home walker, rolling    Concerns to be Addressed adjustment to diagnosis/illness               Discharge Plan     Row Name 11/01/21 1324       Plan    Plan Plans are to return to [facility] vs rehab facility.    Patient/Family in Agreement with Plan yes    Plan Comments CCP spoke with Toñito, pt's son, states pt moved into [facility] 3 weeks ago.  Pt started using a walker @ that time.  CCP explained that [facility] will need to eval pt when closer to [place] to decide if she can return.  Pt has no hx of rehab/ gave list of facilities and asked to pick if needed. CCP will follow. Sahra Mahajan RN              Continued Care and Services - Admitted Since 10/29/2021     Destination     Service Provider Request Status Selected Services Address Phone Fax Patient Preferred    [facility]  Accepted N/A [id number] [id number] [phone] --                 Demographic Summary    No documentation.                Functional Status    No documentation.                Psychosocial    No documentation.                Abuse/Neglect    No documentation.                Legal    No documentation.                Substance Abuse    No documentation.                Patient Forms    No documentation.                   Sahra Mahajan RN

## 2021-11-01 NOTE — PROGRESS NOTES
Discharge Planning Assessment  [facility]     Patient Name: Dory Hampton  MRN: [id number]  Today's Date: 11/1/2021    Admit Date: 10/29/2021     Discharge Needs Assessment    No documentation.                Discharge Plan     Row Name 11/01/21 1753       Plan    Plan Will need skilled rehab choices from the family    Plan Comments Spoke with Charlene/Jenna she stated patient will need skilled rehab before she can return to [place]. Roshni JONES              Continued Care and Services - Admitted Since 10/29/2021     Destination     Service Provider Request Status Selected Services Address Phone Fax Patient Preferred    [facility]  Accepted N/A [address] [id number] [phone] --                 Demographic Summary    No documentation.                Functional Status    No documentation.                Psychosocial    No documentation.                Abuse/Neglect    No documentation.                Legal    No documentation.                Substance Abuse    No documentation.                Patient Forms    No documentation.                   Marta Fay, RN

## 2021-11-01 NOTE — PLAN OF CARE
Goal Outcome Evaluation:  Plan of Care Reviewed With: patient        Progress: declining  Outcome Summary: Patient is agreeable to PT and AO to self only this AM. She completed supine<>sitting and STS to rwx with modA. Pt with poor sitting and standing balance requiring CGA-modA for static sitting and maxA for static standing balance. Pt with R lateral and posterior lean in sitting and standing. Cues for midline posture and weightshifting in sitting. RN notified of functional decline and R lateral lean. Pt will continue to benefit from skilled PT to increase level of independence.  Patient was not wearing a face mask during this therapy encounter. Therapist used appropriate personal protective equipment including eye protection, mask, and gloves.  Mask used was standard procedure mask. Appropriate PPE was worn during the entire therapy session. Hand hygiene was completed before and after therapy session. Patient is not in enhanced droplet precautions.

## 2021-11-01 NOTE — CONSULTS
[facility] CBC GROUP INITIAL INPATIENT CONSULTATION NOTE    REASON FOR CONSULTATION: Lung nodule      HISTORY OF PRESENT ILLNESS:  Dory Hampton is a 81 y.o. female who we are asked to see today in consultation for the above issues.    The patient is a resident of [facility].  She has history of dementia, paroxysmal atrial fibrillation, hypertension, hyperlipidemia, aortic stenosis status post aortic valve replacement in 2016, coronary artery disease, diastolic dysfunction, carotid stenosis, CKD3.  The patient has a prior history of iron deficiency anemia with subsequent finding of mild hemolytic anemia attributed to her devious aortic stenosis.  Anemia improved significantly following aortic valve replacement.  Patient had been seen previously in our office for this issue, has not been seen in recent years however.  Hemoglobin had stabilized in the 11-12 range.    Patient is currently admitted after developing worsening generalized weakness and experiencing a fall at the nursing facility.  Patient with increased confusion since admission.  She underwent radiographic evaluation 10/29/2021 with no evidence of fracture right humerus, CT head which showed mild to moderate small vessel disease and diffuse cerebral atrophy with prominent lateral and third ventricles.  She did have an elevated pro calcitonin at 2.74 on 10/29/2021, blood cultures negative, urinalysis felt to be consistent with UTI.  CT abdomen and pelvis on 10/29/2021 showed a 1.3 cm noncalcified pulmonary nodule in the right lower lobe posterior sulcus.  Subsequent CT chest on 10/31/2021 with calcified granuloma in the anterior portion of the right base, nodular pleural-based density in the right base posteriorly with associated pleural reaction measuring 1 x 1.5 cm, somewhat suspicious for malignancy.  There were calcified infracarinal lymph nodes, no lymphadenopathy in the mediastinum or hilar regions.    MRI brain on 10/30/2021 showed again  mild to moderate small vessel disease, diffuse cerebral atrophy with prominent lateral and third ventricles.    Patient has been seen by neurology, felt to have dementia and chronic [place] bilateral lower extremity weakness that was exacerbated by UTI.  Recommended consideration of outpatient EMG/NCS studies.    During hospitalization, hemoglobin has declined into the 10-11 range, persistently elevated MCV.    This evening, patient is awake and arousable but somewhat lethargic and mildly confused.    Past Medical History:   Diagnosis Date   • Abnormal electrocardiogram    • Anemia    • Aortic stenosis     severe; s/p repair   • Asthma     HISTORY   • Bilateral carotid artery stenosis 12/1/2020   • Bradycardia    • Coronary artery disease    • Diastolic dysfunction     grade II   • Environmental allergies    • Female stress incontinence    • Hemolytic anemia, mechanical ([facility]) 8/8/2016   • History of diverticulosis    • History of transfusion    • Hyperlipidemia    • Hypertension    • Hypokalemia    • Hypothyroid    • Iron deficiency anemia     hemolytic anemia   • Iron deficiency anemia due to chronic blood loss 8/8/2016   • Left ventricular hypertrophy    • Murmur    • PAF (paroxysmal atrial fibrillation) ([facility])     with RVR   • Pleural effusion     bilateral, post op   • Psoriasis    • Tendinitis    • Thoracic aorta atherosclerosis ([facility])        Past Surgical History:   Procedure Laterality Date   • AORTIC VALVE REPAIR/REPLACEMENT N/A 12/16/2016    Procedure: BHARGAVI MINI STERNOTOMY AORTIC VALVE REPLACEMENT;  Surgeon: Robin Jones MD;  Location: [facility] OR;  Service:    • APPENDECTOMY  06/1972   • CAPSULE ENDOSCOPY  2015   • CARDIAC CATHETERIZATION N/A 8/25/2016    Procedure: Coronary angiography;  Surgeon: Lulú Hooper MD;  Location: [facility] CATH INVASIVE LOCATION;  Service:    • CARDIAC CATHETERIZATION N/A 8/25/2016    Procedure: Right Heart Cath;  Surgeon: Lulú Hooper MD;  Location: [facility] CATH  INVASIVE LOCATION;  Service:    • CATARACT EXTRACTION Bilateral     Left 12/10/13, Right 12/17/2013   • CATARACT EXTRACTION     • COLONOSCOPY  06/22/2015    Dr. Mohamud   • DENTAL PROCEDURE  2011    Implants   • ENDOSCOPY  06/22/2015    Dr. Mohamud   • ENTEROSCOPY SMALL BOWEL N/A 11/21/2016    Procedure: ENTEROSCOPY SMALL BOWEL WITH APC CAUTERY;  Surgeon: Tj Torres MD;  Location: [facility] ENDOSCOPY;  Service:    • PELVIC LAPAROSCOPY     • STERNOTOMY      mini   • TONSILLECTOMY     • TUBAL ABDOMINAL LIGATION         SOCIAL HISTORY:   reports that she quit smoking about 11 years ago. Her smoking use included cigarettes. She has a 20.00 pack-year smoking history. She has never used smokeless tobacco. She reports current alcohol use. She reports that she does not use drugs.    FAMILY HISTORY:  family history includes Alzheimer's disease in her mother; Dementia in her mother; Endometriosis in her mother; Heart attack in her maternal grandmother; Heart disease in her maternal grandfather; Heart failure in her maternal grandmother; Hypertension in her maternal aunt, maternal uncle, and mother; Stroke in her maternal grandfather; Tongue cancer in her father; Uterine cancer in her mother.    ALLERGIES:  Allergies   Allergen Reactions   • Eggs Or Egg-Derived Products Swelling   • Formaldehyde Other (See Comments)     Positive allergy testing   • Other Swelling     Tree nuts, pecans, walnuts, eggs        Mouth swells   • Penicillins Rash   • Sulfa Antibiotics Rash       MEDICATIONS:  As listed in the electronic medical record.    Review of Systems comprehensive review of systems was obtained with pertinent positive findings as noted in the interval history above.  All other systems negative.    Vitals:    10/31/21 2318 11/01/21 0328 11/01/21 0505 11/01/21 0716   BP: 166/84 178/96  170/76   BP Location: Right arm Right arm  Right arm   Patient Position: Lying Lying  Lying   Pulse: 73 73  86   Resp: 18 18  18   Temp: 98.1  °F (36.7 °C) 97.8 °F (36.6 °C)  97.2 °F (36.2 °C)   TempSrc: Oral Oral  Oral   SpO2: 95% 93%     Weight:   50.1 kg (110 lb 7.2 oz)    Height:           Physical Exam  Constitutional:       Appearance: She is well-developed.      Comments: Elderly female lying in bed no distress.  Confused.   Eyes:      Conjunctiva/sclera: Conjunctivae normal.   Neck:      Thyroid: No thyromegaly.   Cardiovascular:      Rate and Rhythm: Normal rate and regular rhythm.      Heart sounds: No murmur heard.  No friction rub. No gallop.    Pulmonary:      Effort: No respiratory distress.      Breath sounds: Normal breath sounds.   Chest:   Breasts:      Right: No supraclavicular adenopathy.      Left: No supraclavicular adenopathy.       Abdominal:      General: Bowel sounds are normal. There is no distension.      Palpations: Abdomen is soft.      Tenderness: There is no abdominal tenderness.   Lymphadenopathy:      Head:      Right side of head: No submandibular adenopathy.      Cervical: No cervical adenopathy.      Upper Body:      Right upper body: No supraclavicular adenopathy.      Left upper body: No supraclavicular adenopathy.   Skin:     General: Skin is warm and dry.      Findings: No rash.   Neurological:      Mental Status: She is alert.      Cranial Nerves: No cranial nerve deficit.      Motor: No abnormal muscle tone.      Deep Tendon Reflexes: Reflexes normal.      Comments: Confused         DIAGNOSTIC DATA:    Results from last 7 days   Lab Units 11/01/21  0708 10/31/21  0544 10/30/21  0846   WBC 10*3/mm3 7.15 6.60 6.62   HEMOGLOBIN g/dL 10.6* 10.1* 10.0*   HEMATOCRIT % 31.6* 30.4* 29.3*   PLATELETS 10*3/mm3 204 185 165      Results from last 7 days   Lab Units 11/01/21  0708 10/31/21  0544 10/30/21  0846   SODIUM mmol/L 145 145 139   POTASSIUM mmol/L 4.0 3.3* 3.8   CHLORIDE mmol/L 108* 110* 104   CO2 mmol/L 26.1 24.3 23.2   BUN mg/dL 15 21 25*   CREATININE mg/dL 1.07* 1.04* 1.36*   CALCIUM mg/dL 9.1 8.5* 8.7   BILIRUBIN  mg/dL 0.4 0.3 0.4   ALK PHOS U/L 59 56 48   ALT (SGPT) U/L 36* 37* 36*   AST (SGOT) U/L 107* 126* 158*   GLUCOSE mg/dL 105* 88 119*            Assessment/Plan   ASSESSMENT:  This is a 81 y.o. female with:    *Dementia with exacerbation by UTI  • CT head 10/29/2021 and MRI brain 10/30/2021 with small vessel disease, diffuse cerebral atrophy  • Patient with history of chronic dementia, resident of [facility]  • Patient with increasing confusion during this hospitalization attributed to current UTI    *UTI  • Currently receiving Rocephin    *Right lower lobe pulmonary nodule  • Patient with history of smoking 1 pack/day, quit in 2010  • Current CT chest 10/31/2021 with solitary 1 x 1.5 cm nodule that appears to be pleural-based in the right posterior lower lobe sulcus.  There are additional calcified granuloma in the right base anteriorly and calcified infracarinal lymph nodes.  There is no mediastinal or hilar adenopathy.    • CT findings are suspicious for potential early stage lung cancer  • Given the patient's underlying dementia, unclear whether we should proceed with any further evaluation or treatment for this issue.  I will need to discuss in the morning with patient's family members regarding degree of investigation they wish to pursue for this issue including possible PET scan and CT-guided biopsy.  Patient would not be a candidate for surgical resection but could be a candidate for stereotactic radiation.  Again, decision to proceed would likely be based on patient's ongoing level of cognitive function and performance status.    *Anemia  • Patient with history of iron deficiency and hemolytic anemia related to severe aortic stenosis.    • Patient did require IV iron in 2016  • Hemoglobin did improve and has remained in the 11-12 range, likely to some extent related to CKD3  • Hemoglobin has declined during hospitalization in the 10-11 range with chronically elevated MCV  • We will send off  additional evaluation with iron panel, ferritin, B12, folate, haptoglobin, LDH.    *Elevated LFTs  • Patient with new elevation in transaminases during current admission  • CT abdomen and pelvis with normal-appearing liver   • Etiology unclear at this time    *AKICKD3  • Baseline creatinine in the 1-1.4 range  • Creatinine did increase to 1.72 on admission 10/29/2021 however returned to baseline      PLAN:   1. Additional labs today with iron panel, ferritin, B12, folate, LDH, haptoglobin  2. In a.m. we will discuss with patient's family regarding pursuit of right lower lobe pulmonary nodule in light of patient's dementia and limited performance status.  3. Daily CBC, CMP    Discussed with patient at bedside      Prem Keys MD

## 2021-11-01 NOTE — PLAN OF CARE
Goal Outcome Evaluation:  Plan of Care Reviewed With: patient        Progress: no change  Outcome Summary: Confused, no complaints of pain, will need SNF at dc, VSS, will continue to monitor.

## 2021-11-01 NOTE — THERAPY TREATMENT NOTE
Patient Name: Dory Hampton  : 1940    MRN: [id number]                              Today's Date: 2021       Admit Date: 10/29/2021    Visit Dx:     ICD-10-CM ICD-9-CM   1. Weakness  R53.1 780.79   2. Acute UTI  N39.0 599.0   3. Acute kidney injury (HCC)  N17.9 584.9   4. Anemia, unspecified type  D64.9 285.9   5. Dementia with behavioral disturbance, unspecified dementia type (HCC)  F03.91 294.21     Patient Active Problem List   Diagnosis   • Essential hypertension   • Hyperlipidemia   • Hypothyroidism, adult   • Iron deficiency anemia due to chronic blood loss   • Macrocytic anemia   • AVM (arteriovenous malformation) of small bowel, acquired with hemorrhage   • S/P AVR   • Cognitive change   • Paroxysmal atrial fibrillation (HCC)   • Sinus bradycardia   • Bilateral carotid artery stenosis   • Generalized weakness   • Dementia (HCC)   • JOSÉ LUIS (acute kidney injury) ([facility])   • Stage 3 chronic kidney disease (HCC)   • Lung nodule   • CAD (coronary artery disease)   • Leukocytosis   • Anemia, chronic disease   • Abnormal LFTs     Past Medical History:   Diagnosis Date   • Abnormal electrocardiogram    • Anemia    • Aortic stenosis     severe; s/p repair   • Asthma     HISTORY   • Bilateral carotid artery stenosis 2020   • Bradycardia    • Coronary artery disease    • Diastolic dysfunction     grade II   • Environmental allergies    • Female stress incontinence    • Hemolytic anemia, mechanical ([facility]) 2016   • History of diverticulosis    • History of transfusion    • Hyperlipidemia    • Hypertension    • Hypokalemia    • Hypothyroid    • Iron deficiency anemia     hemolytic anemia   • Iron deficiency anemia due to chronic blood loss 2016   • Left ventricular hypertrophy    • Murmur    • PAF (paroxysmal atrial fibrillation) ([facility])     with RVR   • Pleural effusion     bilateral, post op   • Psoriasis    • Tendinitis    • Thoracic aorta atherosclerosis ([facility])      Past Surgical History:   Procedure  Laterality Date   • AORTIC VALVE REPAIR/REPLACEMENT N/A 12/16/2016    Procedure: BHARGAVI MINI STERNOTOMY AORTIC VALVE REPLACEMENT;  Surgeon: Robin Jones MD;  Location: [facility] OR;  Service:    • APPENDECTOMY  06/1972   • CAPSULE ENDOSCOPY  2015   • CARDIAC CATHETERIZATION N/A 8/25/2016    Procedure: Coronary angiography;  Surgeon: Lulú Hooper MD;  Location: [facility] CATH INVASIVE LOCATION;  Service:    • CARDIAC CATHETERIZATION N/A 8/25/2016    Procedure: Right Heart Cath;  Surgeon: Lulú Hooper MD;  Location: [facility] CATH INVASIVE LOCATION;  Service:    • CATARACT EXTRACTION Bilateral     Left 12/10/13, Right 12/17/2013   • CATARACT EXTRACTION     • COLONOSCOPY  06/22/2015    Dr. Mohamud   • DENTAL PROCEDURE  2011    Implants   • ENDOSCOPY  06/22/2015    Dr. Mohamud   • ENTEROSCOPY SMALL BOWEL N/A 11/21/2016    Procedure: ENTEROSCOPY SMALL BOWEL WITH APC CAUTERY;  Surgeon: Tj Torres MD;  [facility];  Service:    • PELVIC LAPAROSCOPY     • STERNOTOMY      mini   • TONSILLECTOMY     • TUBAL ABDOMINAL LIGATION        General Information     Row Name 11/01/21 1203          Physical Therapy Time and Intention    Document Type therapy note (daily note)  -CB     Mode of Treatment individual therapy; physical therapy  -CB     Row Name 11/01/21 1203          General Information    Patient Profile Reviewed yes  -CB     Existing Precautions/Restrictions fall  -CB     Row Name 11/01/21 1203          Cognition    Orientation Status (Cognition) oriented to; person  -CB     Row Name 11/01/21 1203          Safety Issues, Functional Mobility    Safety Issues Affecting Function (Mobility) awareness of need for assistance; insight into deficits/self-awareness; judgment; problem-solving; safety precaution awareness; safety precautions follow-through/compliance  -CB     Impairments Affecting Function (Mobility) balance; cognition; endurance/activity tolerance; strength; postural/trunk control  -CB      Comment, Safety Issues/Impairments (Mobility) gait belt and non skid socks  -CB           User Key  (r) = Recorded By, (t) = Taken By, (c) = Cosigned By    Initials Name Provider Type    Jossie Wheeler PT Physical Therapist               Mobility     Row Name 11/01/21 1205          Bed Mobility    Bed Mobility supine-sit; sit-supine  -CB     Supine-Sit [place] (Bed Mobility) moderate assist (50% patient effort); verbal cues  -CB     Sit-Supine [place] (Bed Mobility) moderate assist (50% patient effort); verbal cues  -CB     Assistive Device (Bed Mobility) bed rails; head of bed elevated; leg   -CB     Row Name 11/01/21 1205          Transfers    Comment (Transfers) severe R lateral/posterior lean in standing and sitting EOB  -CB     Row Name 11/01/21 1205          Bed-Chair Transfer    Bed-Chair [place] (Transfers) unable to assess  -CB     Row Name 11/01/21 1205          Sit-Stand Transfer    Sit-Stand [place] (Transfers) moderate assist (50% patient effort); verbal cues  -CB     Assistive Device (Sit-Stand Transfers) walker, front-wheeled  -CB     Row Name 11/01/21 1205          Gait/Stairs (Locomotion)    [place] Level (Gait) unable to assess  -CB           User Key  (r) = Recorded By, (t) = Taken By, (c) = Cosigned By    Initials Name Provider Type    Jossie Wheeler PT Physical Therapist               Obj/Interventions     Row Name 11/01/21 1206          Range of Motion Comprehensive    Comment, General Range of Motion pt able to complete AROM BUE equally.  -CB     Row Name 11/01/21 1206          Strength Comprehensive (MMT)    Comment, General Manual Muscle Testing (MMT) Assessment decreased  strength BUE  -CB     Row Name 11/01/21 1206          Balance    Balance Assessment sitting static balance; sitting dynamic balance; standing static balance; standing dynamic balance  -CB     Static Sitting Balance moderate impairment; supported; sitting, edge of bed; mild  impairment  CGA-modA  -CB     Dynamic Sitting Balance severe impairment; supported; sitting, edge of bed  -CB     Static Standing Balance severe impairment; supported; standing  maxA  -CB     Balance Interventions sitting; standing; sit to stand; supported; dynamic; static; trunk training exercise; weight shifting activity; minimal challenge  -CB     Comment, Balance R lateral and posterior lean in sitting and standing  -CB           User Key  (r) = Recorded By, (t) = Taken By, (c) = Cosigned By    Initials Name Provider Type    Jossie Wheeler PT Physical Therapist               Goals/Plan    No documentation.                Clinical Impression     Row Name 11/01/21 1206          Pain    Additional Documentation Pain Scale: FACES Pre/Post-Treatment (Group)  -CB     Row Name 11/01/21 1206          Pain Scale: FACES Pre/Post-Treatment    Pain: FACES Scale, Pretreatment 0-->no hurt  -CB     Posttreatment Pain Rating 0-->no hurt  -CB     Row Name 11/01/21 1206          Plan of Care Review    Plan of Care Reviewed With patient  -CB     Progress declining  -CB     Outcome Summary Patient is agreeable to PT and AO to self only this AM. She completed supine<>sitting and STS to rwx with modA. Pt with poor sitting and standing balance requiring CGA-modA for static sitting and maxA for static standing balance. Pt with moderate to severe R lateral and posterior lean in sitting and standing. RN notified. Pt will continue to benefit from skilled PT to increase level of independence.  -CB     Row Name 11/01/21 1206          Vital Signs    Post Systolic BP Rehab 153  -CB     Post Treatment Diastolic BP 93  -CB     Row Name 11/01/21 1206          Positioning and Restraints    Pre-Treatment Position in bed  -CB     Post Treatment Position bed  -CB     In Bed fowlers; notified nsg; call light within reach; encouraged to call for assist; exit alarm on; side rails up x3  -CB           User Key  (r) = Recorded By, (t) = Taken By, (c)  = Cosigned By    Initials Name Provider Type    CB Jossie Guerra PT Physical Therapist               Outcome Measures     Row Name 11/01/21 1210          How much help from another person do you currently need...    Turning from your back to your side while in flat bed without using bedrails? 3  -CB     Moving from lying on back to sitting on the side of a flat bed without bedrails? 2  -CB     Moving to and from a bed to a chair (including a wheelchair)? 2  -CB     Standing up from a chair using your arms (e.g., wheelchair, bedside chair)? 2  -CB     Climbing 3-5 steps with a railing? 1  -CB     To walk in hospital room? 1  -CB     AM-PAC 6 Clicks Score (PT) 11  -CB     Row Name 11/01/21 1210          Functional Assessment    Outcome Measure Options AM-PAC 6 Clicks Basic Mobility (PT)  -CB           User Key  (r) = Recorded By, (t) = Taken By, (c) = Cosigned By    Initials Name Provider Type    CB Jossie Guerra PT Physical Therapist                             Physical Therapy Education                 Title: PT OT SLP Therapies (In Progress)     Topic: Physical Therapy (In Progress)     Point: Mobility training (In Progress)     Learning Progress Summary           Patient Acceptance, E,TB,D, NR,NL by  at 11/1/2021 1211                   Point: Home exercise program (Not Started)     Learner Progress:  Not documented in this visit.          Point: Body mechanics (Not Started)     Learner Progress:  Not documented in this visit.          Point: Precautions (In Progress)     Learning Progress Summary           Patient Acceptance, E,TB,D, NR,NL by  at 11/1/2021 1211                               User Key     Initials Effective Dates Name Provider Type Discipline     10/22/21 -  Jossie Guerra PT Physical Therapist PT              PT Recommendation and Plan  Planned Therapy Interventions (PT): balance training, bed mobility training, gait training, home exercise program, patient/family education, postural  re-education, transfer training, strengthening  Plan of Care Reviewed With: patient  Progress: declining  Outcome Summary: Patient is agreeable to PT and AO to self only this AM. She completed supine<>sitting and STS to rwx with modA. Pt with poor sitting and standing balance requiring CGA-modA for static sitting and maxA for static standing balance. Pt with moderate to severe R lateral and posterior lean in sitting and standing. RN notified. Pt will continue to benefit from skilled PT to increase level of independence.     Time Calculation:    PT Charges     Row Name 11/01/21 1214             Time Calculation    Start Time 1056  -CB      Stop Time 1112  -CB      Time Calculation (min) 16 min  -CB      PT Received On 11/01/21  -CB      PT - Next Appointment 11/02/21  -CB              Time Calculation- PT    Total Timed Code Minutes- PT 16 minute(s)  -CB              Timed Charges    25362 - PT Therapeutic Activity Minutes 16  -CB              Total Minutes    Timed Charges Total Minutes 16  -CB       Total Minutes 16  -CB            User Key  (r) = Recorded By, (t) = Taken By, (c) = Cosigned By    Initials Name Provider Type    CB Jossie Guerra, PT Physical Therapist              Therapy Charges for Today     Code Description Service Date Service Provider Modifiers Qty    13645261869 HC PT THERAPEUTIC ACT EA 15 MIN 11/1/2021 Jossie Guerra, PT GP 1          PT G-Codes  Outcome Measure Options: AM-PAC 6 Clicks Basic Mobility (PT)  AM-PAC 6 Clicks Score (PT): 11  AM-PAC 6 Clicks Score (OT): 16  Modified Sebastian Scale: 4 - Moderately severe disability.  Unable to walk without assistance, and unable to attend to own bodily needs without assistance.    Jossie Guerra PT  11/1/2021

## 2021-11-02 LAB
ALBUMIN SERPL-MCNC: 3.3 G/DL (ref 3.5–5.2)
ALBUMIN/GLOB SERPL: 1.4 G/DL
ALP SERPL-CCNC: 51 U/L (ref 39–117)
ALT SERPL W P-5'-P-CCNC: 28 U/L (ref 1–33)
ANION GAP SERPL CALCULATED.3IONS-SCNC: 9 MMOL/L (ref 5–15)
AST SERPL-CCNC: 75 U/L (ref 1–32)
BASOPHILS # BLD AUTO: 0.05 10*3/MM3 (ref 0–0.2)
BASOPHILS NFR BLD AUTO: 0.8 % (ref 0–1.5)
BILIRUB SERPL-MCNC: 0.3 MG/DL (ref 0–1.2)
BUN SERPL-MCNC: 22 MG/DL (ref 8–23)
BUN/CREAT SERPL: 21.6 (ref 7–25)
CALCIUM SPEC-SCNC: 8.8 MG/DL (ref 8.6–10.5)
CHLORIDE SERPL-SCNC: 109 MMOL/L (ref 98–107)
CO2 SERPL-SCNC: 24 MMOL/L (ref 22–29)
CREAT SERPL-MCNC: 1.02 MG/DL (ref 0.57–1)
DEPRECATED RDW RBC AUTO: 61.8 FL (ref 37–54)
EOSINOPHIL # BLD AUTO: 0.15 10*3/MM3 (ref 0–0.4)
EOSINOPHIL NFR BLD AUTO: 2.3 % (ref 0.3–6.2)
ERYTHROCYTE [DISTWIDTH] IN BLOOD BY AUTOMATED COUNT: 16.3 % (ref 12.3–15.4)
FERRITIN SERPL-MCNC: 306 NG/ML (ref 13–150)
FOLATE SERPL-MCNC: >20 NG/ML (ref 4.78–24.2)
GFR SERPL CREATININE-BSD FRML MDRD: 52 ML/MIN/1.73
GLOBULIN UR ELPH-MCNC: 2.4 GM/DL
GLUCOSE SERPL-MCNC: 91 MG/DL (ref 65–99)
HAPTOGLOB SERPL-MCNC: 54 MG/DL (ref 30–200)
HCT VFR BLD AUTO: 30.6 % (ref 34–46.6)
HGB BLD-MCNC: 9.8 G/DL (ref 12–15.9)
IMM GRANULOCYTES # BLD AUTO: 0.02 10*3/MM3 (ref 0–0.05)
IMM GRANULOCYTES NFR BLD AUTO: 0.3 % (ref 0–0.5)
IRON 24H UR-MRATE: 80 MCG/DL (ref 37–145)
IRON SATN MFR SERPL: 32 % (ref 20–50)
LDH SERPL-CCNC: 416 U/L (ref 135–214)
LYMPHOCYTES # BLD AUTO: 1.56 10*3/MM3 (ref 0.7–3.1)
LYMPHOCYTES NFR BLD AUTO: 23.7 % (ref 19.6–45.3)
MCH RBC QN AUTO: 33.1 PG (ref 26.6–33)
MCHC RBC AUTO-ENTMCNC: 32 G/DL (ref 31.5–35.7)
MCV RBC AUTO: 103.4 FL (ref 79–97)
MONOCYTES # BLD AUTO: 0.48 10*3/MM3 (ref 0.1–0.9)
MONOCYTES NFR BLD AUTO: 7.3 % (ref 5–12)
NEUTROPHILS NFR BLD AUTO: 4.31 10*3/MM3 (ref 1.7–7)
NEUTROPHILS NFR BLD AUTO: 65.6 % (ref 42.7–76)
NRBC BLD AUTO-RTO: 0.3 /100 WBC (ref 0–0.2)
PLATELET # BLD AUTO: 181 10*3/MM3 (ref 140–450)
PMV BLD AUTO: 9.9 FL (ref 6–12)
POTASSIUM SERPL-SCNC: 4 MMOL/L (ref 3.5–5.2)
PROT SERPL-MCNC: 5.7 G/DL (ref 6–8.5)
RBC # BLD AUTO: 2.96 10*6/MM3 (ref 3.77–5.28)
SODIUM SERPL-SCNC: 142 MMOL/L (ref 136–145)
TIBC SERPL-MCNC: 250 MCG/DL (ref 298–536)
TRANSFERRIN SERPL-MCNC: 168 MG/DL (ref 200–360)
VIT B12 BLD-MCNC: 1771 PG/ML (ref 211–946)
WBC # BLD AUTO: 6.57 10*3/MM3 (ref 3.4–10.8)

## 2021-11-02 PROCEDURE — 99232 SBSQ HOSP IP/OBS MODERATE 35: CPT | Performed by: INTERNAL MEDICINE

## 2021-11-02 PROCEDURE — 82728 ASSAY OF FERRITIN: CPT | Performed by: INTERNAL MEDICINE

## 2021-11-02 PROCEDURE — 97530 THERAPEUTIC ACTIVITIES: CPT

## 2021-11-02 PROCEDURE — 25010000002 CEFTRIAXONE PER 250 MG: Performed by: INTERNAL MEDICINE

## 2021-11-02 PROCEDURE — 99231 SBSQ HOSP IP/OBS SF/LOW 25: CPT | Performed by: PSYCHIATRY & NEUROLOGY

## 2021-11-02 PROCEDURE — 82746 ASSAY OF FOLIC ACID SERUM: CPT | Performed by: INTERNAL MEDICINE

## 2021-11-02 PROCEDURE — 84466 ASSAY OF TRANSFERRIN: CPT | Performed by: INTERNAL MEDICINE

## 2021-11-02 PROCEDURE — 82607 VITAMIN B-12: CPT | Performed by: INTERNAL MEDICINE

## 2021-11-02 PROCEDURE — 83010 ASSAY OF HAPTOGLOBIN QUANT: CPT | Performed by: INTERNAL MEDICINE

## 2021-11-02 PROCEDURE — 83540 ASSAY OF IRON: CPT | Performed by: INTERNAL MEDICINE

## 2021-11-02 PROCEDURE — 83615 LACTATE (LD) (LDH) ENZYME: CPT | Performed by: INTERNAL MEDICINE

## 2021-11-02 PROCEDURE — 80053 COMPREHEN METABOLIC PANEL: CPT | Performed by: INTERNAL MEDICINE

## 2021-11-02 PROCEDURE — 85025 COMPLETE CBC W/AUTO DIFF WBC: CPT | Performed by: INTERNAL MEDICINE

## 2021-11-02 PROCEDURE — 25010000002 ENOXAPARIN PER 10 MG: Performed by: NURSE PRACTITIONER

## 2021-11-02 RX ORDER — OLANZAPINE 2.5 MG/1
2.5 TABLET ORAL
Status: DISCONTINUED | OUTPATIENT
Start: 2021-11-02 | End: 2021-11-04 | Stop reason: HOSPADM

## 2021-11-02 RX ORDER — SODIUM CHLORIDE 9 MG/ML
50 INJECTION, SOLUTION INTRAVENOUS CONTINUOUS
Status: DISCONTINUED | OUTPATIENT
Start: 2021-11-02 | End: 2021-11-03

## 2021-11-02 RX ADMIN — OLANZAPINE 2.5 MG: 2.5 TABLET ORAL at 17:48

## 2021-11-02 RX ADMIN — ENOXAPARIN SODIUM 40 MG: 40 INJECTION SUBCUTANEOUS at 17:48

## 2021-11-02 RX ADMIN — LEVOTHYROXINE SODIUM 50 MCG: 0.05 TABLET ORAL at 09:39

## 2021-11-02 RX ADMIN — CEFTRIAXONE 1 G: 1 INJECTION, POWDER, FOR SOLUTION INTRAMUSCULAR; INTRAVENOUS at 17:48

## 2021-11-02 RX ADMIN — Medication 1 TABLET: at 09:39

## 2021-11-02 RX ADMIN — AZELASTINE HYDROCHLORIDE 1 SPRAY: 137 SPRAY, METERED NASAL at 09:39

## 2021-11-02 RX ADMIN — ASPIRIN 81 MG: 81 TABLET, COATED ORAL at 09:39

## 2021-11-02 RX ADMIN — Medication 100 MG: at 09:39

## 2021-11-02 RX ADMIN — CETIRIZINE HYDROCHLORIDE 5 MG: 10 TABLET ORAL at 09:39

## 2021-11-02 RX ADMIN — Medication 1000 MCG: at 09:39

## 2021-11-02 RX ADMIN — SODIUM CHLORIDE 50 ML/HR: 9 INJECTION, SOLUTION INTRAVENOUS at 20:52

## 2021-11-02 NOTE — PLAN OF CARE
Goal Outcome Evaluation:      Vitals stable. Patient very lethargic today- A Dr Watson and Carlene Thompson aware. Patient was able to open eyes this morning with encouragement and took morning meds, however went back to sleep after. Not talking, will move lips but not get any words out when aroused. Did not eat anything today beside applesauce with medications. Neurology re-consulted due to lethargy. CT scan of the head pending.

## 2021-11-02 NOTE — PROGRESS NOTES
Continued Stay Note  [facility]     Patient Name: Dory Hampton  MRN: [id number]  Today's Date: 11/2/2021    Admit Date: 10/29/2021     Discharge Plan     Row Name 11/02/21 1518       Plan    Plan snf    Plan Comments s/w pt's son, Toñito, [phone], rehab choices are 1. [facility] 2. [facility] and 3. [facility]. Referrals sent accordingly. CCP will follow up.               Discharge Codes    No documentation.               Expected Discharge Date and Time     Expected Discharge Date Expected Discharge Time    Nov 4, 2021             Noemy Kahn RN

## 2021-11-02 NOTE — PLAN OF CARE
Goal Outcome Evaluation:  Plan of Care Reviewed With: patient        Progress: no change  Outcome Summary: Patient alert to self only, talking to people that aren't there, VSS, meds crushed in applesauce, feeder, Hem/Onc consulted for lung nodule, labs ordered, Hemo/Onc would like to speak to family about plan of care, will CTM

## 2021-11-02 NOTE — PROGRESS NOTES
[facility] INPATIENT PROGRESS NOTE    Length of Stay:  4 days    CHIEF COMPLAINT: Dementia, UTI, right lower lobe pulmonary nodule, anemia, elevated LFTs, JOSÉ LUIS/CKD3      SUBJECTIVE: Patient very lethargic.  She will open her eyes to command and move her lips but is not able to communicate.      ROS:  Review of Systems unable to obtain as patient is lethargic and not communicative    OBJECTIVE:  Vitals:    11/01/21 1355 11/01/21 1904 11/01/21 2319 11/02/21 0733   BP: 135/73 161/76 163/76 137/70   BP Location: Right arm Right arm Right arm Right arm   Patient Position: Lying Lying Lying Lying   Pulse: 77 75 66 66   Resp: 18 18 17 16   Temp: 97.9 °F (36.6 °C) 97.7 °F (36.5 °C) 98.5 °F (36.9 °C) 98.5 °F (36.9 °C)   TempSrc: Oral Oral Oral Oral   SpO2: 98% 97% 95% 95%   Weight:       Height:             PHYSICAL EXAMINATION:  General: Elderly female lying in bed no distress, lethargic, not communicative  Chest/Lungs: Clear to auscultation bilaterally  Heart: Regular rate and rhythm with 2/6 murmur  Abdomen/GI: Soft nontender nondistended bowel sounds present  Extremities: No edema    DIAGNOSTIC DATA:  Results Review:     I reviewed the patient's new clinical results.    Results from last 7 days   Lab Units 11/02/21  0615 11/01/21  0708 10/31/21  0544   WBC 10*3/mm3 6.57 7.15 6.60   HEMOGLOBIN g/dL 9.8* 10.6* 10.1*   HEMATOCRIT % 30.6* 31.6* 30.4*   PLATELETS 10*3/mm3 181 204 185      Results from last 7 days   Lab Units 11/02/21  0615 11/01/21  0708 10/31/21  0544   SODIUM mmol/L 142 145 145   POTASSIUM mmol/L 4.0 4.0 3.3*   CHLORIDE mmol/L 109* 108* 110*   CO2 mmol/L 24.0 26.1 24.3   BUN mg/dL 22 15 21   CREATININE mg/dL 1.02* 1.07* 1.04*   CALCIUM mg/dL 8.8 9.1 8.5*   BILIRUBIN mg/dL 0.3 0.4 0.3   ALK PHOS U/L 51 59 56   ALT (SGPT) U/L 28 36* 37*   AST (SGOT) U/L 75* 107* 126*   GLUCOSE mg/dL 91 105* 88      Lab Results   Component Value Date    NEUTROABS 4.31 11/02/2021     Results from last 7 days   Lab  Units 10/29/21  1414   INR  0.99     Results from last 7 days   Lab Units 10/29/21  1414   MAGNESIUM mg/dL 2.3         Assessment/Plan   ASSESSMENT/PLAN:  This is a 81 y.o. female with:     *Dementia with metabolic encephalopathy  · CT head 10/29/2021 and MRI brain 10/30/2021 with small vessel disease, diffuse cerebral atrophy  · Patient with history of chronic dementia, resident of [facility]  · Patient with increasing confusion during this hospitalization with metabolic encephalopathy possibly attributed to current UTI  · Patient is being followed by neurology, plans for repeat CT head today.     *Possible UTI  · Patient completing course of Rocephin     *Right lower lobe pulmonary nodule  · Patient with history of smoking 1 pack/day, quit in 2010  · Current CT chest 10/31/2021 with solitary 1 x 1.5 cm nodule that appears to be pleural-based in the right posterior lower lobe sulcus.  There are additional calcified granuloma in the right base anteriorly and calcified infracarinal lymph nodes.  There is no mediastinal or hilar adenopathy.    · CT findings are suspicious for potential early stage lung cancer  · Given the patient's underlying chronic dementia and current metabolic encephalopathy, she is currently lethargic and noncommunicative.  I discussed the situation with the patient's son on the telephone today.  We are in agreement that we would not recommend any further work-up or evaluation of this issue at this time.  If she improves back to her prior functional baseline we would consider possible future follow-up CT chest to reassess.     *Anemia  · Patient with history of iron deficiency and hemolytic anemia related to severe aortic stenosis.    · Patient did require IV iron in 2016  · Hemoglobin did improve and has remained in the 11-12 range, likely to some extent related to CKD3  · Hemoglobin has declined during hospitalization in the 10-11 range with chronically elevated  MCV  · Additional evaluation on 11/2/2021 with iron 80, ferritin 306, iron saturation 32%, TIBC 250, folate greater than 20, B12 1771, haptoglobin 54, .  · No evidence to suggest ongoing hemolysis.  Labs consistent with anemia secondary to chronic disease.  · Hemoglobin today 9.8.     *Elevated LFTs  · Patient with new elevation in transaminases during current admission  · CT abdomen and pelvis with normal-appearing liver   · Etiology unclear at this time     *JOSÉ LUIS/CKD3  · Baseline creatinine in the 1-1.4 range  · Creatinine did increase to 1.72 on admission 10/29/2021 however returned to baseline        PLAN:   1. In light of patient's dementia and metabolic encephalopathy we are not going to pursue further evaluation of right lower lobe nodule at this time.  If patient improves back to her prior baseline could consider follow-up CT chest in the future.  2. Daily CBC, CMP    Discussed with patient's son on the telephone today.             Prem Keys MD

## 2021-11-02 NOTE — PROGRESS NOTES
Name: Dory Hampton ADMIT: 10/29/2021   : 1940  PCP: Shantel Michelle MD    MRN: [id number] LOS: 4 days   AGE/SEX: 81 y.o. female  ROOM: [facility]     Subjective   Subjective   Resting in bed. No family present. She is alert and sitting up in bed. Nursing reports she was very lethargic earlier and wasn't eating. She was more confused yesterday as well and daughter was concerned. Hematology/oncology saw and plans to speak with family today to see how aggressive they wish to be with her lung nodule. Otherwise, needs SNF over her NICO[facility] for now. Patient denies any overt complaints at this time, but is also a poor historian.     Objective   Objective   Vital Signs  Temp:  [97.7 °F (36.5 °C)-98.5 °F (36.9 °C)] 98.5 °F (36.9 °C)  Heart Rate:  [66-77] 66  Resp:  [16-18] 16  BP: (135-163)/(70-76) 137/70  SpO2:  [95 %-98 %] 95 %  on   ;   Device (Oxygen Therapy): room air  Body mass index is 18.96 kg/m².     Physical Exam  Vitals and nursing note reviewed.   Constitutional:       Appearance: She is ill-appearing. She is not toxic-appearing.   Cardiovascular:      Rate and Rhythm: Normal rate and regular rhythm.      Pulses: Normal pulses.      Heart sounds: Normal heart sounds.   Pulmonary:      Effort: Pulmonary effort is normal. No respiratory distress.      Breath sounds: Normal breath sounds.   Abdominal:      General: Bowel sounds are normal. There is no distension.      Palpations: Abdomen is soft.      Tenderness: There is no abdominal tenderness.   Musculoskeletal:         General: No swelling or tenderness. Normal range of motion.      Cervical back: Normal range of motion and neck supple.   Skin:     General: Skin is warm and dry.      Findings: No bruising. Pale.  Neurological:      General: No focal deficit present.      Mental Status: She is alert and oriented to person/place/year. Seems generally weak. Soft spoken.     Sensory: No sensory deficit.      Motor: Weakness present.      Coordination:  Coordination normal.   Psychiatric:         Mood and Affect: Mood normal.         Behavior: Behavior normal    Results Review:       I reviewed the patient's new clinical results.  Results from last 7 days   Lab Units 11/02/21  0615 11/01/21  0708 10/31/21  0544 10/30/21  0846   WBC 10*3/mm3 6.57 7.15 6.60 6.62   HEMOGLOBIN g/dL 9.8* 10.6* 10.1* 10.0*   PLATELETS 10*3/mm3 181 204 185 165     Results from last 7 days   Lab Units 11/02/21 0615 11/01/21 0708 10/31/21  0544 10/30/21  0846   SODIUM mmol/L 142 145 145 139   POTASSIUM mmol/L 4.0 4.0 3.3* 3.8   CHLORIDE mmol/L 109* 108* 110* 104   CO2 mmol/L 24.0 26.1 24.3 23.2   BUN mg/dL 22 15 21 25*   CREATININE mg/dL 1.02* 1.07* 1.04* 1.36*   GLUCOSE mg/dL 91 105* 88 119*   Estimated Creatinine Clearance: 34.2 mL/min (A) (by C-G formula based on SCr of 1.02 mg/dL (H)).  Results from last 7 days   Lab Units 11/02/21  0615 11/01/21  0708 10/31/21  0544 10/30/21  0846   ALBUMIN g/dL 3.30* 3.70 3.70 3.70   BILIRUBIN mg/dL 0.3 0.4 0.3 0.4   ALK PHOS U/L 51 59 56 48   AST (SGOT) U/L 75* 107* 126* 158*   ALT (SGPT) U/L 28 36* 37* 36*     Results from last 7 days   Lab Units 11/02/21 0615 11/01/21  0708 10/31/21  0544 10/30/21  0846 10/29/21  1414 10/29/21  1414   CALCIUM mg/dL 8.8 9.1 8.5* 8.7   < > 9.2   ALBUMIN g/dL 3.30* 3.70 3.70 3.70   < > 4.10   MAGNESIUM mg/dL  --   --   --   --   --  2.3    < > = values in this interval not displayed.     Results from last 7 days   Lab Units 10/30/21  1022 10/30/21  0846 10/29/21  1745   PROCALCITONIN ng/mL  --   --  2.74*   LACTATE mmol/L 1.9 2.2* 0.9     No results found for: HGBA1C, POCGLU    aspirin, 81 mg, Oral, Daily  azelastine, 1 spray, Each Nare, BID  cefTRIAXone, 1 g, Intravenous, Q24H  cetirizine, 5 mg, Oral, Daily  donepezil, 10 mg, Oral, Nightly  levothyroxine, 50 mcg, Oral, Daily  multivitamin with minerals, 1 tablet, Oral, Daily  sodium chloride, 10 mL, Intravenous, Q12H  vitamin B-12, 1,000 mcg, Oral, Daily  vitamin  B-6, 100 mg, Oral, Daily       Diet Regular; Cardiac       Assessment/Plan     Active Hospital Problems    Diagnosis  POA   • **Generalized weakness [R53.1]  Yes   • Lung nodule [R91.1]  Yes   • CAD (coronary artery disease) [I25.10]  Yes   • Leukocytosis [D72.829]  Yes   • Anemia, chronic disease [D63.8]  Yes   • Abnormal LFTs [R94.5]  Yes   • Dementia (HCC) [F03.90]  Yes   • JOSÉ LUIS (acute kidney injury) ([facility]) [N17.9]  Yes   • Stage 3 chronic kidney disease (HCC) [N18.30]  Yes   • Paroxysmal atrial fibrillation ([facility]) [I48.0]  Yes   • Cognitive change [R41.89]  Yes   • S/P AVR [Z95.2]  Not Applicable   • Essential hypertension [I10]  Yes      Resolved Hospital Problems   No resolved problems to display.     Ms. Hampton is a 81 year old female who presented to the hospital after she was found down and confused at her facility. There was some concern for leftward lean by staff.     · Generalized weakness: Multifactorial at this point. CT head negative. Neurology was consulted and MRI negative for acute abnormalities so neuro signed off. Outpatient EMG/NCS per neuro. Now with some lethargy and concerns for functional decline as well as worsening mental state- will ask Neurology to re-evaluate.   · Leukocytosis: Resolved. Had higher than normal procal. Continue empiric abx for presumed infection at this point- 5 days total. She did report increased urination, but UA is not that impressive. CT A/P negative, but did show a lung nodule. CT chest confirms nodule with some concerns for malignancy (which could also explain elevated procal). Oncology to speak with family on how aggressive they wish to be.  · CAD/s/p AVR/PAF: NSR with no recurrence of afib since her valve replacement. She is followed by Dr. Bryan.  · JOSÉ LUIS on CKD3: Creatinine up to 1.7 with baseline closer to 1.3. Creatinine better than baseline here.  · Dementia: Now with some signs of delirium versus altered mental status changes. Neuro to see again- ? MRI with  contrast given possible underlying malignancy. Await their input.  · HTN: BP stable. Monitor.   · Anemia: Chronic and stable. Monitor trends.  · Abnormal LFTs: Trending down.  · Hypokalemia: Replete.     Lethargy and AMS likely multifactorial d/t underlying dementia, possible infection/maligancy and deconditioning. She is certainly weaker than admission. MRI brain was negative for stroke. However, now with some concerns for lung cancer. ? MRI with contrast. Neurology has been re-consulted. Will await their input as well as oncology's.    Discussed with patient, nurse and Dr. Watson.     VTE Prophylaxis - SCDs  Code Status - Full code  Disposition - Anticipate discharge next couple days pending progress. Needs SNF.      KEVIN Garcia  [facility]  11/02/21  13:27 EDT

## 2021-11-02 NOTE — PLAN OF CARE
Pt participation in OT/PT this morning. Pt with a large functional decline compared to previous day OT/PT notes. Pt lethargic and demonstrated difficulty remaining attentive to therapists. R side drool present down pt's cheek. Mod-Max A x2 to transfer pt to EOB. Large fluctuation in assist levels provided for sitting balance, CGA->Max A due to a strong R lateral/posterior lean. Pt with slightly worst coordination and strength in RUE compared to LUE. Pt stood with Mod-Max A x2 at EOB. Pt with eyes closed during this time and cues provided to keep head up. Pt assisted back to supine with Max A x2. Pt unable to eat post session due to lethargic state. RN notified. SNF recommended at d/c.    Patient was wearing a face mask during this therapy encounter. Therapist used appropriate personal protective equipment including mask, goggles and gloves. Mask used was standard procedure mask. Appropriate PPE was worn during the entire therapy session. Hand hygiene was completed before and after therapy session. Patient is not in enhanced droplet precautions.

## 2021-11-02 NOTE — THERAPY TREATMENT NOTE
Patient Name: Dory Hampton  : 1940    MRN: [id number]                              Today's Date: 2021       Admit Date: 10/29/2021    Visit Dx:     ICD-10-CM ICD-9-CM   1. Weakness  R53.1 780.79   2. Acute UTI  N39.0 599.0   3. Acute kidney injury (HCC)  N17.9 584.9   4. Anemia, unspecified type  D64.9 285.9   5. Dementia with behavioral disturbance, unspecified dementia type (HCC)  F03.91 294.21     Patient Active Problem List   Diagnosis   • Essential hypertension   • Hyperlipidemia   • Hypothyroidism, adult   • Iron deficiency anemia due to chronic blood loss   • Macrocytic anemia   • AVM (arteriovenous malformation) of small bowel, acquired with hemorrhage   • S/P AVR   • Cognitive change   • Paroxysmal atrial fibrillation (HCC)   • Sinus bradycardia   • Bilateral carotid artery stenosis   • Generalized weakness   • Dementia (HCC)   • JOSÉ LUIS (acute kidney injury) ([facility])   • Stage 3 chronic kidney disease (HCC)   • Lung nodule   • CAD (coronary artery disease)   • Leukocytosis   • Anemia, chronic disease   • Abnormal LFTs     Past Medical History:   Diagnosis Date   • Abnormal electrocardiogram    • Anemia    • Aortic stenosis     severe; s/p repair   • Asthma     HISTORY   • Bilateral carotid artery stenosis 2020   • Bradycardia    • Coronary artery disease    • Diastolic dysfunction     grade II   • Environmental allergies    • Female stress incontinence    • Hemolytic anemia, mechanical ([facility]) 2016   • History of diverticulosis    • History of transfusion    • Hyperlipidemia    • Hypertension    • Hypokalemia    • Hypothyroid    • Iron deficiency anemia     hemolytic anemia   • Iron deficiency anemia due to chronic blood loss 2016   • Left ventricular hypertrophy    • Murmur    • PAF (paroxysmal atrial fibrillation) ([facility])     with RVR   • Pleural effusion     bilateral, post op   • Psoriasis    • Tendinitis    • Thoracic aorta atherosclerosis ([facility])      Past Surgical History:   Procedure  Laterality Date   • AORTIC VALVE REPAIR/REPLACEMENT N/A 12/16/2016    Procedure: BHARGAVI MINI STERNOTOMY AORTIC VALVE REPLACEMENT;  Surgeon: Robin Jones MD;  Location: [facility] OR;  Service:    • APPENDECTOMY  06/1972   • CAPSULE ENDOSCOPY  2015   • CARDIAC CATHETERIZATION N/A 8/25/2016    Procedure: Coronary angiography;  Surgeon: Lulú Hooper MD;  Location: [facility] CATH INVASIVE LOCATION;  Service:    • CARDIAC CATHETERIZATION N/A 8/25/2016    Procedure: Right Heart Cath;  Surgeon: Lulú Hooper MD;  [facility];  Service:    • CATARACT EXTRACTION Bilateral     Left 12/10/13, Right 12/17/2013   • CATARACT EXTRACTION     • COLONOSCOPY  06/22/2015    Dr. Mohamud   • DENTAL PROCEDURE  2011    Implants   • ENDOSCOPY  06/22/2015    Dr. Mohamud   • ENTEROSCOPY SMALL BOWEL N/A 11/21/2016    Procedure: ENTEROSCOPY SMALL BOWEL WITH APC CAUTERY;  Surgeon: Tj Torres MD;  Location:  [facility];  Service:    • PELVIC LAPAROSCOPY     • STERNOTOMY      mini   • TONSILLECTOMY     • TUBAL ABDOMINAL LIGATION        General Information     Row Name 11/02/21 1257          OT Time and Intention    Document Type therapy note (daily note)  -RB     Mode of Treatment co-treatment; occupational therapy; physical therapy  -RB     Row Name 11/02/21 1257          General Information    Patient Profile Reviewed yes  -RB     Existing Precautions/Restrictions fall  -RB     Row Name 11/02/21 1257          Cognition    Orientation Status (Cognition) oriented to; person  -RB     Row Name 11/02/21 1257          Safety Issues, Functional Mobility    Safety Issues Affecting Function (Mobility) ability to follow commands; awareness of need for assistance; insight into deficits/self-awareness; judgment; problem-solving; safety precaution awareness; safety precautions follow-through/compliance; sequencing abilities  -RB     Impairments Affecting Function (Mobility) balance; cognition; coordination;  endurance/activity tolerance; postural/trunk control; pain; range of motion (ROM); strength  -RB           User Key  (r) = Recorded By, (t) = Taken By, (c) = Cosigned By    Initials Name Provider Type    Elizabeth Lin OT Occupational Therapist                 Mobility/ADL's     Row Name 11/02/21 1258          Bed Mobility    Bed Mobility supine-sit; sit-supine  -RB     Supine-Sit Grayson (Bed Mobility) moderate assist (50% patient effort); 2 person assist; verbal cues; nonverbal cues (demo/gesture); maximum assist (25% patient effort)  -RB     Sit-Supine Grayson (Bed Mobility) 2 person assist; verbal cues; nonverbal cues (demo/gesture); maximum assist (25% patient effort); moderate assist (50% patient effort)  -RB     Bed Mobility, Safety Issues decreased use of arms for pushing/pulling; impaired trunk control for bed mobility; decreased use of legs for bridging/pushing; cognitive deficits limit understanding  -RB     Assistive Device (Bed Mobility) bed rails; draw sheet; head of bed elevated  -RB     Row Name 11/02/21 1258          Transfers    Transfers sit-stand transfer  -RB     Comment (Transfers) Strong R lateral/posterior lean  -RB     Sit-Stand Grayson (Transfers) moderate assist (50% patient effort); maximum assist (25% patient effort); verbal cues; nonverbal cues (demo/gesture)  -RB     Row Name 11/02/21 1258          Sit-Stand Transfer    Assistive Device (Sit-Stand Transfers) walker, front-wheeled  gait belt  -RB     Row Name 11/02/21 1258          Functional Mobility    Functional Mobility- Ind. Level not tested; unable to perform  -RB     Row Name 11/02/21 1258          Activities of Daily Living    BADL Assessment/Intervention --  Unsafe to complete self feeding today due to lethargic state  -RB           User Key  (r) = Recorded By, (t) = Taken By, (c) = Cosigned By    Initials Name Provider Type    Elizabeth Lin OT Occupational Therapist               Obj/Interventions      Row Name 11/02/21 1259          Balance    Static Sitting Balance moderate impairment; supported; sitting, edge of bed; unsupported  -RB     Dynamic Sitting Balance supported; severe impairment; sitting, edge of bed; unsupported  -RB     Static Standing Balance severe impairment; supported; standing  -RB     Balance Interventions occupation based/functional task  -RB           User Key  (r) = Recorded By, (t) = Taken By, (c) = Cosigned By    Initials Name Provider Type    RB Elizabeth Lee, JERMAIN Occupational Therapist               Goals/Plan    No documentation.                Clinical Impression     Row Name 11/02/21 1300          Pain Scale: Numbers Pre/Post-Treatment    Pretreatment Pain Rating 0/10 - no pain  -RB     Posttreatment Pain Rating 3/10  -RB     Pain Location - Side Left  -RB     Pain Location --  shin and bottom of L foot  -RB     Pain Intervention(s) Repositioned  -RB     Row Name 11/02/21 1300          Plan of Care Review    Plan of Care Reviewed With patient  -RB     Progress declining  -RB     Row Name 11/02/21 1300          Therapy Assessment/Plan (OT)    Rehab Potential (OT) good, to achieve stated therapy goals  -RB     Criteria for Skilled Therapeutic Interventions Met (OT) yes; skilled treatment is necessary  -RB     Therapy Frequency (OT) 5 times/wk  -RB     Row Name 11/02/21 1300          Therapy Plan Review/Discharge Plan (OT)    Anticipated Discharge Disposition (OT) skilled nursing facility  -RB     Row Name 11/02/21 1300          Vital Signs    O2 Delivery Pre Treatment room air  -RB     O2 Delivery Intra Treatment room air  -RB     O2 Delivery Post Treatment room air  -RB     Pre Patient Position Supine  -RB     Intra Patient Position Standing  -RB     Post Patient Position Supine  -RB     Row Name 11/02/21 1300          Positioning and Restraints    Pre-Treatment Position in bed  -RB     Post Treatment Position bed  -RB     In Bed notified nsg; supine; fowlers; call light  within reach; encouraged to call for assist; exit alarm on  -RB           User Key  (r) = Recorded By, (t) = Taken By, (c) = Cosigned By    Initials Name Provider Type    Elizabeth Lin OT Occupational Therapist               Outcome Measures     Row Name 11/02/21 1204          How much help from another person do you currently need...    Turning from your back to your side while in flat bed without using bedrails? 3  -CB     Moving from lying on back to sitting on the side of a flat bed without bedrails? 2  -CB     Moving to and from a bed to a chair (including a wheelchair)? 1  -CB     Standing up from a chair using your arms (e.g., wheelchair, bedside chair)? 1  -CB     Climbing 3-5 steps with a railing? 1  -CB     To walk in hospital room? 1  -CB     AM-PAC 6 Clicks Score (PT) 9  -CB     Row Name 11/02/21 1204          Functional Assessment    Outcome Measure Options AM-PAC 6 Clicks Basic Mobility (PT)  -CB           User Key  (r) = Recorded By, (t) = Taken By, (c) = Cosigned By    Initials Name Provider Type    Jossie Wheeler PT Physical Therapist                Occupational Therapy Education                 Title: PT OT SLP Therapies (Done)     Topic: Occupational Therapy (Done)     Point: ADL training (Done)     Description:   Instruct learner(s) on proper safety adaptation and remediation techniques during self care or transfers.   Instruct in proper use of assistive devices.              Learning Progress Summary           Patient Acceptance, E,TB,ANT, TERRI,CHARAN,NR by  at 11/1/2021 2044    Acceptance, E, TERRI by  at 10/30/2021 1544    Comment: role of OT, d/c planning, safety                   Point: Home exercise program (Done)     Description:   Instruct learner(s) on appropriate technique for monitoring, assisting and/or progressing therapeutic exercises/activities.              Learning Progress Summary           Patient Acceptance, E,TB,ANT, TERRI,CHARAN,NR by  at 11/1/2021 2044                    Point: Precautions (Done)     Description:   Instruct learner(s) on prescribed precautions during self-care and functional transfers.              Learning Progress Summary           Patient Acceptance, E,TB,D, VU,DU,NR by  at 11/1/2021 2044    Acceptance, E, VU by  at 10/30/2021 1544    Comment: role of OT, d/c planning, safety                   Point: Body mechanics (Done)     Description:   Instruct learner(s) on proper positioning and spine alignment during self-care, functional mobility activities and/or exercises.              Learning Progress Summary           Patient Acceptance, E,TB,D, VU,DU,NR by  at 11/1/2021 2044    Acceptance, E, VU by JACKI at 10/30/2021 1544    Comment: role of OT, d/c planning, safety                               User Key     Initials Effective Dates Name Provider Type Discipline     06/16/21 -  Christelle Leija, RN Registered Nurse Nurse     06/10/21 -  Addis Mcfadden OT Occupational Therapist OT              OT Recommendation and Plan  Therapy Frequency (OT): 5 times/wk  Plan of Care Review  Plan of Care Reviewed With: patient  Progress: declining     Time Calculation:    Time Calculation- OT     Row Name 11/02/21 1257             Time Calculation- OT    OT Start Time 1037  -RB      OT Stop Time 1103  -RB      OT Time Calculation (min) 26 min  -RB      OT - Next Appointment 11/03/21  -RB              Timed Charges    27045 - OT Therapeutic Activity Minutes 26  -RB              Total Minutes    Timed Charges Total Minutes 26  -RB       Total Minutes 26  -RB            User Key  (r) = Recorded By, (t) = Taken By, (c) = Cosigned By    Initials Name Provider Type    RB Elizabeth Lee OT Occupational Therapist              Therapy Charges for Today     Code Description Service Date Service Provider Modifiers Qty    [id number]  OT THERAPEUTIC ACT EA 15 MIN 11/2/2021 Elizabeth Lee OT GO 2               Elizabeth Lee OT  11/2/2021

## 2021-11-02 NOTE — PROGRESS NOTES
Patient Identification:  NAME:  Dory Hampton  Age:  81 y.o.   Sex:  female   :  1940   MRN:  [id number]       Chief complaint: Dementia, metabolic encephalopathy, vascular parkinsonism    History of present illness: She was initially asleep but awakens and carries on a little bit of a conversation with me in a whisper voice.  Recall the MRI by my independent eyeball review shows chronic changes only      Past medical history:  Past Medical History:   Diagnosis Date   • Abnormal electrocardiogram    • Anemia    • Aortic stenosis     severe; s/p repair   • Asthma     HISTORY   • Bilateral carotid artery stenosis 2020   • Bradycardia    • Coronary artery disease    • Diastolic dysfunction     grade II   • Environmental allergies    • Female stress incontinence    • Hemolytic anemia, mechanical (HCC) 2016   • History of diverticulosis    • History of transfusion    • Hyperlipidemia    • Hypertension    • Hypokalemia    • Hypothyroid    • Iron deficiency anemia     hemolytic anemia   • Iron deficiency anemia due to chronic blood loss 2016   • Left ventricular hypertrophy    • Murmur    • PAF (paroxysmal atrial fibrillation) ([facility])     with RVR   • Pleural effusion     bilateral, post op   • Psoriasis    • Tendinitis    • Thoracic aorta atherosclerosis ([facility])        Allergies:  Eggs or egg-derived products, Formaldehyde, Other, Penicillins, and Sulfa antibiotics    Home medications:  Medications Prior to Admission   Medication Sig Dispense Refill Last Dose   • aspirin 81 MG EC tablet Take 1 tablet by mouth Daily.   10/28/2021 at Unknown time   • azelastine (ASTELIN) 0.1 % nasal spray INSTILL 2 SPRAYS INTO EACH NOSTRIL TWICE DAILY 30 mL 1 10/28/2021 at Unknown time   • Cyanocobalamin (VITAMIN B-12 PO) Take 1,000 mcg by mouth daily.   10/28/2021 at Unknown time   • donepezil (ARICEPT) 10 MG tablet TAKE 1 TABLET BY MOUTH EVERY NIGHT 90 tablet 0 10/28/2021 at Unknown time   • loratadine (Claritin) 10 MG  tablet Take 10 mg by mouth Daily.   10/28/2021 at Unknown time   • losartan-hydrochlorothiazide (HYZAAR) 100-25 MG per tablet TAKE 1 TABLET DAILY 90 tablet 3 10/28/2021 at Unknown time   • Multiple Vitamins-Minerals (CENTRUM SILVER PO) Take 1 tablet by mouth Daily. PT HOLDING FOR SURGERY   10/28/2021 at Unknown time   • Pyridoxine HCl (VITAMIN B6 PO) Take 100 mg by mouth daily.   10/28/2021 at Unknown time   • Synthroid 50 MCG tablet TAKE 1 TABLET BY MOUTH DAILY 90 tablet 1 10/28/2021 at Unknown time        Hospital medications:  aspirin, 81 mg, Oral, Daily  azelastine, 1 spray, Each Nare, BID  cefTRIAXone, 1 g, Intravenous, Q24H  cetirizine, 5 mg, Oral, Daily  donepezil, 10 mg, Oral, Nightly  levothyroxine, 50 mcg, Oral, Daily  multivitamin with minerals, 1 tablet, Oral, Daily  sodium chloride, 10 mL, Intravenous, Q12H  vitamin B-12, 1,000 mcg, Oral, Daily  vitamin B-6, 100 mg, Oral, Daily         •  acetaminophen **OR** acetaminophen **OR** acetaminophen  •  hydrALAZINE  •  nitroglycerin  •  ondansetron  •  sodium chloride  •  [COMPLETED] Insert peripheral IV **AND** sodium chloride  •  sodium chloride      Objective:  Vitals Ranges:   Temp:  [97.7 °F (36.5 °C)-98.5 °F (36.9 °C)] 98.2 °F (36.8 °C)  Heart Rate:  [56-75] 56  Resp:  [16-18] 16  BP: (133-163)/(70-89) 133/89      Physical Exam:  Initially asleep but she awakens and talks to me in a whisper voice she is completely disoriented she is smiling and very pleasant normal cranial nerves II through VII she has mild rigidity bradykinesia but no resting tremor reflexes absent toes downgoing    Results review:   I reviewed the patient's new clinical results.    Data review:  Lab Results (last 24 hours)     Procedure Component Value Units Date/Time    Folate [[id number]]  (Normal) Collected: 11/02/21 0615    Specimen: Blood Updated: 11/02/21 0734     Folate >20.00 ng/mL     Narrative:      Results may be falsely increased if patient taking Biotin.      Vitamin B12  [[id number]]  (Abnormal) Collected: 11/02/21 0615    Specimen: Blood Updated: 11/02/21 0734     Vitamin B-12 1,771 pg/mL     Narrative:      Results may be falsely increased if patient taking Biotin.      Lactate Dehydrogenase [[id number]]  (Abnormal) Collected: 11/02/21 0615    Specimen: Blood Updated: 11/02/21 0726      U/L      Comment: Specimen hemolyzed.  Results may be affected.       Haptoglobin [[id number]]  (Normal) Collected: 11/02/21 0615    Specimen: Blood Updated: 11/02/21 0725     Haptoglobin 54 mg/dL      Comment: Specimen hemolyzed. Results may be affected.       Comprehensive Metabolic Panel [[id number]]  (Abnormal) Collected: 11/02/21 0615    Specimen: Blood Updated: 11/02/21 0725     Glucose 91 mg/dL      BUN 22 mg/dL      Creatinine 1.02 mg/dL      Sodium 142 mmol/L      Potassium 4.0 mmol/L      Comment: Slight hemolysis detected by analyzer. Results may be affected.        Chloride 109 mmol/L      CO2 24.0 mmol/L      Calcium 8.8 mg/dL      Total Protein 5.7 g/dL      Albumin 3.30 g/dL      ALT (SGPT) 28 U/L      AST (SGOT) 75 U/L      Comment: Slight hemolysis detected by analyzer. Results may be affected.        Alkaline Phosphatase 51 U/L      Total Bilirubin 0.3 mg/dL      eGFR Non African Amer 52 mL/min/1.73      Globulin 2.4 gm/dL      A/G Ratio 1.4 g/dL      BUN/Creatinine Ratio 21.6     Anion Gap 9.0 mmol/L     Narrative:      GFR Normal >60  Chronic Kidney Disease <60  Kidney Failure <15      Ferritin [[id number]]  (Abnormal) Collected: 11/02/21 0615    Specimen: Blood Updated: 11/02/21 0724     Ferritin 306.00 ng/mL     Narrative:      Results may be falsely decreased if patient taking Biotin.      Iron Profile [[id number]]  (Abnormal) Collected: 11/02/21 0615    Specimen: Blood Updated: 11/02/21 0724     Iron 80 mcg/dL      Iron Saturation 32 %      Transferrin 168 mg/dL      TIBC 250 mcg/dL     CBC & Differential [[id number]]  (Abnormal) Collected: 11/02/21 0615    Specimen: Blood  Updated: 11/02/21 0659    Narrative:      The following orders were created for panel order CBC & Differential.  Procedure                               Abnormality         Status                     ---------                               -----------         ------                     CBC Auto Differential[[id number]]        Abnormal            Final result                 Please view results for these tests on the individual orders.    CBC Auto Differential [[id number]]  (Abnormal) Collected: 11/02/21 0615    Specimen: Blood Updated: 11/02/21 0659     WBC 6.57 10*3/mm3      RBC 2.96 10*6/mm3      Hemoglobin 9.8 g/dL      Hematocrit 30.6 %      .4 fL      MCH 33.1 pg      MCHC 32.0 g/dL      RDW 16.3 %      RDW-SD 61.8 fl      MPV 9.9 fL      Platelets 181 10*3/mm3      Neutrophil % 65.6 %      Lymphocyte % 23.7 %      Monocyte % 7.3 %      Eosinophil % 2.3 %      Basophil % 0.8 %      Immature Grans % 0.3 %      Neutrophils, Absolute 4.31 10*3/mm3      Lymphocytes, Absolute 1.56 10*3/mm3      Monocytes, Absolute 0.48 10*3/mm3      Eosinophils, Absolute 0.15 10*3/mm3      Basophils, Absolute 0.05 10*3/mm3      Immature Grans, Absolute 0.02 10*3/mm3      nRBC 0.3 /100 WBC     Blood Culture - Blood, Arm, Right [[id number]]  (Normal) Collected: 10/29/21 1831    Specimen: Blood from Arm, Right Updated: 11/01/21 1845     Blood Culture No growth at 3 days    Blood Culture - Blood, Arm, Left [[id number]]  (Normal) Collected: 10/29/21 1800    Specimen: Blood from Arm, Left Updated: 11/01/21 1815     Blood Culture No growth at 3 days    T4, Free [[id number]]  (Normal) Collected: 11/01/21 0708    Specimen: Blood Updated: 11/01/21 1722     Free T4 1.43 ng/dL     Narrative:      Results may be falsely increased if patient taking Biotin.      TSH [[id number]]  (Abnormal) Collected: 11/01/21 0708    Specimen: Blood Updated: 11/01/21 1722     TSH 4.210 uIU/mL            Imaging:  Imaging Results (Last 24 Hours)     ** No results  found for the last 24 hours. **         PPE worn at all times washed before washed afterwards disposed of everything properly was now within 6 feet of her for more than few minutes during my exam no aerosols used at any point    Assessment and Plan:       Generalized weakness    Essential hypertension    S/P AVR    Cognitive change    Paroxysmal atrial fibrillation (HCC)    Dementia (HCC)    JOSÉ LUIS (acute kidney injury) (HCC)    Stage 3 chronic kidney disease (HCC)    Lung nodule    CAD (coronary artery disease)    Leukocytosis    Anemia, chronic disease    Abnormal LFTs    I believe this patient has some superimposed metabolic encephalopathy in the hospital but otherwise is moderately alert and carries on some conversation with me today in a whisper voice.  I would note that she is parkinsonian with bradykinesia rigidity and I am certain loss of postural response, hence the fall at the nursing facility,, but I do not think she has primary Parkinson's disease.  She has enough dementia she does not recall anything about any of that.  The MRI by my independent eyeball review shows atrophy and white matter changes but no acute stroke.  She is not really on any sedating or confusing type medications but temporarily I will hold the nightly Zyrtec that she has been getting.   I will order a plain CAT scan of her head given the change in mental status.    The nurse notes that she was doing better and now seems to be talking in a whisper voice which is pretty typical of psychosis and paranoia and would be consistent with this metabolic encephalopathy.  For this reason I am going to let her have an ultra tiny dose of Zyprexa 2.5 mg p.o. q. suppertime and we will see what she looks like tomorrow.  I believe this low dose of Zyprexa is either going to help her or do nothing, but I do not think it will have any untoward side effects and the benefits outweigh any risks    Prem Gonzalez MD  11/02/21  14:39 EDT

## 2021-11-02 NOTE — PLAN OF CARE
Goal Outcome Evaluation:  Plan of Care Reviewed With: patient        Progress: declining  Outcome Summary: Patient is agreeable to PT/OT and continues to present with overall decline in functional mobility. Pt required modAx2 for supine<>sitting EOB and CGA-maxA for static sitting. Pt continues with R lateral and posterior lean in sitting with cues for midline posture. She completed STS to rwx with modAx2 and maxAx2 to maintain static standing. Attempted to test LE coordination and pt unable to complete heel to shin test and difficulty assessing formal LE MMT. Pt will continue to benefit from skilled PT to increase level of independence. RN notified of continued functional decline.    Patient was not wearing a face mask during this therapy encounter. Therapist used appropriate personal protective equipment including eye protection, mask, and gloves.  Mask used was standard procedure mask. Appropriate PPE was worn during the entire therapy session. Hand hygiene was completed before and after therapy session. Patient is not in enhanced droplet precautions.

## 2021-11-02 NOTE — DISCHARGE PLACEMENT REQUEST
"Dory Hampton (81 y.o. Female)             Date of Birth Social Security Number Address Home Phone MRN    1940  [place] [phone] [id number]    Sabianism Marital Status             Restoration        Admission Date Admission Type Admitting Provider Attending Provider Department, Room/Bed    10/29/21 Emergency Joshua Velasquez MD Jackson, Alan David, MD [address], [address]    Discharge Date Discharge Disposition Discharge Destination                         Attending Provider: Renan Watson MD    Allergies: Eggs Or Egg-derived Products, Formaldehyde, Other, Penicillins, Sulfa Antibiotics    Isolation: None   Infection: None   Code Status: CPR   Advance Care Planning Activity    Ht: 162.6 cm (64\")   Wt: 50.1 kg (110 lb 7.2 oz)    Admission Cmt: None   Principal Problem: Generalized weakness [R53.1]                 Active Insurance as of 10/29/2021     Primary Coverage     Payor Plan Insurance Group Employer/Plan Group    [facility] MEDICARE REPLACEMENT [facility] MEDICARE REPLACEMENT [id number]     Payor Plan Address Payor Plan Phone Number Payor Plan Fax Number Effective Dates    [address] [address]   1/1/2016 - None Entered    [facility] [id number]       Subscriber Name Subscriber Birth Date Member ID       DORY HAMPTON 1940 [id number]                 Emergency Contacts      (Rel.) Home Phone Work Phone Mobile Phone    MemoToñito ray (Son) [phone] -- [phone]    Sara Wolf (Daughter) [phone] -- [phone]    Kody Hampton (Spouse) [phone] -- [phone]    Elise Chino (Daughter) [phone] -- [phone]              "

## 2021-11-02 NOTE — THERAPY TREATMENT NOTE
Patient Name: Dory Hampton  : 1940    MRN: [id number]                              Today's Date: 2021       Admit Date: 10/29/2021    Visit Dx:     ICD-10-CM ICD-9-CM   1. Weakness  R53.1 780.79   2. Acute UTI  N39.0 599.0   3. Acute kidney injury (HCC)  N17.9 584.9   4. Anemia, unspecified type  D64.9 285.9   5. Dementia with behavioral disturbance, unspecified dementia type (HCC)  F03.91 294.21     Patient Active Problem List   Diagnosis   • Essential hypertension   • Hyperlipidemia   • Hypothyroidism, adult   • Iron deficiency anemia due to chronic blood loss   • Macrocytic anemia   • AVM (arteriovenous malformation) of small bowel, acquired with hemorrhage   • S/P AVR   • Cognitive change   • Paroxysmal atrial fibrillation (HCC)   • Sinus bradycardia   • Bilateral carotid artery stenosis   • Generalized weakness   • Dementia (HCC)   • JOSÉ LUIS (acute kidney injury) ([facility])   • Stage 3 chronic kidney disease (HCC)   • Lung nodule   • CAD (coronary artery disease)   • Leukocytosis   • Anemia, chronic disease   • Abnormal LFTs     Past Medical History:   Diagnosis Date   • Abnormal electrocardiogram    • Anemia    • Aortic stenosis     severe; s/p repair   • Asthma     HISTORY   • Bilateral carotid artery stenosis 2020   • Bradycardia    • Coronary artery disease    • Diastolic dysfunction     grade II   • Environmental allergies    • Female stress incontinence    • Hemolytic anemia, mechanical ([facility]) 2016   • History of diverticulosis    • History of transfusion    • Hyperlipidemia    • Hypertension    • Hypokalemia    • Hypothyroid    • Iron deficiency anemia     hemolytic anemia   • Iron deficiency anemia due to chronic blood loss 2016   • Left ventricular hypertrophy    • Murmur    • PAF (paroxysmal atrial fibrillation) ([facility])     with RVR   • Pleural effusion     bilateral, post op   • Psoriasis    • Tendinitis    • Thoracic aorta atherosclerosis ([facility])      Past Surgical History:   Procedure  Laterality Date   • AORTIC VALVE REPAIR/REPLACEMENT N/A 12/16/2016    Procedure: BHARGAVI MINI STERNOTOMY AORTIC VALVE REPLACEMENT;  Surgeon: Robin Jones MD;  Location: [facility] OR;  Service:    • APPENDECTOMY  06/1972   • CAPSULE ENDOSCOPY  2015   • CARDIAC CATHETERIZATION N/A 8/25/2016    Procedure: Coronary angiography;  Surgeon: Lulú Hooper MD;  Location: [facility] CATH INVASIVE LOCATION;  Service:    • CARDIAC CATHETERIZATION N/A 8/25/2016    Procedure: Right Heart Cath;  Surgeon: Lulú Hooper MD;  Location: [facility] CATH INVASIVE LOCATION;  Service:    • CATARACT EXTRACTION Bilateral     Left 12/10/13, Right 12/17/2013   • CATARACT EXTRACTION     • COLONOSCOPY  06/22/2015    Dr. Mohamud   • DENTAL PROCEDURE  2011    Implants   • ENDOSCOPY  06/22/2015    Dr. Mohamud   • ENTEROSCOPY SMALL BOWEL N/A 11/21/2016    Procedure: ENTEROSCOPY SMALL BOWEL WITH APC CAUTERY;  Surgeon: Tj Torres MD;  Location:  [facility];  Service:    • PELVIC LAPAROSCOPY     • STERNOTOMY      mini   • TONSILLECTOMY     • TUBAL ABDOMINAL LIGATION        General Information     Row Name 11/02/21 1157          Physical Therapy Time and Intention    Document Type therapy note (daily note)  -CB     Mode of Treatment co-treatment; physical therapy; occupational therapy  -CB     Row Name 11/02/21 1157          General Information    Patient Profile Reviewed yes  -CB     Existing Precautions/Restrictions fall  -CB     Row Name 11/02/21 1157          Cognition    Orientation Status (Cognition) oriented to; person  -CB     Row Name 11/02/21 1157          Safety Issues, Functional Mobility    Safety Issues Affecting Function (Mobility) awareness of need for assistance; insight into deficits/self-awareness; judgment; positioning of assistive device; safety precaution awareness; safety precautions follow-through/compliance; sequencing abilities  -CB     Impairments Affecting Function (Mobility) balance; cognition; endurance/activity  tolerance; strength; postural/trunk control  -CB     Comment, Safety Issues/Impairments (Mobility) gait belt and non skid socks  -CB           User Key  (r) = Recorded By, (t) = Taken By, (c) = Cosigned By    Initials Name Provider Type    CB Jossie Guerra PT Physical Therapist               Mobility     Row Name 11/02/21 1158          Bed Mobility    Bed Mobility supine-sit; sit-supine; scooting/bridging  -CB     Scooting/Bridging Chaves (Bed Mobility) maximum assist (25% patient effort); 2 person assist; verbal cues  -CB     Supine-Sit Chaves (Bed Mobility) moderate assist (50% patient effort); 2 person assist; verbal cues  -CB     Sit-Supine Chaves (Bed Mobility) moderate assist (50% patient effort); 2 person assist; verbal cues  -CB     Assistive Device (Bed Mobility) bed rails; head of bed elevated; leg ; draw sheet  -CB     Row Name 11/02/21 1158          Transfers    Comment (Transfers) pt continues to demonstate R lateral and posterior lean in sitting and standing  -CB     Row Name 11/02/21 1158          Bed-Chair Transfer    Bed-Chair Chaves (Transfers) unable to assess  -CB     Row Name 11/02/21 1158          Sit-Stand Transfer    Sit-Stand Chaves (Transfers) moderate assist (50% patient effort); 2 person assist; verbal cues  -CB     Assistive Device (Sit-Stand Transfers) walker, front-wheeled  -CB     Row Name 11/02/21 1158          Gait/Stairs (Locomotion)    Chaves Level (Gait) unable to assess  -CB           User Key  (r) = Recorded By, (t) = Taken By, (c) = Cosigned By    Initials Name Provider Type    Jossie Wheeler PT Physical Therapist               Obj/Interventions     Row Name 11/02/21 1158          Strength Comprehensive (MMT)    Comment, General Manual Muscle Testing (MMT) Assessment increased difficulty to complete formal MMT; weakness BLE  -CB     Row Name 11/02/21 1158          Motor Skills    Motor Skills coordination  -CB     Coordination --   Pt unable to perform heel to shin test bilaterally  -CB     Row Name 11/02/21 1158          Balance    Balance Assessment sitting static balance; sitting dynamic balance; standing static balance  -CB     Static Sitting Balance supported; sitting, edge of bed  CGA-maxA for sitting EOB  -CB     Dynamic Sitting Balance severe impairment; supported; sitting, edge of bed  -CB     Static Standing Balance severe impairment; supported; standing  -CB     Balance Interventions sitting; standing; supported; sit to stand; static; dynamic; minimal challenge; trunk training exercise; weight shifting activity  -CB     Comment, Balance R lateral and posterior lean in sitting and standing; max cues for midline posture and weightshifting  -CB           User Key  (r) = Recorded By, (t) = Taken By, (c) = Cosigned By    Initials Name Provider Type    Jossie Wheeler PT Physical Therapist               Goals/Plan    No documentation.                Clinical Impression     Row Name 11/02/21 1200          Pain    Additional Documentation Pain Scale: Numbers Pre/Post-Treatment (Group)  -CB     Row Name 11/02/21 1200          Pain Scale: Numbers Pre/Post-Treatment    Pretreatment Pain Rating 0/10 - no pain  -CB     Posttreatment Pain Rating 0/10 - no pain  -CB     Row Name 11/02/21 1200          Plan of Care Review    Plan of Care Reviewed With patient  -CB     Progress declining  -CB     Outcome Summary Patient is agreeable to PT/OT and continues to present with overall decline in functional mobility. Pt required modAx2 for supine<>sitting EOB and CGA-maxA for static sitting. Pt continues with R lateral and posterior lean in sitting with cues for midline posture. She completed STS to rwx with modAx2 and maxAx2 to maintain static standing. Attempted to test LE coordination and pt unable to complete heel to shin test and difficulty assessing formal LE MMT. Pt will continue to benefit from skilled PT to increase level of independence. RN  notified of continued functional decline.  -CB     Row Name 11/02/21 1200          Positioning and Restraints    Pre-Treatment Position in bed  -CB     Post Treatment Position bed  -CB     In Bed notified nsg; fowlers; call light within reach; encouraged to call for assist; exit alarm on; side rails up x3  -CB           User Key  (r) = Recorded By, (t) = Taken By, (c) = Cosigned By    Initials Name Provider Type    Jossie Wheeler, PT Physical Therapist               Outcome Measures     Row Name 11/02/21 1204          How much help from another person do you currently need...    Turning from your back to your side while in flat bed without using bedrails? 3  -CB     Moving from lying on back to sitting on the side of a flat bed without bedrails? 2  -CB     Moving to and from a bed to a chair (including a wheelchair)? 1  -CB     Standing up from a chair using your arms (e.g., wheelchair, bedside chair)? 1  -CB     Climbing 3-5 steps with a railing? 1  -CB     To walk in hospital room? 1  -CB     AM-PAC 6 Clicks Score (PT) 9  -CB     Row Name 11/02/21 1204          Functional Assessment    Outcome Measure Options AM-PAC 6 Clicks Basic Mobility (PT)  -CB           User Key  (r) = Recorded By, (t) = Taken By, (c) = Cosigned By    Initials Name Provider Type    Jossie Wheeler, BRUCE Physical Therapist                             Physical Therapy Education                 Title: PT OT SLP Therapies (Done)     Topic: Physical Therapy (Done)     Point: Mobility training (Done)     Learning Progress Summary           Patient Acceptance, E,TB,D, VU,NR by  at 11/2/2021 1205    Acceptance, E,TB,D, VU,DU,NR by  at 11/1/2021 2044    Acceptance, E,TB,D, NR,NL by  at 11/1/2021 1211                   Point: Home exercise program (Done)     Learning Progress Summary           Patient Acceptance, E,TB,D, VU,DU,NR by  at 11/1/2021 2044                   Point: Body mechanics (Done)     Learning Progress Summary            Patient Acceptance, E,TB,D, VU,NR by CB at 11/2/2021 1205    Acceptance, E,TB,D, VU,DU,NR by  at 11/1/2021 2044                   Point: Precautions (Done)     Learning Progress Summary           Patient Acceptance, E,TB,D, VU,NR by CB at 11/2/2021 1205    Acceptance, E,TB,D, VU,DU,NR by  at 11/1/2021 2044    Acceptance, E,TB,D, NR,NL by  at 11/1/2021 1211                               User Key     Initials Effective Dates Name Provider Type Discipline     06/16/21 -  Christelle Leija, RN Registered Nurse Nurse     10/22/21 -  Jossie Guerra, PT Physical Therapist PT              PT Recommendation and Plan  Planned Therapy Interventions (PT): balance training, bed mobility training, gait training, home exercise program, patient/family education, postural re-education, transfer training, strengthening  Plan of Care Reviewed With: patient  Progress: declining  Outcome Summary: Patient is agreeable to PT/OT and continues to present with overall decline in functional mobility. Pt required modAx2 for supine<>sitting EOB and CGA-maxA for static sitting. Pt continues with R lateral and posterior lean in sitting with cues for midline posture. She completed STS to rwx with modAx2 and maxAx2 to maintain static standing. Attempted to test LE coordination and pt unable to complete heel to shin test and difficulty assessing formal LE MMT. Pt will continue to benefit from skilled PT to increase level of independence. RN notified of continued functional decline.     Time Calculation:    PT Charges     Row Name 11/02/21 1205             Time Calculation    Start Time 1037  -CB      Stop Time 1055  -CB      Time Calculation (min) 18 min  -CB      PT Received On 11/02/21  -CB      PT - Next Appointment 11/03/21  -CB              Time Calculation- PT    Total Timed Code Minutes- PT 18 minute(s)  -CB              Timed Charges    55758 - PT Therapeutic Activity Minutes 18  -CB              Total Minutes    Timed Charges Total  Minutes 18  -CB       Total Minutes 18  -CB            User Key  (r) = Recorded By, (t) = Taken By, (c) = Cosigned By    Initials Name Provider Type    CB Jossie Guerra, PT Physical Therapist              Therapy Charges for Today     Code Description Service Date Service Provider Modifiers Qty    88229807436  PT THERAPEUTIC ACT EA 15 MIN 11/1/2021 Jossie Guerra, PT GP 1    [id number]  PT THERAPEUTIC ACT EA 15 MIN 11/2/2021 Jossie Guerra, PT GP 1          PT G-Codes  Outcome Measure Options: AM-PAC 6 Clicks Basic Mobility (PT)  AM-PAC 6 Clicks Score (PT): 9  AM-PAC 6 Clicks Score (OT): 16  Modified [place] Scale: 4 - Moderately severe disability.  Unable to walk without assistance, and unable to attend to own bodily needs without assistance.    Jossie Guerra, BRUCE  11/2/2021

## 2021-11-03 ENCOUNTER — APPOINTMENT (OUTPATIENT)
Dept: CT IMAGING | Facility: HOSPITAL | Age: 81
End: 2021-11-03

## 2021-11-03 LAB
ALBUMIN SERPL-MCNC: 3.4 G/DL (ref 3.5–5.2)
ALBUMIN/GLOB SERPL: 1.4 G/DL
ALP SERPL-CCNC: 50 U/L (ref 39–117)
ALT SERPL W P-5'-P-CCNC: 22 U/L (ref 1–33)
ANION GAP SERPL CALCULATED.3IONS-SCNC: 10.1 MMOL/L (ref 5–15)
AST SERPL-CCNC: 44 U/L (ref 1–32)
BACTERIA SPEC AEROBE CULT: NORMAL
BACTERIA SPEC AEROBE CULT: NORMAL
BASOPHILS # BLD AUTO: 0.07 10*3/MM3 (ref 0–0.2)
BASOPHILS NFR BLD AUTO: 1.1 % (ref 0–1.5)
BILIRUB SERPL-MCNC: 0.4 MG/DL (ref 0–1.2)
BUN SERPL-MCNC: 25 MG/DL (ref 8–23)
BUN/CREAT SERPL: 25.8 (ref 7–25)
CALCIUM SPEC-SCNC: 8.8 MG/DL (ref 8.6–10.5)
CHLORIDE SERPL-SCNC: 109 MMOL/L (ref 98–107)
CO2 SERPL-SCNC: 23.9 MMOL/L (ref 22–29)
CREAT SERPL-MCNC: 0.97 MG/DL (ref 0.57–1)
DEPRECATED RDW RBC AUTO: 59.1 FL (ref 37–54)
EOSINOPHIL # BLD AUTO: 0.16 10*3/MM3 (ref 0–0.4)
EOSINOPHIL NFR BLD AUTO: 2.5 % (ref 0.3–6.2)
ERYTHROCYTE [DISTWIDTH] IN BLOOD BY AUTOMATED COUNT: 15.8 % (ref 12.3–15.4)
GFR SERPL CREATININE-BSD FRML MDRD: 55 ML/MIN/1.73
GLOBULIN UR ELPH-MCNC: 2.4 GM/DL
GLUCOSE SERPL-MCNC: 81 MG/DL (ref 65–99)
HCT VFR BLD AUTO: 29.5 % (ref 34–46.6)
HGB BLD-MCNC: 10 G/DL (ref 12–15.9)
IMM GRANULOCYTES # BLD AUTO: 0.03 10*3/MM3 (ref 0–0.05)
IMM GRANULOCYTES NFR BLD AUTO: 0.5 % (ref 0–0.5)
LYMPHOCYTES # BLD AUTO: 1.63 10*3/MM3 (ref 0.7–3.1)
LYMPHOCYTES NFR BLD AUTO: 25.5 % (ref 19.6–45.3)
MCH RBC QN AUTO: 34.5 PG (ref 26.6–33)
MCHC RBC AUTO-ENTMCNC: 33.9 G/DL (ref 31.5–35.7)
MCV RBC AUTO: 101.7 FL (ref 79–97)
MONOCYTES # BLD AUTO: 0.37 10*3/MM3 (ref 0.1–0.9)
MONOCYTES NFR BLD AUTO: 5.8 % (ref 5–12)
NEUTROPHILS NFR BLD AUTO: 4.14 10*3/MM3 (ref 1.7–7)
NEUTROPHILS NFR BLD AUTO: 64.6 % (ref 42.7–76)
NRBC BLD AUTO-RTO: 0 /100 WBC (ref 0–0.2)
PLATELET # BLD AUTO: 212 10*3/MM3 (ref 140–450)
PMV BLD AUTO: 9.6 FL (ref 6–12)
POTASSIUM SERPL-SCNC: 3.7 MMOL/L (ref 3.5–5.2)
PROT SERPL-MCNC: 5.8 G/DL (ref 6–8.5)
RBC # BLD AUTO: 2.9 10*6/MM3 (ref 3.77–5.28)
SODIUM SERPL-SCNC: 143 MMOL/L (ref 136–145)
WBC # BLD AUTO: 6.4 10*3/MM3 (ref 3.4–10.8)

## 2021-11-03 PROCEDURE — 25010000002 ENOXAPARIN PER 10 MG: Performed by: NURSE PRACTITIONER

## 2021-11-03 PROCEDURE — 70450 CT HEAD/BRAIN W/O DYE: CPT

## 2021-11-03 PROCEDURE — 99231 SBSQ HOSP IP/OBS SF/LOW 25: CPT | Performed by: PSYCHIATRY & NEUROLOGY

## 2021-11-03 PROCEDURE — 80053 COMPREHEN METABOLIC PANEL: CPT | Performed by: INTERNAL MEDICINE

## 2021-11-03 PROCEDURE — 85025 COMPLETE CBC W/AUTO DIFF WBC: CPT | Performed by: INTERNAL MEDICINE

## 2021-11-03 PROCEDURE — 97530 THERAPEUTIC ACTIVITIES: CPT

## 2021-11-03 PROCEDURE — 99232 SBSQ HOSP IP/OBS MODERATE 35: CPT | Performed by: INTERNAL MEDICINE

## 2021-11-03 PROCEDURE — 92610 EVALUATE SWALLOWING FUNCTION: CPT

## 2021-11-03 RX ADMIN — OLANZAPINE 2.5 MG: 2.5 TABLET ORAL at 18:20

## 2021-11-03 RX ADMIN — SODIUM CHLORIDE, PRESERVATIVE FREE 10 ML: 5 INJECTION INTRAVENOUS at 20:12

## 2021-11-03 RX ADMIN — LEVOTHYROXINE SODIUM 50 MCG: 0.05 TABLET ORAL at 09:02

## 2021-11-03 RX ADMIN — Medication 100 MG: at 09:02

## 2021-11-03 RX ADMIN — ENOXAPARIN SODIUM 40 MG: 40 INJECTION SUBCUTANEOUS at 16:57

## 2021-11-03 RX ADMIN — SODIUM CHLORIDE, PRESERVATIVE FREE 10 ML: 5 INJECTION INTRAVENOUS at 09:02

## 2021-11-03 RX ADMIN — AZELASTINE HYDROCHLORIDE 1 SPRAY: 137 SPRAY, METERED NASAL at 20:12

## 2021-11-03 RX ADMIN — ASPIRIN 81 MG: 81 TABLET, COATED ORAL at 09:02

## 2021-11-03 RX ADMIN — Medication 1 TABLET: at 09:02

## 2021-11-03 RX ADMIN — DONEPEZIL HYDROCHLORIDE 10 MG: 10 TABLET, FILM COATED ORAL at 20:12

## 2021-11-03 RX ADMIN — AZELASTINE HYDROCHLORIDE 1 SPRAY: 137 SPRAY, METERED NASAL at 09:02

## 2021-11-03 RX ADMIN — Medication 1000 MCG: at 09:02

## 2021-11-03 NOTE — PLAN OF CARE
Goal Outcome Evaluation:  Plan of Care Reviewed With: patient        Progress: improving  Outcome Summary: Neuro reconsulted for decline in LOC, CT of head ordered, IV fluids at 50 ml/hr, q2 turns, VSS, bladder scan 159cc, at 0600 patient seems back to baseline, speaking clear full sentences, tolerating thin liquids, will CTM

## 2021-11-03 NOTE — PLAN OF CARE
Goal Outcome Evaluation:  Plan of Care Reviewed With: patient        Progress: no change  Outcome Summary: Pt sitting in bed at beg of session. Pt req min A to sit up to EOB - less assist req. Pt stood req min A x 2 and use of fww. Pt amb 12' to chair w/ gait unsteady, slow, shuffling w/ 1 LOB as pt turned to chair req min A to correct. Pt req min A x 2 w/ use of fww for gait. Pt performed BLE Ther ex in chair w/ vc / tc to incr ROM. PT will prog as pt rosa.    Patient was intermittently wearing a face mask during this therapy encounter. Therapist used appropriate personal protective equipment including eye protection, mask, and gloves.  Mask used was standard procedure mask. Appropriate PPE was worn during the entire therapy session. Hand hygiene was completed before and after therapy session. Patient is not in enhanced droplet precautions.

## 2021-11-03 NOTE — PROGRESS NOTES
Continued Stay Note  [facility]     Patient Name: Dory Hampton  MRN: [id number]  Today's Date: 11/3/2021    Admit Date: 10/29/2021     Discharge Plan     Row Name 11/03/21 1118       Plan    Plan [facility] PENDING pre-cert    Plan Comments Spoke w/ [place], [facility] will accept pending [facility] cert. Cert initiated this AM. CCP will update family.               Discharge Codes    No documentation.               Expected Discharge Date and Time     Expected Discharge Date Expected Discharge Time    Nov 4, 2021             Noemy Kahn RN

## 2021-11-03 NOTE — PROGRESS NOTES
[facility] INPATIENT PROGRESS NOTE    Length of Stay:  5 days    CHIEF COMPLAINT: Dementia, UTI, right lower lobe pulmonary nodule, anemia, elevated LFTs, JOSÉ LUIS/CKD3      SUBJECTIVE: Patient is awake, alert, conversant.  Minimal confusion, appears oriented to person and place.  Patient has no new complaints.      ROS:  Review of Systems a comprehensive review of systems was obtained with pertinent positive findings as noted in the interval history above.  All other systems negative.    OBJECTIVE:  Vitals:    11/02/21 1926 11/02/21 2311 11/03/21 0723 11/03/21 1307   BP: 132/52 119/56 139/52 131/76   BP Location: Right arm Right arm Right arm Right arm   Patient Position: Sitting Lying Lying Sitting   Pulse: 66 55 53 69   Resp: 18 18 16 16   Temp: 98.1 °F (36.7 °C) 97.4 °F (36.3 °C)  97.6 °F (36.4 °C)   TempSrc: Axillary Oral  Oral   SpO2: 95% 95% 96% 96%   Weight:       Height:             PHYSICAL EXAMINATION:  General: Elderly female lying in bed no distress, awake and alert, no distress  Chest/Lungs: Clear to auscultation bilaterally  Heart: Regular rate and rhythm with 2/6 murmur  Abdomen/GI: Soft nontender nondistended bowel sounds present  Extremities: No edema    DIAGNOSTIC DATA:  Results Review:     I reviewed the patient's new clinical results.    Results from last 7 days   Lab Units 11/03/21  0714 11/02/21 0615 11/01/21  0708   WBC 10*3/mm3 6.40 6.57 7.15   HEMOGLOBIN g/dL 10.0* 9.8* 10.6*   HEMATOCRIT % 29.5* 30.6* 31.6*   PLATELETS 10*3/mm3 212 181 204      Results from last 7 days   Lab Units 11/03/21  0714 11/02/21  0615 11/01/21  0708   SODIUM mmol/L 143 142 145   POTASSIUM mmol/L 3.7 4.0 4.0   CHLORIDE mmol/L 109* 109* 108*   CO2 mmol/L 23.9 24.0 26.1   BUN mg/dL 25* 22 15   CREATININE mg/dL 0.97 1.02* 1.07*   CALCIUM mg/dL 8.8 8.8 9.1   BILIRUBIN mg/dL 0.4 0.3 0.4   ALK PHOS U/L 50 51 59   ALT (SGPT) U/L 22 28 36*   AST (SGOT) U/L 44* 75* 107*   GLUCOSE mg/dL 81 91 105*      Lab Results    Component Value Date    NEUTROABS 4.14 11/03/2021     Results from last 7 days   Lab Units 10/29/21  1414   INR  0.99     Results from last 7 days   Lab Units 10/29/21  1414   MAGNESIUM mg/dL 2.3         Assessment/Plan   ASSESSMENT/PLAN:  This is a 81 y.o. female with:     *Dementia with metabolic encephalopathy  · CT head 10/29/2021 and MRI brain 10/30/2021 with small vessel disease, diffuse cerebral atrophy  · Patient with history of chronic dementia, resident of [facility]  · Patient with increasing confusion during this hospitalization with metabolic encephalopathy possibly attributed to UTI  · Repeat head CT 11/3/2021 showed no significant findings  · Patient today has improved dramatically.  She is awake, alert, oriented at least to person and place.  She has no current complaints.     *Possible UTI  · Patient completed a course of Rocephin     *Right lower lobe pulmonary nodule  · Patient with history of smoking 1 pack/day, quit in 2010  · Current CT chest 10/31/2021 with solitary 1 x 1.5 cm nodule that appears to be pleural-based in the right posterior lower lobe sulcus.  There are additional calcified granuloma in the right base anteriorly and calcified infracarinal lymph nodes.  There is no mediastinal or hilar adenopathy.    · CT findings are suspicious for potential early stage lung cancer  · Given the patient's underlying chronic dementia and current metabolic encephalopathy, she is currently lethargic and noncommunicative.  I discussed the situation with the patient's son previously.  It was initially decided not to pursue further work-up or evaluation for this issue in light of her dementia and worsening mental status.  · Patient today with dramatic improvement in her mental status, is awake, alert, conversant.  Her daughter is at the bedside.  We had a discussion regarding her CT findings with the right lower lobe nodule.  We discussed options for management including no further  testing or treatment, consideration of CT-guided biopsy, or first obtaining a PET scan, versus follow-up CT at a 2 to 3-month interval.  The patient and her family will discuss the issue and we will review their decision tomorrow.     *Anemia  · Patient with history of iron deficiency and hemolytic anemia related to severe aortic stenosis.    · Patient did require IV iron in 2016  · Hemoglobin did improve and has remained in the 11-12 range, likely to some extent related to CKD3  · Hemoglobin has declined during hospitalization in the 10-11 range with chronically elevated MCV  · Additional evaluation on 11/2/2021 with iron 80, ferritin 306, iron saturation 32%, TIBC 250, folate greater than 20, B12 1771, haptoglobin 54, .  · No evidence to suggest ongoing hemolysis.  Labs consistent with anemia secondary to chronic disease.  · Hemoglobin today 10.0.     *Elevated LFTs  · Patient with new elevation in transaminases during current admission  · CT abdomen and pelvis with normal-appearing liver   · Labs today with improvement, ALT 22, AST 44, normal total bilirubin 0.4.  Etiology of elevated LFTs is unclear.     *JOSÉ LUIS/CKD3  · Baseline creatinine in the 1-1.4 range  · Creatinine did increase to 1.72 on admission 10/29/2021 however returned to baseline        PLAN:   1. The patient and her family will consider options for management of the right lower lobe pulmonary nodule.  I will meet with them tomorrow to discuss their decision.  2. Daily CBC, CMP    Discussed with patient and daughter at bedside today             Prem Keys MD

## 2021-11-03 NOTE — PROGRESS NOTES
"  Patient Identification:  NAME:  Dory Hampton  Age:  81 y.o.   Sex:  female   :  1940   MRN:  [id number]       Chief complaint: Vascular parkinsonism, metabolic encephalopathy brief psychotic episode    History of present illness: She is dramatically better today even the daughter notes \"she is like a new person\".  The patient denies any hallucinations tolerated the Zyprexa last night well.  Recall the MRI of the brain showed no acute abnormality      Past medical history:  Past Medical History:   Diagnosis Date   • Abnormal electrocardiogram    • Anemia    • Aortic stenosis     severe; s/p repair   • Asthma     HISTORY   • Bilateral carotid artery stenosis 2020   • Bradycardia    • Coronary artery disease    • Diastolic dysfunction     grade II   • Environmental allergies    • Female stress incontinence    • Hemolytic anemia, mechanical (HCC) 2016   • History of diverticulosis    • History of transfusion    • Hyperlipidemia    • Hypertension    • Hypokalemia    • Hypothyroid    • Iron deficiency anemia     hemolytic anemia   • Iron deficiency anemia due to chronic blood loss 2016   • Left ventricular hypertrophy    • Murmur    • PAF (paroxysmal atrial fibrillation) ([facility])     with RVR   • Pleural effusion     bilateral, post op   • Psoriasis    • Tendinitis    • Thoracic aorta atherosclerosis ([facility])        Allergies:  Eggs or egg-derived products, Formaldehyde, Other, Penicillins, and Sulfa antibiotics    Home medications:  Medications Prior to Admission   Medication Sig Dispense Refill Last Dose   • aspirin 81 MG EC tablet Take 1 tablet by mouth Daily.   10/28/2021 at Unknown time   • azelastine (ASTELIN) 0.1 % nasal spray INSTILL 2 SPRAYS INTO EACH NOSTRIL TWICE DAILY 30 mL 1 10/28/2021 at Unknown time   • Cyanocobalamin (VITAMIN B-12 PO) Take 1,000 mcg by mouth daily.   10/28/2021 at Unknown time   • donepezil (ARICEPT) 10 MG tablet TAKE 1 TABLET BY MOUTH EVERY NIGHT 90 tablet 0 10/28/2021 " at Unknown time   • loratadine (Claritin) 10 MG tablet Take 10 mg by mouth Daily.   10/28/2021 at Unknown time   • losartan-hydrochlorothiazide (HYZAAR) 100-25 MG per tablet TAKE 1 TABLET DAILY 90 tablet 3 10/28/2021 at Unknown time   • Multiple Vitamins-Minerals (CENTRUM SILVER PO) Take 1 tablet by mouth Daily. PT HOLDING FOR SURGERY   10/28/2021 at Unknown time   • Pyridoxine HCl (VITAMIN B6 PO) Take 100 mg by mouth daily.   10/28/2021 at Unknown time   • Synthroid 50 MCG tablet TAKE 1 TABLET BY MOUTH DAILY 90 tablet 1 10/28/2021 at Unknown time        Hospital medications:  aspirin, 81 mg, Oral, Daily  azelastine, 1 spray, Each Nare, BID  donepezil, 10 mg, Oral, Nightly  enoxaparin, 40 mg, Subcutaneous, Q24H  levothyroxine, 50 mcg, Oral, Daily  multivitamin with minerals, 1 tablet, Oral, Daily  OLANZapine, 2.5 mg, Oral, Daily Before Supper  sodium chloride, 10 mL, Intravenous, Q12H  vitamin B-12, 1,000 mcg, Oral, Daily  vitamin B-6, 100 mg, Oral, Daily         •  acetaminophen **OR** acetaminophen **OR** acetaminophen  •  hydrALAZINE  •  nitroglycerin  •  ondansetron  •  sodium chloride  •  [COMPLETED] Insert peripheral IV **AND** sodium chloride  •  sodium chloride      Objective:  Vitals Ranges:   Temp:  [97.4 °F (36.3 °C)-98.1 °F (36.7 °C)] 97.6 °F (36.4 °C)  Heart Rate:  [53-69] 69  Resp:  [16-18] 16  BP: (119-139)/(52-76) 131/76      Physical Exam:  Patient is awake and alert today much better than yesterday normal cranial nerves II through VII tongue is midline moves all extremities well but has rigidity and bradykinesia reflexes trace toes downgoing  Results review:   I reviewed the patient's new clinical results.    Data review:  Lab Results (last 24 hours)     Procedure Component Value Units Date/Time    Comprehensive Metabolic Panel [[id number]]  (Abnormal) Collected: 11/03/21 0714    Specimen: Blood Updated: 11/03/21 0806     Glucose 81 mg/dL      BUN 25 mg/dL      Creatinine 0.97 mg/dL      Sodium 143  mmol/L      Potassium 3.7 mmol/L      Chloride 109 mmol/L      CO2 23.9 mmol/L      Calcium 8.8 mg/dL      Total Protein 5.8 g/dL      Albumin 3.40 g/dL      ALT (SGPT) 22 U/L      AST (SGOT) 44 U/L      Alkaline Phosphatase 50 U/L      Total Bilirubin 0.4 mg/dL      eGFR Non African Amer 55 mL/min/1.73      Globulin 2.4 gm/dL      A/G Ratio 1.4 g/dL      BUN/Creatinine Ratio 25.8     Anion Gap 10.1 mmol/L     Narrative:      GFR Normal >60  Chronic Kidney Disease <60  Kidney Failure <15      CBC & Differential [[id number]]  (Abnormal) Collected: 11/03/21 0714    Specimen: Blood Updated: 11/03/21 0738    Narrative:      The following orders were created for panel order CBC & Differential.  Procedure                               Abnormality         Status                     ---------                               -----------         ------                     CBC Auto Differential[[id number]]        Abnormal            Final result                 Please view results for these tests on the individual orders.    CBC Auto Differential [[id number]]  (Abnormal) Collected: 11/03/21 0714    Specimen: Blood Updated: 11/03/21 0738     WBC 6.40 10*3/mm3      RBC 2.90 10*6/mm3      Hemoglobin 10.0 g/dL      Hematocrit 29.5 %      .7 fL      MCH 34.5 pg      MCHC 33.9 g/dL      RDW 15.8 %      RDW-SD 59.1 fl      MPV 9.6 fL      Platelets 212 10*3/mm3      Neutrophil % 64.6 %      Lymphocyte % 25.5 %      Monocyte % 5.8 %      Eosinophil % 2.5 %      Basophil % 1.1 %      Immature Grans % 0.5 %      Neutrophils, Absolute 4.14 10*3/mm3      Lymphocytes, Absolute 1.63 10*3/mm3      Monocytes, Absolute 0.37 10*3/mm3      Eosinophils, Absolute 0.16 10*3/mm3      Basophils, Absolute 0.07 10*3/mm3      Immature Grans, Absolute 0.03 10*3/mm3      nRBC 0.0 /100 WBC     Blood Culture - Blood, Arm, Right [[id number]]  (Normal) Collected: 10/29/21 1831    Specimen: Blood from Arm, Right Updated: 11/02/21 1845     Blood Culture No  growth at 4 days    Blood Culture - Blood, Arm, Left [[id number]]  (Normal) Collected: 10/29/21 1800    Specimen: Blood from Arm, Left Updated: 11/02/21 1815     Blood Culture No growth at 4 days           Imaging:  Imaging Results (Last 24 Hours)     Procedure Component Value Units Date/Time    CT Head Without Contrast [[id number]] Collected: 11/03/21 0255     Updated: 11/03/21 0302    Narrative:      CT HEAD WITHOUT CONTRAST     HISTORY: Status change     COMPARISON: 10/30/2021     TECHNIQUE: Axial CT imaging was obtained through the brain. No IV  contrast was administered.     FINDINGS:  No acute intracranial hemorrhage is seen. The patient has atrophy.  Degree of ventricular dilatation may be somewhat out of portion to the  degree of atrophy. Appearance can be seen with normal pressure  hydrocephalus, and correlation with clinical presentation is  recommended. There is periventricular and deep white matter  microangiopathic change. There is no midline shift or mass effect. There  are bilateral basal ganglia calcifications. At least partially calcified  lesion within the right maxillary sinus may reflect an osteoma.       Impression:      No acute intracranial findings.     Radiation dose reduction techniques were utilized, including automated  exposure control and exposure modulation based on body size.     This report was finalized on 11/3/2021 2:59 AM by Dr. Lakisha Gandara M.D.            PPE worn at all times washed before washed afterwards disposed of everything properly was now within 6 feet of her for more than few minutes during my exam no aerosols used    Assessment and Plan:       Generalized weakness    Essential hypertension    S/P AVR    Cognitive change    Paroxysmal atrial fibrillation (HCC)    Dementia (HCC)    JOSÉ LUIS (acute kidney injury) (HCC)    Stage 3 chronic kidney disease (HCC)    Lung nodule    CAD (coronary artery disease)    Leukocytosis    Anemia, chronic disease    Abnormal LFTs  She  looks great today metabolic encephalopathy and psychosis appears much improved she received a tiny dose of Zyprexa last night but according to the daughter she is not having any hallucinations or significant confusion at home and for that reason I would not prescribe Zyprexa at the time of discharge  Note she had a fall initially at the nursing facility that brought her to the hospital and this is almost certainly secondary to vascular parkinsonism I do not believe she has primary Parkinson's disease and definitely should not be treated with anti-Parkinson's medicines  Neurology will sign off and follow-up as needed reconsult thanks      Prem Gonzalez MD  11/03/21  15:45 EDT

## 2021-11-03 NOTE — THERAPY TREATMENT NOTE
Patient Name: Dory Hampton  : 1940    MRN: [id number]                              Today's Date: 11/3/2021       Admit Date: 10/29/2021    Visit Dx:     ICD-10-CM ICD-9-CM   1. Weakness  R53.1 780.79   2. Acute UTI  N39.0 599.0   3. Acute kidney injury (HCC)  N17.9 584.9   4. Anemia, unspecified type  D64.9 285.9   5. Dementia with behavioral disturbance, unspecified dementia type (HCC)  F03.91 294.21     Patient Active Problem List   Diagnosis   • Essential hypertension   • Hyperlipidemia   • Hypothyroidism, adult   • Iron deficiency anemia due to chronic blood loss   • Macrocytic anemia   • AVM (arteriovenous malformation) of small bowel, acquired with hemorrhage   • S/P AVR   • Cognitive change   • Paroxysmal atrial fibrillation (HCC)   • Sinus bradycardia   • Bilateral carotid artery stenosis   • Generalized weakness   • Dementia (HCC)   • JOSÉ LUIS (acute kidney injury) ([facility])   • Stage 3 chronic kidney disease (HCC)   • Lung nodule   • CAD (coronary artery disease)   • Leukocytosis   • Anemia, chronic disease   • Abnormal LFTs     Past Medical History:   Diagnosis Date   • Abnormal electrocardiogram    • Anemia    • Aortic stenosis     severe; s/p repair   • Asthma     HISTORY   • Bilateral carotid artery stenosis 2020   • Bradycardia    • Coronary artery disease    • Diastolic dysfunction     grade II   • Environmental allergies    • Female stress incontinence    • Hemolytic anemia, mechanical ([facility]) 2016   • History of diverticulosis    • History of transfusion    • Hyperlipidemia    • Hypertension    • Hypokalemia    • Hypothyroid    • Iron deficiency anemia     hemolytic anemia   • Iron deficiency anemia due to chronic blood loss 2016   • Left ventricular hypertrophy    • Murmur    • PAF (paroxysmal atrial fibrillation) ([facility])     with RVR   • Pleural effusion     bilateral, post op   • Psoriasis    • Tendinitis    • Thoracic aorta atherosclerosis ([facility])      Past Surgical History:   Procedure  Laterality Date   • AORTIC VALVE REPAIR/REPLACEMENT N/A 12/16/2016    Procedure: BHARGAVI MINI STERNOTOMY AORTIC VALVE REPLACEMENT;  Surgeon: Robin Jones MD;  Location: [facility] MAIN OR;  Service:    • APPENDECTOMY  06/1972   • CAPSULE ENDOSCOPY  2015   • CARDIAC CATHETERIZATION N/A 8/25/2016    Procedure: Coronary angiography;  Surgeon: Lulú Hooper MD;  Location: [facility] CATH INVASIVE LOCATION;  Service:    • CARDIAC CATHETERIZATION N/A 8/25/2016    Procedure: Right Heart Cath;  Surgeon: Lulú Hooper MD;  [facility] LOC[facility];  Service:    • CATARACT EXTRACTION Bilateral     Left 12/10/13, Right 12/17/2013   • CATARACT EXTRACTION     • COLONOSCOPY  06/22/2015    Dr. Mohamud   • DENTAL PROCEDURE  2011    Implants   • ENDOSCOPY  06/22/2015    Dr. Mohamud   • ENTEROSCOPY SMALL BOWEL N/A 11/21/2016    Procedure: ENTEROSCOPY SMALL BOWEL WITH APC CAUTERY;  Surgeon: Tj Torres MD;  [facility] END[facility];  Service:    • PELVIC LAPAROSCOPY     • STERNOTOMY      mini   • TONSILLECTOMY     • TUBAL ABDOMINAL LIGATION        General Information     Row Name 11/03/21 1125          Physical Therapy Time and Intention    Document Type therapy note (daily note)  -PH     Mode of Treatment physical therapy  -PH     Row Name 11/03/21 1125          General Information    Existing Precautions/Restrictions fall  -PH     Row Name 11/03/21 1125          Safety Issues, Functional Mobility    Impairments Affecting Function (Mobility) balance; cognition; coordination; endurance/activity tolerance; postural/trunk control; pain; strength  -PH     Cognitive Impairments, Mobility Safety/Performance insight into deficits/self-awareness  -PH     Comment, Safety Issues/Impairments (Mobility) gt belt and non skid socks  -PH           User Key  (r) = Recorded By, (t) = Taken By, (c) = Cosigned By    Initials Name Provider Type    PH Carol Cedillo PTA Physical Therapy Assistant               Mobility     Row  Name 11/03/21 1126          Bed Mobility    Bed Mobility supine-sit  -PH     Supine-Sit [place] (Bed Mobility) verbal cues; nonverbal cues (demo/gesture); minimum assist (75% patient effort)  -PH     Assistive Device (Bed Mobility) bed rails; head of bed elevated  -PH     Comment (Bed Mobility) a little extra time to process  -PH     Row Name 11/03/21 1126          Transfers    Comment (Transfers) no lean noted today  -PH     Row Name 11/03/21 1126          Bed-Chair Transfer    Bed-Chair [place] (Transfers) not tested  -PH     Row Name 11/03/21 1126          Sit-Stand Transfer    Sit-Stand [place] (Transfers) minimum assist (75% patient effort); 2 person assist; verbal cues; nonverbal cues (demo/gesture)  -PH     Assistive Device (Sit-Stand Transfers) walker, front-wheeled  -PH     Row Name 11/03/21 1126          Gait/Stairs (Locomotion)    [place] Level (Gait) minimum assist (75% patient effort); 2 person assist; verbal cues; nonverbal cues (demo/gesture)  -PH     Assistive Device (Gait) walker, front-wheeled  -PH     Distance in Feet (Gait) 12' to chair  -PH     Deviations/Abnormal Patterns (Gait) [place] decreased; festinating/shuffling; gait speed decreased; stride length decreased; base of support, narrow  -PH     Bilateral Gait Deviations forward flexed posture  -PH     [place] Level (Stairs) not tested  -PH     Comment (Gait/Stairs) pt very slow, unsteady w/ 1 LOB when turning to sit in chair req min a to correct  -PH           User Key  (r) = Recorded By, (t) = Taken By, (c) = Cosigned By    Initials Name Provider Type    PH Carol Cedillo PTA Physical Therapy Assistant               Obj/Interventions     Row Name 11/03/21 1128          Motor Skills    Therapeutic Exercise other (see comments)  BAP, LAQ, seated march; x 10 reps; very slow w/ vc /tc to incr ROM  -PH     Row Name 11/03/21 1128          Balance    Balance Assessment sitting static balance  -PH     Static  Sitting Balance WFL; supported; sitting, edge of bed  -PH           User Key  (r) = Recorded By, (t) = Taken By, (c) = Cosigned By    Initials Name Provider Type     Carol Cedillo PTA Physical Therapy Assistant               Goals/Plan    No documentation.                Clinical Impression     Row Name 11/03/21 1129          Pain    Additional Documentation Pain Scale: FACES Pre/Post-Treatment (Group)  -PH     Row Name 11/03/21 1129          Pain Scale: Numbers Pre/Post-Treatment    Pain Intervention(s) Repositioned; Rest  -PH     Row Name 11/03/21 1129          Pain Scale: FACES Pre/Post-Treatment    Pain: FACES Scale, Pretreatment 2-->hurts little bit  -PH     Posttreatment Pain Rating 4-->hurts little more  -PH     Pain Location - Side Right  -PH     Pain Location foot  -PH     Row Name 11/03/21 1129          Plan of Care Review    Plan of Care Reviewed With patient  -PH     Progress no change  -PH     Outcome Summary Pt sitting in bed at beg of session. Pt req min A to sit up to EOB - less assist req. Pt stood req min A x 2 and use of fww. Pt amb 12' to chair w/ gait unsteady, slow, shuffling w/ 1 LOB as pt turned to chair req min A to correct. Pt req min A x 2 w/ use of fww for gait. Pt performed BLE Ther ex in chair w/ vc / tc to incr ROM. PT will prog as pt rosa.  -PH     Row Name 11/03/21 1129          Positioning and Restraints    Pre-Treatment Position in bed  -PH     Post Treatment Position chair  -PH     In Chair reclined; call light within reach; encouraged to call for assist; exit alarm on; notified nsg  -PH           User Key  (r) = Recorded By, (t) = Taken By, (c) = Cosigned By    Initials Name Provider Type     Carol Cedillo PTA Physical Therapy Assistant               Outcome Measures     Row Name 11/03/21 1132          How much help from another person do you currently need...    Turning from your back to your side while in flat bed without using bedrails? 3  -PH     Moving  from lying on back to sitting on the side of a flat bed without bedrails? 3  -PH     Moving to and from a bed to a chair (including a wheelchair)? 2  -PH     Standing up from a chair using your arms (e.g., wheelchair, bedside chair)? 2  -PH     Climbing 3-5 steps with a railing? 1  -PH     To walk in hospital room? 2  -PH     AM-PAC 6 Clicks Score (PT) 13  -PH     Row Name 11/03/21 1132          Functional Assessment    Outcome Measure Options AM-PAC 6 Clicks Basic Mobility (PT)  -PH           User Key  (r) = Recorded By, (t) = Taken By, (c) = Cosigned By    Initials Name Provider Type    PH Carol Cedillo PTA Physical Therapy Assistant                             Physical Therapy Education                 Title: PT OT SLP Therapies (Done)     Topic: Physical Therapy (Done)     Point: Mobility training (Done)     Learning Progress Summary           Patient Acceptance, E,D, VU,NR by  at 11/3/2021 1132    Acceptance, E,TB,D, VU,DU,NR by  at 11/2/2021 2332    Acceptance, E,TB,D, VU,NR by  at 11/2/2021 1205    Acceptance, E,TB,D, VU,DU,NR by  at 11/1/2021 2044    Acceptance, E,TB,D, NR,NL by  at 11/1/2021 1211                   Point: Home exercise program (Done)     Learning Progress Summary           Patient Acceptance, E,D, VU,NR by  at 11/3/2021 1132    Acceptance, E,TB,D, VU,DU,NR by  at 11/2/2021 2332    Acceptance, E,TB,D, VU,DU,NR by  at 11/1/2021 2044                   Point: Body mechanics (Done)     Learning Progress Summary           Patient Acceptance, E,D, VU,NR by  at 11/3/2021 1132    Acceptance, E,TB,D, VU,DU,NR by  at 11/2/2021 2332    Acceptance, E,TB,D, VU,NR by  at 11/2/2021 1205    Acceptance, E,TB,D, VU,DU,NR by  at 11/1/2021 2044                   Point: Precautions (Done)     Learning Progress Summary           Patient Acceptance, E,D, VU,NR by  at 11/3/2021 1132    Acceptance, E,ANT HAMPTON, TERRI,CHARAN,NR by  at 11/2/2021 2332    Acceptance, EBERTA D, TERRI,NR by  at  11/2/2021 1205    Acceptance, E,TB,D, VU,DU,NR by  at 11/1/2021 2044    Acceptance, E,TB,D, NR,NL by  at 11/1/2021 1211                               User Key     Initials Effective Dates Name Provider Type Discipline     06/16/21 -  Christelle Leija, RN Registered Nurse Nurse     06/16/21 -  Carol Cedillo PTA Physical Therapy Assistant PT    CB 10/22/21 -  Jossie Guerra PT Physical Therapist PT              PT Recommendation and Plan     Plan of Care Reviewed With: patient  Progress: no change  Outcome Summary: Pt sitting in bed at beg of session. Pt req min A to sit up to EOB - less assist req. Pt stood req min A x 2 and use of fww. Pt amb 12' to chair w/ gait unsteady, slow, shuffling w/ 1 LOB as pt turned to chair req min A to correct. Pt req min A x 2 w/ use of fww for gait. Pt performed BLE Ther ex in chair w/ vc / tc to incr ROM. PT will prog as pt rosa.     Time Calculation:    PT Charges     Row Name 11/03/21 1133             Time Calculation    Start Time 0933  -PH      Stop Time 0947  -PH      Time Calculation (min) 14 min  -PH              Timed Charges    57879 - PT Therapeutic Exercise Minutes 4  -PH      51616 - PT Therapeutic Activity Minutes 10  -PH              Total Minutes    Timed Charges Total Minutes 14  -PH       Total Minutes 14  -PH            User Key  (r) = Recorded By, (t) = Taken By, (c) = Cosigned By    Initials Name Provider Type     Carol Cedillo PTA Physical Therapy Assistant              Therapy Charges for Today     Code Description Service Date Service Provider Modifiers Qty    02163354362 HC PT THERAPEUTIC ACT EA 15 MIN 11/3/2021 Carol Cedillo PTA GP 1    21134364293 HC PT THER SUPP EA 15 MIN 11/3/2021 Carol Cedillo PTA GP 1          PT G-Codes  Outcome Measure Options: AM-PAC 6 Clicks Basic Mobility (PT)  AM-PAC 6 Clicks Score (PT): 13  AM-PAC 6 Clicks Score (OT): 16  Modified Warrior Scale: 4 - Moderately severe disability.  Unable  to walk without assistance, and unable to attend to own bodily needs without assistance.    Carol Cedillo, PTA  11/3/2021

## 2021-11-03 NOTE — PROGRESS NOTES
Name: Dory Hampton ADMIT: 10/29/2021   : 1940  PCP: Shantel Michelle MD    MRN: [id number] LOS: 5 days   AGE/SEX: 81 y.o. female  ROOM: [facility]/     Subjective   Subjective   Sitting up in chair. Much more alert per nursing this morning. She was able to sit up and eat. More talkative. CT head repeated and negative. Awaiting placement. Patient denies      Objective   Objective   Vital Signs  Temp:  [97.4 °F (36.3 °C)-98.1 °F (36.7 °C)] 97.6 °F (36.4 °C)  Heart Rate:  [53-69] 69  Resp:  [16-18] 16  BP: (119-139)/(52-76) 131/76  SpO2:  [95 %-96 %] 96 %  on   ;   Device (Oxygen Therapy): room air  Body mass index is 18.96 kg/m².     Physical Exam  Vitals and nursing note reviewed.   Constitutional:       Appearance: She is ill-appearing. She is not toxic-appearing.   Cardiovascular:      Rate and Rhythm: Normal rate and regular rhythm.      Pulses: Normal pulses.      Heart sounds: Normal heart sounds.   Pulmonary:      Effort: Pulmonary effort is normal. No respiratory distress.      Breath sounds: Normal breath sounds.   Abdominal:      General: Bowel sounds are normal. There is no distension.      Palpations: Abdomen is soft.      Tenderness: There is no abdominal tenderness.   Musculoskeletal:         General: No swelling or tenderness. Normal range of motion.      Cervical back: Normal range of motion and neck supple.   Skin:     General: Skin is warm and dry.      Findings: No bruising. Pale.  Neurological:      General: No focal deficit present.      Mental Status: She is alert and oriented to person/place/year. Mildly forgetful. Quiet/generally weak.     Sensory: No sensory deficit.      Motor: Weakness present.      Coordination: Coordination normal.   Psychiatric:         Mood and Affect: Mood normal.         Behavior: Behavior normal    Results Review:       I reviewed the patient's new clinical results.  Results from last 7 days   Lab Units 21  0714 21  0615 21  0708  10/31/21  0544   WBC 10*3/mm3 6.40 6.57 7.15 6.60   HEMOGLOBIN g/dL 10.0* 9.8* 10.6* 10.1*   PLATELETS 10*3/mm3 212 181 204 185     Results from last 7 days   Lab Units 11/03/21  0714 11/02/21  0615 11/01/21  0708 10/31/21  0544   SODIUM mmol/L 143 142 145 145   POTASSIUM mmol/L 3.7 4.0 4.0 3.3*   CHLORIDE mmol/L 109* 109* 108* 110*   CO2 mmol/L 23.9 24.0 26.1 24.3   BUN mg/dL 25* 22 15 21   CREATININE mg/dL 0.97 1.02* 1.07* 1.04*   GLUCOSE mg/dL 81 91 105* 88   Estimated Creatinine Clearance: 36 mL/min (by C-G formula based on SCr of 0.97 mg/dL).  Results from last 7 days   Lab Units 11/03/21 0714 11/02/21 0615 11/01/21  0708 10/31/21  0544   ALBUMIN g/dL 3.40* 3.30* 3.70 3.70   BILIRUBIN mg/dL 0.4 0.3 0.4 0.3   ALK PHOS U/L 50 51 59 56   AST (SGOT) U/L 44* 75* 107* 126*   ALT (SGPT) U/L 22 28 36* 37*     Results from last 7 days   Lab Units 11/03/21  0714 11/02/21  0615 11/01/21  0708 10/31/21  0544 10/30/21  0846 10/29/21  1414   CALCIUM mg/dL 8.8 8.8 9.1 8.5*   < > 9.2   ALBUMIN g/dL 3.40* 3.30* 3.70 3.70   < > 4.10   MAGNESIUM mg/dL  --   --   --   --   --  2.3    < > = values in this interval not displayed.     Results from last 7 days   Lab Units 10/30/21  1022 10/30/21  0846 10/29/21  1745   PROCALCITONIN ng/mL  --   --  2.74*   LACTATE mmol/L 1.9 2.2* 0.9     No results found for: HGBA1C, POCGLU    aspirin, 81 mg, Oral, Daily  azelastine, 1 spray, Each Nare, BID  donepezil, 10 mg, Oral, Nightly  enoxaparin, 40 mg, Subcutaneous, Q24H  levothyroxine, 50 mcg, Oral, Daily  multivitamin with minerals, 1 tablet, Oral, Daily  OLANZapine, 2.5 mg, Oral, Daily Before Supper  sodium chloride, 10 mL, Intravenous, Q12H  vitamin B-12, 1,000 mcg, Oral, Daily  vitamin B-6, 100 mg, Oral, Daily       Diet Regular; Cardiac       Assessment/Plan     Active Hospital Problems    Diagnosis  POA   • **Generalized weakness [R53.1]  Yes   • Lung nodule [R91.1]  Yes   • CAD (coronary artery disease) [I25.10]  Yes   • Leukocytosis  [D72.829]  Yes   • Anemia, chronic disease [D63.8]  Yes   • Abnormal LFTs [R94.5]  Yes   • Dementia (HCC) [F03.90]  Yes   • JOSÉ LUIS (acute kidney injury) (HCC) [N17.9]  Yes   • Stage 3 chronic kidney disease (HCC) [N18.30]  Yes   • Paroxysmal atrial fibrillation (HCC) [I48.0]  Yes   • Cognitive change [R41.89]  Yes   • S/P AVR [Z95.2]  Not Applicable   • Essential hypertension [I10]  Yes      Resolved Hospital Problems   No resolved problems to display.     Ms. Hampton is a 81 year old female who presented to the hospital after she was found down and confused at her facility. There was some concern for leftward lean by staff.     · Generalized weakness: Multifactorial at this point. CT head negative x 2. Neurology was consulted and MRI negative for acute abnormalities so neuro signed off. Outpatient EMG/NCS per neuro. Lethargy improving.  · Leukocytosis: Resolved. Had higher than normal procal. Continue empiric abx for presumed infection at this point- 5 days total. She did report increased urination, but UA is not that impressive. CT A/P negative, but did show a lung nodule. CT chest confirms nodule with some concerns for malignancy (which could also explain elevated procal). Oncology spoke with family- plan to follow up CT chest in future.  · CAD/s/p AVR/PAF: NSR with no recurrence of afib since her valve replacement. She is followed by Dr. Bryan.  · JOSÉ LUIS on CKD3: Creatinine up to 1.7 with baseline closer to 1.3. Creatinine better than baseline here.  · Dementia: Waxing and waning mental status. Imaging has been unrevealing. Continue delirium precautions. Hopefully mental status will improve as her mobility does at rehab.  · HTN: BP stable. Monitor.   · Anemia: Chronic and stable. Monitor trends.  · Abnormal LFTs: Trending down.  · Hypokalemia: Replete.     Discussed with patient and Dr. Watson.     VTE Prophylaxis - Lovenox.  Code Status - Full code  Disposition - Anticipate discharge to rehab tomorrow.     Carlene TOBIAS  KEVIN Thompson  [facility]  11/03/21  14:08 EDT

## 2021-11-03 NOTE — PLAN OF CARE
Goal Outcome Evaluation:  Plan of Care Reviewed With: patient           Outcome Summary: Clinical swallow eval completed. Recommend continue with regular and thins, meds whole with thin/puree. Recommend general aspiration precautions. SLP to s/o, please re-consult as warranted.    Patient was not wearing a face mask during this therapy encounter. Therapist used appropriate personal protective equipment including mask, eye protection and gloves.  Mask used was standard procedure mask. Appropriate PPE was worn during the entire therapy session. Hand hygiene was completed before and after therapy session. Patient is not in enhanced droplet precautions.

## 2021-11-03 NOTE — THERAPY EVALUATION
Acute Care - Speech Language Pathology   Swallow Initial Evaluation [facility]     Patient Name: Dory Hampton  : 1940  MRN: [id number]  Today's Date: 11/3/2021               Admit Date: 10/29/2021    Visit Dx:     ICD-10-CM ICD-9-CM   1. Weakness  R53.1 780.79   2. Acute UTI  N39.0 599.0   3. Acute kidney injury (HCC)  N17.9 584.9   4. Anemia, unspecified type  D64.9 285.9   5. Dementia with behavioral disturbance, unspecified dementia type (HCC)  F03.91 294.21     Patient Active Problem List   Diagnosis   • Essential hypertension   • Hyperlipidemia   • Hypothyroidism, adult   • Iron deficiency anemia due to chronic blood loss   • Macrocytic anemia   • AVM (arteriovenous malformation) of small bowel, acquired with hemorrhage   • S/P AVR   • Cognitive change   • Paroxysmal atrial fibrillation (HCC)   • Sinus bradycardia   • Bilateral carotid artery stenosis   • Generalized weakness   • Dementia ([facility])   • JOSÉ LUIS (acute kidney injury) ([facility])   • Stage 3 chronic kidney disease (HCC)   • Lung nodule   • CAD (coronary artery disease)   • Leukocytosis   • Anemia, chronic disease   • Abnormal LFTs     Past Medical History:   Diagnosis Date   • Abnormal electrocardiogram    • Anemia    • Aortic stenosis     severe; s/p repair   • Asthma     HISTORY   • Bilateral carotid artery stenosis 2020   • Bradycardia    • Coronary artery disease    • Diastolic dysfunction     grade II   • Environmental allergies    • Female stress incontinence    • Hemolytic anemia, mechanical (HCC) 2016   • History of diverticulosis    • History of transfusion    • Hyperlipidemia    • Hypertension    • Hypokalemia    • Hypothyroid    • Iron deficiency anemia     hemolytic anemia   • Iron deficiency anemia due to chronic blood loss 2016   • Left ventricular hypertrophy    • Murmur    • PAF (paroxysmal atrial fibrillation) (HCC)     with RVR   • Pleural effusion     bilateral, post op   • Psoriasis    • Tendinitis    • Thoracic  aorta atherosclerosis (HCC)      Past Surgical History:   Procedure Laterality Date   • AORTIC VALVE REPAIR/REPLACEMENT N/A 12/16/2016    Procedure: BHARGAVI MINI STERNOTOMY AORTIC VALVE REPLACEMENT;  Surgeon: Robin Jones MD;  Location: [facility] MAIN OR;  Service:    • APPENDECTOMY  06/1972   • CAPSULE ENDOSCOPY  2015   • CARDIAC CATHETERIZATION N/A 8/25/2016    Procedure: Coronary angiography;  Surgeon: Lulú Hooper MD;  Location:  [facility] CATH INVASIVE LOCATION;  Service:    • CARDIAC CATHETERIZATION N/A 8/25/2016    Procedure: Right Heart Cath;  Surgeon: Lulú Hooper MD;  Location:  [facility] CATH [facility]VASIVE [facility]ATION;  Service:    • CATARACT EXTRACTION Bilateral     Left 12/10/13, Right 12/17/2013   • CATARACT EXTRACTION     • COLONOSCOPY  06/22/2015    Dr. Mohamud   • DENTAL PROCEDURE  2011    Implants   • ENDOSCOPY  06/22/2015    Dr. Mohamud   • ENTEROSCOPY SMALL BOWEL N/A 11/21/2016    Procedure: ENTEROSCOPY SMALL BOWEL WITH APC CAUTERY;  Surgeon: Tj Torres MD;  Location:  [facility] ENDOSCOPY;  Service:    • PELVIC LAPAROSCOPY     • STERNOTOMY      mini   • TONSILLECTOMY     • TUBAL ABDOMINAL LIGATION         SLP Recommendation and Plan  SLP Swallowing Diagnosis: swallow WFL (11/03/21 1400)  SLP Diet Recommendation: regular textures, thin liquids (11/03/21 1400)  Recommended Precautions and Strategies: upright posture during/after eating, small bites of food and sips of liquid, general aspiration precautions (11/03/21 1400)  SLP Rec. for Method of Medication Administration: meds whole, with thin liquids, with pudding or applesauce, as tolerated (11/03/21 1400)     Monitor for Signs of Aspiration: yes, notify SLP if any concerns (11/03/21 1400)     Swallow Criteria for Skilled Therapeutic Interventions Met: baseline status, no problems identified which require skilled intervention (11/03/21 1400)  Anticipated Discharge Disposition (SLP): unknown (11/03/21 1400)  Rehab Potential/Prognosis, Swallowing: good, to  achieve stated therapy goals (11/03/21 1400)  Therapy Frequency (Swallow): PRN (11/03/21 1400)  Predicted Duration Therapy Intervention (Days): until discharge (11/03/21 1400)                         Plan of Care Reviewed With: patient  Outcome Summary: Clinical swallow eval completed. Recommend continue with regular and thins, meds whole with thin/puree. Recommend general aspiration precautions. SLP to s/o, please re-consult as warranted.      SWALLOW EVALUATION (last 72 hours)     SLP Adult Swallow Evaluation     Row Name 11/03/21 1400                   Rehab Evaluation    Document Type evaluation  -OC        Subjective Information no complaints  -OC        Patient Observations alert; cooperative; agree to therapy  -OC        Care Plan Review evaluation/treatment results reviewed  -OC        Patient Effort good  -OC        Symptoms Noted During/After Treatment none  -OC                  General Information    Patient Profile Reviewed yes  -OC        Pertinent History Of Current Problem Patient admitted s/p being found [place], generalized weakenss, worsening mental status, hx dementia, HTN, hypothroidism, CAD.  -OC        Current Method of Nutrition regular textures; thin liquids  -OC        Precautions/Limitations, Vision WFL  -OC        Precautions/Limitations, Hearing WFL  -OC        Prior Level of Function-Communication cognitive-linguistic impairment  -OC        Prior Level of Function-Swallowing no diet consistency restrictions  -OC        Plans/Goals Discussed with patient and family; agreed upon  -OC        Barriers to Rehab none identified  -OC        Patient's Goals for Discharge patient did not state  -OC        Family Goals for Discharge family did not state  -OC                  Pain Scale: Numbers Pre/Post-Treatment    Pretreatment Pain Rating 5/10 foot pain -OC        Posttreatment Pain Rating 5/10 foot pain -OC                  Oral Motor Structure and Function    Dentition Assessment natural, present  and adequate  -OC        Secretion Management WNL/WFL  -OC        Mucosal Quality moist, healthy  -OC        Volitional Swallow WFL  -OC        Volitional Cough WFL  -OC                  Oral Musculature and Cranial Nerve Assessment    Oral Motor General Assessment generalized oral motor weakness  -OC                  Clinical Swallow Eval    Clinical Swallow Evaluation Summary Clinical swallow eval completed. Patient presents with functional swallow. No overt s/s aspiraiton with ice chips, thin via cup/straw, puree, mech soft, and regular textures. Functional mastication with no oral residue.  -OC                  Clinical Impression    SLP Swallowing Diagnosis swallow WFL  -OC        Functional Impact no impact on function  -OC        Rehab Potential/Prognosis, Swallowing good, to achieve stated therapy goals  -OC        Swallow Criteria for Skilled Therapeutic Interventions Met baseline status; no problems identified which require skilled intervention  -OC                  Recommendations    Therapy Frequency (Swallow) PRN  -OC        Predicted Duration Therapy Intervention (Days) until discharge  -OC        SLP Diet Recommendation regular textures; thin liquids  -OC        Recommended Precautions and Strategies upright posture during/after eating; small bites of food and sips of liquid; general aspiration precautions  -OC        Oral Care Recommendations Oral Care BID/PRN  -OC        SLP Rec. for Method of Medication Administration meds whole; with thin liquids; with pudding or applesauce; as tolerated  -OC        Monitor for Signs of Aspiration yes; notify SLP if any concerns  -OC        Anticipated Discharge Disposition (SLP) unknown  -OC              User Key  (r) = Recorded By, (t) = Taken By, (c) = Cosigned By    Initials Name Effective Dates    OC Vanessa Yang MA,[facility]-SLP 06/16/21 -                 EDUCATION  The patient has been educated in the following areas:   Dysphagia (Swallowing Impairment).          SLP Outcome Measures (last 72 hours)     SLP Outcome Measures     Row Name 11/03/21 1500             SLP Outcome Measures    Outcome Measure Used? Adult NOMS  -OC              Adult FCM Scores    FCM Chosen Swallowing  -OC      Swallowing FCM Score 6  -OC            User Key  (r) = Recorded By, (t) = Taken By, (c) = Cosigned By    Initials Name Effective Dates    Vanessa Wade MA,[facility]-SLP 06/16/21 -                  Time Calculation:    Time Calculation- SLP     Row Name 11/03/21 1607             Time Calculation- SLP    SLP Start Time 1400  -OC      SLP Received On 11/03/21  -OC              Untimed Charges    75784-AF Eval Oral Pharyng Swallow Minutes 60  -OC              Total Minutes    Untimed Charges Total Minutes 60  -OC       Total Minutes 60  -OC            User Key  (r) = Recorded By, (t) = Taken By, (c) = Cosigned By    Initials Name Provider Type    Vanessa Wade MA,[facility]-SLP Speech and Language Pathologist                Therapy Charges for Today     Code Description Service Date Service Provider Modifiers Qty    [id number] HC ST EVAL ORAL PHARYNG SWALLOW 4 11/3/2021 Vanessa Yang MA,[facility]-SLP GN 1               Vanessa Yang MA, CCC-SLP  11/3/2021

## 2021-11-04 ENCOUNTER — TELEPHONE (OUTPATIENT)
Dept: ONCOLOGY | Facility: CLINIC | Age: 81
End: 2021-11-04

## 2021-11-04 VITALS
WEIGHT: 110.45 LBS | HEART RATE: 69 BPM | OXYGEN SATURATION: 95 % | DIASTOLIC BLOOD PRESSURE: 94 MMHG | BODY MASS INDEX: 18.86 KG/M2 | SYSTOLIC BLOOD PRESSURE: 135 MMHG | HEIGHT: 64 IN | RESPIRATION RATE: 16 BRPM | TEMPERATURE: 98.2 F

## 2021-11-04 DIAGNOSIS — R91.1 LUNG NODULE: Primary | ICD-10-CM

## 2021-11-04 PROBLEM — D72.829 LEUKOCYTOSIS: Status: RESOLVED | Noted: 2021-10-30 | Resolved: 2021-11-04

## 2021-11-04 PROBLEM — R41.89 COGNITIVE CHANGE: Status: RESOLVED | Noted: 2019-12-02 | Resolved: 2021-11-04

## 2021-11-04 PROBLEM — N17.9 AKI (ACUTE KIDNEY INJURY) (HCC): Status: RESOLVED | Noted: 2021-10-29 | Resolved: 2021-11-04

## 2021-11-04 LAB
ALBUMIN SERPL-MCNC: 3.2 G/DL (ref 3.5–5.2)
ALBUMIN/GLOB SERPL: 1.5 G/DL
ALP SERPL-CCNC: 44 U/L (ref 39–117)
ALT SERPL W P-5'-P-CCNC: 18 U/L (ref 1–33)
ANION GAP SERPL CALCULATED.3IONS-SCNC: 8.2 MMOL/L (ref 5–15)
AST SERPL-CCNC: 35 U/L (ref 1–32)
BASOPHILS # BLD AUTO: 0.06 10*3/MM3 (ref 0–0.2)
BASOPHILS NFR BLD AUTO: 1 % (ref 0–1.5)
BILIRUB SERPL-MCNC: 0.4 MG/DL (ref 0–1.2)
BUN SERPL-MCNC: 20 MG/DL (ref 8–23)
BUN/CREAT SERPL: 23.8 (ref 7–25)
CALCIUM SPEC-SCNC: 8.5 MG/DL (ref 8.6–10.5)
CHLORIDE SERPL-SCNC: 108 MMOL/L (ref 98–107)
CO2 SERPL-SCNC: 24.8 MMOL/L (ref 22–29)
CREAT SERPL-MCNC: 0.84 MG/DL (ref 0.57–1)
DEPRECATED RDW RBC AUTO: 57.9 FL (ref 37–54)
EOSINOPHIL # BLD AUTO: 0.2 10*3/MM3 (ref 0–0.4)
EOSINOPHIL NFR BLD AUTO: 3.3 % (ref 0.3–6.2)
ERYTHROCYTE [DISTWIDTH] IN BLOOD BY AUTOMATED COUNT: 15.8 % (ref 12.3–15.4)
GFR SERPL CREATININE-BSD FRML MDRD: 65 ML/MIN/1.73
GLOBULIN UR ELPH-MCNC: 2.2 GM/DL
GLUCOSE SERPL-MCNC: 84 MG/DL (ref 65–99)
HCT VFR BLD AUTO: 27.7 % (ref 34–46.6)
HGB BLD-MCNC: 9.3 G/DL (ref 12–15.9)
IMM GRANULOCYTES # BLD AUTO: 0.02 10*3/MM3 (ref 0–0.05)
IMM GRANULOCYTES NFR BLD AUTO: 0.3 % (ref 0–0.5)
LYMPHOCYTES # BLD AUTO: 2.11 10*3/MM3 (ref 0.7–3.1)
LYMPHOCYTES NFR BLD AUTO: 34.6 % (ref 19.6–45.3)
MCH RBC QN AUTO: 33.8 PG (ref 26.6–33)
MCHC RBC AUTO-ENTMCNC: 33.6 G/DL (ref 31.5–35.7)
MCV RBC AUTO: 100.7 FL (ref 79–97)
MONOCYTES # BLD AUTO: 0.45 10*3/MM3 (ref 0.1–0.9)
MONOCYTES NFR BLD AUTO: 7.4 % (ref 5–12)
NEUTROPHILS NFR BLD AUTO: 3.26 10*3/MM3 (ref 1.7–7)
NEUTROPHILS NFR BLD AUTO: 53.4 % (ref 42.7–76)
NRBC BLD AUTO-RTO: 0.2 /100 WBC (ref 0–0.2)
PLATELET # BLD AUTO: 208 10*3/MM3 (ref 140–450)
PMV BLD AUTO: 9.5 FL (ref 6–12)
POTASSIUM SERPL-SCNC: 3.6 MMOL/L (ref 3.5–5.2)
PROT SERPL-MCNC: 5.4 G/DL (ref 6–8.5)
RBC # BLD AUTO: 2.75 10*6/MM3 (ref 3.77–5.28)
SODIUM SERPL-SCNC: 141 MMOL/L (ref 136–145)
WBC # BLD AUTO: 6.1 10*3/MM3 (ref 3.4–10.8)

## 2021-11-04 PROCEDURE — 85025 COMPLETE CBC W/AUTO DIFF WBC: CPT | Performed by: INTERNAL MEDICINE

## 2021-11-04 PROCEDURE — 80053 COMPREHEN METABOLIC PANEL: CPT | Performed by: INTERNAL MEDICINE

## 2021-11-04 RX ORDER — OLANZAPINE 2.5 MG/1
2.5 TABLET ORAL
Start: 2021-11-04 | End: 2021-11-04 | Stop reason: HOSPADM

## 2021-11-04 RX ADMIN — ASPIRIN 81 MG: 81 TABLET, COATED ORAL at 09:45

## 2021-11-04 RX ADMIN — Medication 1 TABLET: at 09:45

## 2021-11-04 RX ADMIN — LEVOTHYROXINE SODIUM 50 MCG: 0.05 TABLET ORAL at 09:45

## 2021-11-04 RX ADMIN — Medication 1000 MCG: at 09:45

## 2021-11-04 RX ADMIN — SODIUM CHLORIDE, PRESERVATIVE FREE 10 ML: 5 INJECTION INTRAVENOUS at 09:46

## 2021-11-04 RX ADMIN — AZELASTINE HYDROCHLORIDE 1 SPRAY: 137 SPRAY, METERED NASAL at 09:45

## 2021-11-04 RX ADMIN — Medication 100 MG: at 09:45

## 2021-11-04 NOTE — PROGRESS NOTES
Discharge Planning Assessment  [facility]     Patient Name: Dory Hampton  MRN: [id number]  Today's Date: 11/4/2021    Admit Date: 10/29/2021     Discharge Needs Assessment    No documentation.                Discharge Plan     Row Name 11/04/21 1002       Plan    Plan [facility] skilled nursing auth approved from [employer] and has a bed available today    Plan Comments Call received from Kerri/Lara [facility] can accept today or tomorrow. Roshni JONES              Continued Care and Services - Admitted Since 10/29/2021     Destination Coordination complete.    Service Provider Request Status Selected Services Address Phone Fax Patient Preferred    [facility]   Selected Skilled Nursing [address] [facility] [phone] --       Internal Comment last updated by Noemy Kahn RN 11/3/2021 1134    .11/3/2021 [facility] will accept pending pre-cert .Noemy Kahn RN                 [facility]  Accepted N/A [address] [facility] [address] [phone] --    [facility]  Pending - Request Sent N/A [address] [facility] [id number] [phone] --    [facility]  Pending - Request Sent N/A [address] [address] [phone] --              Expected Discharge Date and Time     Expected Discharge Date Expected Discharge Time    Nov 4, 2021          Demographic Summary    No documentation.                Functional Status    No documentation.                Psychosocial    No documentation.                Abuse/Neglect    No documentation.                Legal    No documentation.                Substance Abuse    No documentation.                Patient Forms    No documentation.                   Marta Fay, RN

## 2021-11-04 NOTE — PLAN OF CARE
Goal Outcome Evaluation:  Plan of Care Reviewed With: patient        Progress: improving  Outcome Summary: Patient back to baseline, now afraid to sleep for fear of not waking up, IV fluids D/C'ed, possible D/C in AM, will CTM

## 2021-11-04 NOTE — PROGRESS NOTES
Patient was discharged prior to being seen today.  I contacted the patient's son by telephone.  We discussed follow-up regarding the lung nodule.  The patient has improved dramatically over the past few days in terms of her mental status, possibly related to Zyprexa.  They do wish to follow-up the lesion, do not wish to pursue an invasive procedure with biopsy.  I agree with this approach.  We will schedule her for a PET scan in 3 to 4 weeks and I will see her shortly after to follow-up results.

## 2021-11-04 NOTE — PROGRESS NOTES
Discharge Planning Assessment  [facility]     Patient Name: Dory Hampton  MRN: [id number]  Today's Date: 11/4/2021    Admit Date: 10/29/2021     Discharge Needs Assessment    No documentation.                Discharge Plan     Row Name 11/04/21 1503       Plan    Plan [facility] skilled rehab    Final Discharge Disposition Code 03 - skilled nursing facility (SNF)    Final Note [facility] skilled rehab              Continued Care and Services - Discharged on 11/4/2021 Admission date: 10/29/2021 - Discharge disposition: Skilled Nursing Facility (DC - External)    Destination Coordination complete.    Service Provider Request Status Selected Services Address Phone Fax Patient Preferred    [facility]   Selected Skilled Nursing [address] [phone] --       Internal Comment last updated by Noemy Kahn RN 11/3/2021 1134    .11/3/2021 [facility] will accept pending pre-cert .Noemy Kahn RN                 [facility]  Accepted N/A [address] [facility] [address] [phone] --    [facility]  Pending - Request Sent N/A [address] [facility] [phone] --    [facility]  Pending - Request Sent N/A [address] [facility] [address] [phone] --              Expected Discharge Date and Time     Expected Discharge Date Expected Discharge Time    Nov 4, 2021          Demographic Summary    No documentation.                Functional Status    No documentation.                Psychosocial    No documentation.                Abuse/Neglect    No documentation.                Legal    No documentation.                Substance Abuse    No documentation.                Patient Forms    No documentation.                   Marta Fay, KAREN

## 2021-11-04 NOTE — TELEPHONE ENCOUNTER
----- Message from Prem Keys Jr., MD sent at 11/4/2021  3:33 PM EDT -----  Please schedule PET scan in 3 to 4 weeks and then 2 unit visit Dr. Keys approximately 1 week later with CBC, CMP.  Thanks!

## 2021-11-04 NOTE — DISCHARGE SUMMARY
[facility]    Date of Admission: 10/29/2021  Date of Discharge:  11/4/2021    PCP: Shantel Michelle MD    Discharge Diagnosis:   Active Hospital Problems    Diagnosis  POA   • **Generalized weakness [R53.1]  Yes   • Lung nodule [R91.1]  Yes   • CAD (coronary artery disease) [I25.10]  Yes   • Anemia, chronic disease [D63.8]  Yes   • Abnormal LFTs [R94.5]  Yes   • Dementia (HCC) [F03.90]  Yes   • Stage 3 chronic kidney disease (HCC) [N18.30]  Yes   • Paroxysmal atrial fibrillation (HCC) [I48.0]  Yes   • S/P AVR [Z95.2]  Not Applicable   • Essential hypertension [I10]  Yes      Resolved Hospital Problems    Diagnosis Date Resolved POA   • Leukocytosis [D72.829] 11/04/2021 Yes   • JOSÉ LUIS (acute kidney injury) (HCC) [N17.9] 11/04/2021 Yes   • Cognitive change [R41.89] 11/04/2021 Yes     Procedures Performed       Consults     Date and Time Order Name Status Description    10/31/2021  2:49 PM Hematology & Oncology Inpatient Consult Completed     10/29/2021  7:52 PM Inpatient Neurology Consult General Completed     10/29/2021  5:34 PM [facility] (on-call MD unless specified) Details Completed         Hospital Course  Please see history and physical for details. Patient is a 81 y.o. female that initially presented to the hospital with being found down as well as confusion at her halfway. She recently moved to [facility] for memory care. On admission, she was noted to have a possible infection with elevated procal and mildly abnormal urine as well as some JOSÉ LUIS on CKD. She was admitted for further care.   The patient was seen in consultation by neurology and MRI and CT head negative for any acute findings x 2. Neurology felt this was multifactorial and likely d/t underlying dementia. She did receive Zyprexa during the stay but was not recommended to continue at discharge. She has worked with PT and will need some rehab prior to returning to a lower level of care. Her mentation did  wax and wane with some periods of lethargy, but is vastly improved on the day of discharge.   She was treated with a 5 day course of Rocephin for a presumed infection. She remained afebrile without any further leukocytosis. CT chest did confirmed a right lung nodule which was concerning for malignancy. Oncology was consulted and she can follow up with them in the outpatient setting for consideration of lung biopsy and further treatment if so desired.    She did have some elevated LFTs as well as evidence of JOSÉ LUIS. This is improved with hydration and her vitals have been stable. She denies any chest pain, dyspnea, cough, fever, chills, nausea or vomiting. She is in great spirits on the day of discharge and will need to follow up with oncology as well as her PCP in 1-2 weeks.    Of note, her ARB/HCTZ was held and BP stable. This can further be monitored at rehab and restarted pending her renal function and trends. Would recommend repeat CBC and CMP in 1 week.    I discussed the patient's findings and my recommendations with patient, nursing staff and Dr. Watson.    Condition on Discharge: Improved.     Temp:  [98 °F (36.7 °C)-98.2 °F (36.8 °C)] 98.2 °F (36.8 °C)  Heart Rate:  [54-69] 69  Resp:  [16-18] 16  BP: (111-140)/(46-94) 135/94  Body mass index is 18.96 kg/m².    Physical Exam  Vitals and nursing note reviewed.   Constitutional:       Appearance: She is ill-appearing. She is not toxic-appearing.   Cardiovascular:      Rate and Rhythm: Normal rate and regular rhythm.      Pulses: Normal pulses.      Heart sounds: Normal heart sounds.   Pulmonary:      Effort: Pulmonary effort is normal. No respiratory distress.      Breath sounds: Normal breath sounds.   Abdominal:      General: Bowel sounds are normal. There is no distension.      Palpations: Abdomen is soft.      Tenderness: There is no abdominal tenderness.   Musculoskeletal:         General: No swelling or tenderness. Normal range of motion.      Cervical  back: Normal range of motion and neck supple.   Skin:     General: Skin is warm and dry.      Findings: No bruising. Pale.  Neurological:      General: No focal deficit present.      Mental Status: She is alert and oriented to person/place/year.      Sensory: No sensory deficit.      Motor: Weakness present.      Coordination: Coordination normal.   Psychiatric:         Mood and Affect: Mood normal.         Behavior: Behavior normal        Discharge Medications      Continue These Medications      Instructions Start Date   aspirin 81 MG EC tablet   81 mg, Oral, Daily      azelastine 0.1 % nasal spray  Commonly known as: ASTELIN   INSTILL 2 SPRAYS INTO EACH NOSTRIL TWICE DAILY      Claritin 10 MG tablet  Generic drug: loratadine   10 mg, Oral, Daily      donepezil 10 MG tablet  Commonly known as: ARICEPT   10 mg, Oral, Nightly      multivitamin with minerals tablet tablet   1 tablet, Oral, Daily, PT HOLDING FOR SURGERY      Synthroid 50 MCG tablet  Generic drug: levothyroxine   50 mcg, Oral, Daily      VITAMIN B-12 PO   1,000 mcg, Oral, Daily      VITAMIN B6 PO   100 mg, Oral, Daily         Stop These Medications    losartan-hydrochlorothiazide 100-25 MG per tablet  Commonly known as: HYZAAR           Diet Instructions     Diet: Regular, Cardiac      Discharge Diet:  Regular  Cardiac            Activity Instructions     Activity as Tolerated           Additional Instructions for the Follow-ups that You Need to Schedule     Discharge Follow-up with PCP   As directed       Currently Documented PCP:    Shantel Michelle MD    PCP Phone Number:    [phone]     Follow Up Details: see in 1-2 weeks         Discharge Follow-up with Specified Provider: Dr. Keys with Oncology; 3 Weeks   As directed      To: Dr. Keys with Oncology    Follow Up: 3 Weeks    Follow Up Details: further work up for lung nodule            Contact information for follow-up providers     Shantel Michelle MD .    Specialties: Family Medicine,  Internal Medicine  Why: see in 1-2 weeks  Contact information:  [address] [facility] [address]  [place] [id number]  [phone]             Prem Keys Jr., MD. Schedule an appointment as soon as possible for a visit in 3 week(s).    Specialties: Hematology and Oncology, Oncology, Hematology  Contact information:  [address] [address]  [place] [address]  [phone]                   Contact information for after-discharge care     Destination     [facility] .    Service: Skilled Nursing  Contact information:  [address] [facility] [address] [phone]                           Test Results Pending at Discharge     Carlene Thompson, APRN  11/04/21  13:34 EDT    Discharge time spent greater than 30 minutes.

## 2021-11-04 NOTE — TELEPHONE ENCOUNTER
Hub to read.... I left a message for patient to call me at [phone] to give appt information.

## 2021-11-05 NOTE — TELEPHONE ENCOUNTER
CALLED DAUGHTER NATHAN AND AARON OF THE APPTS FOR HER MOTHER, ASKED IF SHE HAD ANY QUESTIONS TO CALL US BACK - SENT THIS MESS TO MACARIO TO LET HER KNOW THAT I DID LVM

## 2021-11-12 ENCOUNTER — APPOINTMENT (OUTPATIENT)
Dept: CT IMAGING | Facility: HOSPITAL | Age: 81
End: 2021-11-12

## 2021-11-12 ENCOUNTER — HOSPITAL ENCOUNTER (EMERGENCY)
Facility: HOSPITAL | Age: 81
Discharge: NURSING, REHAB OR LONG-TERM CARE | End: 2021-11-12
Attending: EMERGENCY MEDICINE | Admitting: EMERGENCY MEDICINE

## 2021-11-12 VITALS
RESPIRATION RATE: 16 BRPM | OXYGEN SATURATION: 98 % | HEIGHT: 62 IN | BODY MASS INDEX: 20.2 KG/M2 | SYSTOLIC BLOOD PRESSURE: 106 MMHG | HEART RATE: 70 BPM | TEMPERATURE: 97.2 F | DIASTOLIC BLOOD PRESSURE: 78 MMHG

## 2021-11-12 DIAGNOSIS — S09.90XA MINOR HEAD INJURY, INITIAL ENCOUNTER: Primary | ICD-10-CM

## 2021-11-12 PROCEDURE — 70450 CT HEAD/BRAIN W/O DYE: CPT

## 2021-11-12 PROCEDURE — 72125 CT NECK SPINE W/O DYE: CPT

## 2021-11-12 PROCEDURE — 99283 EMERGENCY DEPT VISIT LOW MDM: CPT

## 2021-11-12 NOTE — ED NOTES
Patient presents to ER from [facility], notes she was reaching over to grab something that fell on the floor and when she did she fell off the bed and hit her head.  Denies any LOC, no blood thinners on medication list sent by facility.  JUAN JOSÉ present.  Patient is alert and oriented x2, per patient that is normal for her.  ABC's intact.  VSS.  No deformities noted to head.  NAD noted.  Patient transferred from wheelchair to chair with assist x1, stand pivot sit.  Patient wearing mask, nurse wearing mask, n95, protective eyewear for care and assessment.  Hand hygiene performed prior to and post care.      Christopher Gonzales RN  11/12/21 2265

## 2021-11-12 NOTE — ED NOTES
Pt from [facility], pt was reaching for something, fell onto floor, + head injury. No blood thinners, denies LOC.     Denies neck, back pain. normally ambulates with walker.     Pt placed in mask at triage, this staff member wearing appropriate ppe        Elise Obrien, RN  11/12/21 0512

## 2021-11-12 NOTE — ED PROVIDER NOTES
EMERGENCY DEPARTMENT ENCOUNTER    Room Number:  A02/02  Date of encounter:  11/12/2021  PCP: Shantel Michelle MD  Historian: Son and EMS      PPE    Patient was placed in face mask in first look. Patient was wearing facemask when I entered the room and throughout our encounter. I wore full protective equipment throughout this patient encounter including a CAPR face mask, and gloves. Hand hygiene was performed before donning protective equipment and after removal when leaving the room.        HPI:  Chief Complaint: Fall  A complete HPI/ROS/PMH/PSH/SH/FH are unobtainable due to: History of dementia    Context: Dory Hampton is a 81 y.o. female who arrives to the ED via EMS from [facility].  Patient presents after a unwitnessed fall.  Patient gives the story that she was going between her bed and desk when she lost her balance and fell.  However son called and stated that the nursing facility called and said that patient had been sitting in her wheelchair and they assume that she fell forward out of the wheelchair.  Patient is pleasantly confused therefore unable to provide full HPI or review of symptoms.        PAST MEDICAL HISTORY  Active Ambulatory Problems     Diagnosis Date Noted   • Essential hypertension    • Hyperlipidemia    • Hypothyroidism, adult    • Iron deficiency anemia due to chronic blood loss 08/08/2016   • Macrocytic anemia 08/08/2016   • AVM (arteriovenous malformation) of small bowel, acquired with hemorrhage 11/21/2016   • S/P AVR 01/18/2017   • Paroxysmal atrial fibrillation (HCC) 12/02/2019   • Sinus bradycardia 12/01/2020   • Bilateral carotid artery stenosis 12/01/2020   • Generalized weakness 10/29/2021   • Dementia (HCC) 10/29/2021   • Stage 3 chronic kidney disease (HCC) 10/29/2021   • Lung nodule 10/30/2021   • CAD (coronary artery disease) 10/30/2021   • Anemia, chronic disease 10/30/2021   • Abnormal LFTs 10/30/2021     Resolved Ambulatory Problems     Diagnosis Date Noted   •  Aortic stenosis    • Thoracic aorta atherosclerosis ([facility])    • Hemolytic anemia, mechanical ([facility]) 08/08/2016   • Dyspnea on effort 08/11/2016   • Aortic valve stenosis 12/16/2016   • Cognitive change 12/02/2019   • OJSÉ LUIS (acute kidney injury) ([facility]) 10/29/2021   • Leukocytosis 10/30/2021     Past Medical History:   Diagnosis Date   • Abnormal electrocardiogram    • Anemia    • Asthma    • Bradycardia    • Coronary artery disease    • Diastolic dysfunction    • Environmental allergies    • Female stress incontinence    • History of diverticulosis    • History of transfusion    • Hypertension    • Hypokalemia    • Hypothyroid    • Iron deficiency anemia    • Left ventricular hypertrophy    • Murmur    • PAF (paroxysmal atrial fibrillation) ([facility])    • Pleural effusion    • Psoriasis    • Tendinitis          PAST SURGICAL HISTORY  Past Surgical History:   Procedure Laterality Date   • AORTIC VALVE REPAIR/REPLACEMENT N/A 12/16/2016    Procedure: BHARGAVI MINI STERNOTOMY AORTIC VALVE REPLACEMENT;  Surgeon: Robin Jones MD;  Location: [facility] MAIN OR;  Service:    • APPENDECTOMY  06/1972   • CAPSULE ENDOSCOPY  2015   • CARDIAC CATHETERIZATION N/A 8/25/2016    Procedure: Coronary angiography;  Surgeon: Lulú Hooper MD;  Location: [facility] CATH INVASIVE LOCATION;  Service:    • CARDIAC CATHETERIZATION N/A 8/25/2016    Procedure: Right Heart Cath;  Surgeon: Lulú Hooper MD;  Location: [facility] CATH INVASIVE LOCATION;  Service:    • CATARACT EXTRACTION Bilateral     Left 12/10/13, Right 12/17/2013   • CATARACT EXTRACTION     • COLONOSCOPY  06/22/2015    Dr. Mohamud   • DENTAL PROCEDURE  2011    Implants   • ENDOSCOPY  06/22/2015    Dr. Mohamud   • ENTEROSCOPY SMALL BOWEL N/A 11/21/2016    Procedure: ENTEROSCOPY SMALL BOWEL WITH APC CAUTERY;  Surgeon: Tj Torres MD;  Location: [facility] ENDOSCOPY;  Service:    • PELVIC LAPAROSCOPY     • STERNOTOMY      mini   • TONSILLECTOMY     • TUBAL ABDOMINAL LIGATION           FAMILY  HISTORY  Family History   Problem Relation Age of Onset   • Endometriosis Mother    • Dementia Mother    • Hypertension Mother    • Uterine cancer Mother    • Alzheimer's disease Mother    • Tongue cancer Father    • Heart failure Maternal Grandmother    • Heart attack Maternal Grandmother    • Stroke Maternal Grandfather    • Heart disease Maternal Grandfather    • Hypertension Maternal Aunt    • Hypertension Maternal Uncle          SOCIAL HISTORY  Social History     Socioeconomic History   • Marital status:      Spouse name: Kody   • Years of education: College   Tobacco Use   • Smoking status: Former Smoker     Packs/day: 1.00     Years: 20.00     Pack years: 20.00     Types: Cigarettes     Quit date:      Years since quittin.8   • Smokeless tobacco: Never Used   Substance and Sexual Activity   • Alcohol use: Yes     Comment: Occ///caffeine use: 1 cup daily   • Drug use: No   • Sexual activity: Defer         ALLERGIES  Eggs or egg-derived products, Formaldehyde, Other, Penicillins, and Sulfa antibiotics        REVIEW OF SYSTEMS  Review of Systems     Limited secondary to patient has a history of dementia       PHYSICAL EXAM    ED Triage Vitals [21 1442]   Temp Heart Rate Resp BP SpO2   97.2 °F (36.2 °C) 70 16 106/78 98 %       Physical Exam  GENERAL: Well appearing, nontoxic appearing, not distressed  HENT: normocephalic, atraumatic  EYES: no scleral icterus, PERRL,EOMI  CV: regular rhythm, regular rate, no murmur  RESPIRATORY: normal effort, CTAB  ABDOMEN: soft   MUSCULOSKELETAL: no deformity  No cervical, thoracic or lumbar vertebral tenderness to palpation  No step off or crepitus noted  No cervical, thoracic or lumbar paraspinal tenderness to palpation  NEURO: alert, oriented to person only, she is unable to provide date of birth, year or where she is, moves all extremities, follows commands, mental status normal/baseline  SKIN: warm, dry, no rash   Psych: Pleasantly confused  Nursing  notes and vital signs reviewed      LAB RESULTS  No results found for this or any previous visit (from the past 24 hour(s)).    Ordered the above labs and independently reviewed the results.      RADIOLOGY  CT Head Without Contrast    Result Date: 11/12/2021  CT HEAD WITHOUT CONTRAST  HISTORY: Fall, head injury.  COMPARISON: Comparison is made to multiple prior CT examinations with the most recent examination being the administration 11/03/2021.  FINDINGS: There is age-appropriate atrophy. Moderate vascular calcification is noted. Areas of decreased attenuation involving the white matter of the cerebral hemispheres are noted bilaterally, nonspecific but consistent with mild-to-moderate small vessel ischemic disease. No focal area of decreased attenuation to suggest acute infarction is identified. Bone windows show no evidence of a calvarial fracture.      No evidence of fracture or hemorrhage.  The above information was called to and discussed with Dr. Wright.    Radiation dose reduction techniques were utilized, including automated exposure control and exposure modulation based on body size.  This report was finalized on 11/12/2021 4:11 PM by Dr. Omi Reynoso M.D.      CT Cervical Spine Without Contrast    Result Date: 11/12/2021  CT of the cervical spine without contrast 11/12/2021  HISTORY: Fell. Possible neck injury. Neck pain.  Axial images were obtained from the skull base to the upper thoracic spine. Sagittal and coronal reconstruction images were reviewed.  There is mild degenerative disease of the C1-C2 articulation. There is minimal (2 mm) anterolisthesis of C3 on C4. There is mild C3-4 disc space narrowing. There is mild to moderate spondylosis of C4-5 and C5-6 with minimal retrolisthesis of C5 on C6.  There is multilevel facet joint arthropathy. There is bilateral carotid calcification. There is slight reversal of the cervical lordosis.  No other subluxation is seen.  No cervical spine fractures are  seen.      1. Cervical degenerative disease. 2. No acute bony abnormality is seen.  Radiation dose reduction techniques were utilized, including automated exposure control and exposure modulation based on body size.  This report was finalized on 11/12/2021 7:01 PM by Dr. Rayshawn Navarro M.D.        I ordered the above noted radiological studies and viewed the images on the PACS system.         MEDICAL RECORD REVIEW  Medical records reviewed in epic, patient was just discharged from the hospital on November 4, 2021 after being admitted for generalized weakness, lung nodule      PROCEDURES    Procedures        DIFFERENTIAL DIAGNOSIS  Differential Diagnosis for head injury include but are not limited to the following:  Cerebral contusion, Concussion ( with or without LOC), Head contusion, Skull fracture, orbital fracture, Hematoma, Intracranial Hemorrhage, Laceration, Post Concussion Syndrome.         PROGRESS, DATA ANALYSIS, CONSULTS, AND MEDICAL DECISION MAKING        ED Course as of 11/12/21 1953 Fri Nov 12, 2021   1659 Patient son called checking on her, he states that patient does have a history of dementia and she is normally pleasantly confused.  He states he did receive a call from the nursing home stating that she was in her wheelchair when they left and when they came back she had fallen out of it.  Patient's head CT is unremarkable, will obtain a CT of the cervical spine to rule out occult fracture since the fall was unwitnessed and patient cannot provide any details of the fall. [MS]   1701 Patient has ambulated to the bathroom, she will be placed in a room with bed alarm. [MS]   1707 Reviewed pt's history and workup with Dr. Gay.  After a bedside evaluation, they agree with the plan of care.       [MS]   1945   Imaging:  CT head: No fracture or hemorrhage  CT C-spine: No fracture seen    Given history, exam and work-up low suspicious for intracranial hemorrhage, skull fracture, spine fracture or  other acute spinal injuries, or extremity fracture.  Updated patient and daughter on imaging results.  Patient will be discharged back to nursing home with her daughter taking her with strict return to ER precautions and instructions to follow-up with PMD as needed.     [MS]      ED Course User Index  [MS] Anna Barrera APRN     Discussed plan for discharge, as there is no emergent indication for admission. Pt/family is agreeable and understands need for follow up and repeat testing.  Pt is aware that discharge does not mean that nothing is wrong but it indicates no emergency is present that requires admission and they must continue care with follow-up as given below or physician of their choice.   Patient/Family voiced understanding of above instructions.  Patient discharged in stable condition.    DIAGNOSIS  Final diagnoses:   Minor head injury, initial encounter       FOLLOW UP   Shantel Michelle MD  [address] [place] [address]  [phone]    Call in 3 days        RX     Medication List      No changes were made to your prescriptions during this visit.           MEDICATIONS GIVEN IN ED    Medications - No data to display        COURSE & MEDICAL DECISION MAKING  Any/All labs and Any/All Imaging studies that were ordered were reviewed and are noted above.  Results were reviewed/discussed with the patient and they were also made aware of online access.    Pt also made aware that some labs, such as cultures, will not be resulted during ER visit and follow up with PMD is necessary.        Anna Barrera APRN  11/12/21 1953

## 2021-11-12 NOTE — ED PROVIDER NOTES
Pt presents to the ED c/o  unwitnessed fall at her nursing home.  Patient has a history of dense dementia which makes her history limited.  Patient denies headache, neck pain or back pain.     On exam,   Her heart is regular rate and rhythm without murmurs.  Her lungs are clear to auscultation bilaterally.  Her scalp is nontender to palpation.  Her C-spine is nontender to palpation with full range of motion.  Patient is ambulatory in the hallway and denies pain.     Plan: I agree with plan of CT of the head and neck.  I suspect that it will be negative.      Patient was placed in face mask in first look. Patient was wearing facemask when I entered the room and throughout our encounter. I wore full protective equipment throughout this patient encounter including a face mask, eye shield and gloves. Hand hygiene was performed before donning protective equipment and after removal when leaving the room.       Attestation:  The JEROME and I have discussed this patient's history, physical exam, and treatment plan.  I have reviewed the documentation and personally had a face to face interaction with the patient. I affirm the documentation and agree with the treatment and plan.  The attached note describes my personal findings.            Bandar Gay MD  11/12/21 7575

## 2021-11-12 NOTE — ED NOTES
After seeing pt, this RN determined that she is not appropriate to sitting in Pod S d/t fall risk and dementia. Pt will be placed in [address] 2 on a bed alarm for her safety.     Son Toñito  (POA) called for update ([phone]).      Yoselin Dsouza RN  11/12/21 1194       Yoselin Dsouza RN  11/12/21 6031

## 2021-11-19 ENCOUNTER — READMISSION MANAGEMENT (OUTPATIENT)
Dept: CALL CENTER | Facility: HOSPITAL | Age: 81
End: 2021-11-19

## 2021-11-19 NOTE — OUTREACH NOTE
Prep Survey      Responses   [facility] facility patient discharged from? Non-BH   Is LACE score < 7 ? Non-BH Discharge   Emergency Room discharge w/ pulse ox? No   Eligibility [facility]   Date of Discharge 11/19/21   Discharge diagnosis Unavailable   Does the patient have one of the following disease processes/diagnoses(primary or secondary)? Other   Prep survey completed? Yes          Violetta Candelario RN

## 2021-11-22 ENCOUNTER — TRANSITIONAL CARE MANAGEMENT TELEPHONE ENCOUNTER (OUTPATIENT)
Dept: CALL CENTER | Facility: HOSPITAL | Age: 81
End: 2021-11-22

## 2021-11-22 NOTE — OUTREACH NOTE
Call Center TCM Note      Responses   [facility] patient discharged from? Non-BH   Does the patient have one of the following disease processes/diagnoses(primary or secondary)? Other   TCM attempt successful? No   Unsuccessful attempts Attempt 2          Fina Rosario RN    11/22/2021, 12:47 EST

## 2021-11-22 NOTE — OUTREACH NOTE
Call Center TCM Note      Responses   [facility] patient discharged from? Non-   Does the patient have one of the following disease processes/diagnoses(primary or secondary)? Other   TCM attempt successful? No   Unsuccessful attempts Attempt 1          Fina Rosario RN    11/22/2021, 09:50 EST

## 2021-11-23 ENCOUNTER — TRANSITIONAL CARE MANAGEMENT TELEPHONE ENCOUNTER (OUTPATIENT)
Dept: CALL CENTER | Facility: HOSPITAL | Age: 81
End: 2021-11-23

## 2021-11-23 NOTE — OUTREACH NOTE
Call Center TCM Note      Responses   [facility] patient discharged from? Non-   Does the patient have one of the following disease processes/diagnoses(primary or secondary)? Other   TCM attempt successful? No   Unsuccessful attempts Attempt 3          Fina Rosario RN    11/23/2021, 12:37 EST

## 2021-11-29 ENCOUNTER — APPOINTMENT (OUTPATIENT)
Dept: GENERAL RADIOLOGY | Facility: HOSPITAL | Age: 81
End: 2021-11-29

## 2021-11-29 ENCOUNTER — HOSPITAL ENCOUNTER (EMERGENCY)
Facility: HOSPITAL | Age: 81
Discharge: NURSING, REHAB OR LONG-TERM CARE | End: 2021-11-29
Attending: EMERGENCY MEDICINE | Admitting: EMERGENCY MEDICINE

## 2021-11-29 ENCOUNTER — APPOINTMENT (OUTPATIENT)
Dept: CT IMAGING | Facility: HOSPITAL | Age: 81
End: 2021-11-29

## 2021-11-29 VITALS
HEIGHT: 61 IN | TEMPERATURE: 97.2 F | DIASTOLIC BLOOD PRESSURE: 66 MMHG | BODY MASS INDEX: 22.09 KG/M2 | SYSTOLIC BLOOD PRESSURE: 156 MMHG | OXYGEN SATURATION: 97 % | RESPIRATION RATE: 16 BRPM | WEIGHT: 117 LBS | HEART RATE: 63 BPM

## 2021-11-29 DIAGNOSIS — S50.311A ABRASION OF RIGHT ELBOW, INITIAL ENCOUNTER: ICD-10-CM

## 2021-11-29 DIAGNOSIS — S70.01XA CONTUSION OF RIGHT HIP, INITIAL ENCOUNTER: ICD-10-CM

## 2021-11-29 DIAGNOSIS — Z86.59 HISTORY OF DEMENTIA: ICD-10-CM
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
## 2021-11-29 DIAGNOSIS — E03.9 HYPOTHYROIDISM, UNSPECIFIED TYPE: ICD-10-CM

## 2021-11-29 PROCEDURE — 70450 CT HEAD/BRAIN W/O DYE: CPT

## 2021-11-29 PROCEDURE — 99283 EMERGENCY DEPT VISIT LOW MDM: CPT

## 2021-11-29 PROCEDURE — 73502 X-RAY EXAM HIP UNI 2-3 VIEWS: CPT

## 2021-11-30 ENCOUNTER — TELEPHONE (OUTPATIENT)
Dept: ONCOLOGY | Facility: CLINIC | Age: 81
End: 2021-11-30

## 2021-11-30 NOTE — TELEPHONE ENCOUNTER
Pt fell and missed PET scan appt - per staff msg needs to r/s before appt with Dr. Keys 12/3/21.  rescheduled PET 12/2/21 at 9:45 AM - left msg on pt vm

## 2021-12-02 ENCOUNTER — APPOINTMENT (OUTPATIENT)
Dept: PET IMAGING | Facility: HOSPITAL | Age: 81
End: 2021-12-02

## 2021-12-02 ENCOUNTER — TELEPHONE (OUTPATIENT)
Dept: ONCOLOGY | Facility: CLINIC | Age: 81
End: 2021-12-02

## 2021-12-02 NOTE — TELEPHONE ENCOUNTER
PER STAFF MSG NEED TO CX AND R/S MD VISIT AND PET SCAN  FOR January.  LEFT MSG ON VM WITH DATES AND TIMES AND MAILED OUT TO PT

## 2021-12-02 NOTE — TELEPHONE ENCOUNTER
Phoned patient's daughter to follow up on missed PET scan. Daughter stated that her mother had been in the hospital twice since her admission in late October due to falls. Daughter stated that patient has just been transferred to the memory care unit at [facility], and she is doing somewhat better. Advised daughter that per Dr. Keys, patient can wait to follow up with him in several weeks, after the holidays. Daughter agreeable to this. Also advised daughter to contact us if they decide they do not wish to pursue PET scan for patient. Daughter v/u. Will ask scheduling to take patient off Dr. Keys's schedule tomorrow and to coordinate new date and time with patient's daughter.

## 2021-12-03 ENCOUNTER — APPOINTMENT (OUTPATIENT)
Dept: LAB | Facility: HOSPITAL | Age: 81
End: 2021-12-03

## 2021-12-27 ENCOUNTER — APPOINTMENT (OUTPATIENT)
Dept: CT IMAGING | Facility: HOSPITAL | Age: 81
End: 2021-12-27

## 2021-12-27 ENCOUNTER — APPOINTMENT (OUTPATIENT)
Dept: GENERAL RADIOLOGY | Facility: HOSPITAL | Age: 81
End: 2021-12-27

## 2021-12-27 ENCOUNTER — HOSPITAL ENCOUNTER (EMERGENCY)
Facility: HOSPITAL | Age: 81
Discharge: NURSING, REHAB OR LONG-TERM CARE | End: 2021-12-27
Attending: EMERGENCY MEDICINE | Admitting: EMERGENCY MEDICINE

## 2021-12-27 VITALS
SYSTOLIC BLOOD PRESSURE: 164 MMHG | RESPIRATION RATE: 16 BRPM | DIASTOLIC BLOOD PRESSURE: 66 MMHG | HEART RATE: 63 BPM | OXYGEN SATURATION: 99 % | TEMPERATURE: 98.1 F

## 2021-12-27 DIAGNOSIS — S00.03XA CONTUSION OF SCALP, INITIAL ENCOUNTER: ICD-10-CM
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
## 2021-12-27 DIAGNOSIS — S70.01XA CONTUSION OF RIGHT HIP, INITIAL ENCOUNTER: Primary | ICD-10-CM
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
## 2021-12-27 PROCEDURE — 72125 CT NECK SPINE W/O DYE: CPT

## 2021-12-27 PROCEDURE — 99283 EMERGENCY DEPT VISIT LOW MDM: CPT

## 2021-12-27 PROCEDURE — 73502 X-RAY EXAM HIP UNI 2-3 VIEWS: CPT

## 2021-12-27 PROCEDURE — 70450 CT HEAD/BRAIN W/O DYE: CPT

## 2021-12-27 NOTE — ED NOTES
Pt presents to ED via EMS from [facility]. Staff reports unwitnessed fall. Staff reports they went into see her and she was on the floor. Pt denies any pain at this time. Unsure of LOC, head injury, but no blood thinner use. Pt is A&Ox2, which is baseline secondary to dementia. Pt ambulated with EMS, and in a mask at this time.     Patient was placed in face mask during first look triage.  Patient was wearing a face mask throughout encounter.  I wore personal protective equipment throughout the encounter.  Hand hygiene was performed before and after patient encounter.       David Wolf, RN  12/27/21 7015

## 2021-12-27 NOTE — ED PROVIDER NOTES
EMERGENCY DEPARTMENT ENCOUNTER    Room Number:  02/02  Date seen:  12/27/2021  Time seen: 10:25 EST  PCP: Shantel Michelle MD  Historian: patient, EMS report, nursing home report      HPI:  Chief Complaint: fall, head injury    A complete HPI/ROS/PMH/PSH/SH/FH are unobtainable due to:  dementia    Context: Dory Hampton is a 81 y.o. female who presents to the ED for evaluation of injuries related to a fall at the nursing home this morning.  Patient states she was in the bathroom and fell, is not sure what caused the fall.  She was found by nursing home staff.  She was sent in for further evaluation, is not on any anticoagulants.  Alert and oriented x2 at baseline.  She denies any complaints right now, including any headache nausea vomiting pain in her neck back or extremities.        PAST MEDICAL HISTORY  Active Ambulatory Problems     Diagnosis Date Noted   • Essential hypertension    • Hyperlipidemia    • Hypothyroidism, adult    • Iron deficiency anemia due to chronic blood loss 08/08/2016   • Macrocytic anemia 08/08/2016   • AVM (arteriovenous malformation) of small bowel, acquired with hemorrhage 11/21/2016   • S/P AVR 01/18/2017   • Paroxysmal atrial fibrillation ([facility]) 12/02/2019   • Sinus bradycardia 12/01/2020   • Bilateral carotid artery stenosis 12/01/2020   • Generalized weakness 10/29/2021   • Dementia ([facility]) 10/29/2021   • Stage 3 chronic kidney disease ([facility]) 10/29/2021   • Lung nodule 10/30/2021   • CAD (coronary artery disease) 10/30/2021   • Anemia, chronic disease 10/30/2021   • Abnormal LFTs 10/30/2021     Resolved Ambulatory Problems     Diagnosis Date Noted   • Aortic stenosis    • Thoracic aorta atherosclerosis ([facility])    • Hemolytic anemia, mechanical ([facility]) 08/08/2016   • Dyspnea on effort 08/11/2016   • Aortic valve stenosis 12/16/2016   • Cognitive change 12/02/2019   • JOSÉ LUIS (acute kidney injury) ([facility]) 10/29/2021   • Leukocytosis 10/30/2021     Past Medical History:   Diagnosis Date   •  Abnormal electrocardiogram    • Anemia    • Asthma    • Bradycardia    • Coronary artery disease    • Diastolic dysfunction    • Environmental allergies    • Female stress incontinence    • History of diverticulosis    • History of transfusion    • Hypertension    • Hypokalemia    • Hypothyroid    • Iron deficiency anemia    • Left ventricular hypertrophy    • Murmur    • PAF (paroxysmal atrial fibrillation) (H[facility])    • Pleural effusion    • Psoriasis    • Tendinitis          PAST SURGICAL HISTORY  Past Surgical History:   Procedure Laterality Date   • AORTIC VALVE REPAIR/REPLACEMENT N/A 12/16/2016    Procedure: BHARGAVI MINI STERNOTOMY AORTIC VALVE REPLACEMENT;  Surgeon: Robin Jones MD;  Location: [facility] MAIN OR;  Service:    • APPENDECTOMY  06/1972   • CAPSULE ENDOSCOPY  2015   • CARDIAC CATHETERIZATION N/A 8/25/2016    Procedure: Coronary angiography;  Surgeon: Lulú Hooper MD;  Location:  [facility] CATH INVASIVE LOCATION;  Service:    • CARDIAC CATHETERIZATION N/A 8/25/2016    Procedure: Right Heart Cath;  Surgeon: Lulú Hooper MD;  Location:  [facility]ASIVE [facility];  Service:    • CATARACT EXTRACTION Bilateral     Left 12/10/13, Right 12/17/2013   • CATARACT EXTRACTION     • COLONOSCOPY  06/22/2015    Dr. Mohamud   • DENTAL PROCEDURE  2011    Implants   • ENDOSCOPY  06/22/2015    Dr. Mohamud   • ENTEROSCOPY SMALL BOWEL N/A 11/21/2016    Procedure: ENTEROSCOPY SMALL BOWEL WITH APC CAUTERY;  Surgeon: Tj Torres MD;  Location: [facility] ENDOSCOPY;  Service:    • PELVIC LAPAROSCOPY     • STERNOTOMY      mini   • TONSILLECTOMY     • TUBAL ABDOMINAL LIGATION           FAMILY HISTORY  Family History   Problem Relation Age of Onset   • Endometriosis Mother    • Dementia Mother    • Hypertension Mother    • Uterine cancer Mother    • Alzheimer's disease Mother    • Tongue cancer Father    • Heart failure Maternal Grandmother    • Heart attack Maternal Grandmother    • Stroke Maternal Grandfather    • Heart  disease Maternal Grandfather    • Hypertension Maternal Aunt    • Hypertension Maternal Uncle          SOCIAL HISTORY  Social History     Socioeconomic History   • Marital status:      Spouse name: Kody   • Years of education: College   Tobacco Use   • Smoking status: Former Smoker     Packs/day: 1.00     Years: 20.00     Pack years: 20.00     Types: Cigarettes     Quit date:      Years since quittin.9   • Smokeless tobacco: Never Used   Substance and Sexual Activity   • Alcohol use: Yes     Comment: Occ///caffeine use: 1 cup daily   • Drug use: No   • Sexual activity: Defer         ALLERGIES  Eggs or egg-derived products, Formaldehyde, Other, Penicillins, and Sulfa antibiotics        REVIEW OF SYSTEMS  Review of Systems   Unable to perform ROS: Dementia          PHYSICAL EXAM  ED Triage Vitals [21 0822]   Temp Heart Rate Resp BP SpO2   98.1 °F (36.7 °C) 71 18 155/67 99 %      Temp src Heart Rate Source Patient Position BP Location FiO2 (%)   Tympanic Monitor Sitting Right arm --         GENERAL: not distressed  HENT: atraumatic, normocephalic  EYES: no scleral icterus  CV: regular rhythm, regular rate  RESPIRATORY: normal effort CTA B   ABDOMEN: soft, nontender nondistended  MUSCULOSKELETAL: no deformity, atraumatic extremities with age-appropriate range of motion.  No focal tenderness.  No C, T, L-spine tenderness  NEURO: alert, moves all extremities, follows commands  SKIN: warm, dry    Vital signs and nursing notes reviewed.          RADIOLOGY  XR Hip With or Without Pelvis 2 - 3 View Right   Final Result   FINDINGS AND IMPRESSION:   Severe right hip osteoarthritis present with superolateral migration of   the right femoral head, as before. Linear lucency projects through the   right inferior pubic ramus, grossly unchanged since 2021. Given   the stability without findings of interval healing, findings likely   correspond with the punctate areas of cortical thinning in this    locations on CT. A fracture is less likely.       Generalized bony demineralization is present. While no displaced pelvic   or right hip fracture is seen, please note nondisplaced hip fractures   can remain radiographically occult and if there is continued clinical   concern for right hip injury, right hip MRI is recommended to further   evaluate.       This report was finalized on 12/27/2021 11:07 AM by Dr. Nathaniel Starr M.D.          CT Head Without Contrast   Preliminary Result   No evidence of fracture or hemorrhage.       CT EXAMINATION OF THE CERVICAL SPINE WITHOUT CONTRAST:       There is reversal of the normal cervical lordosis. There is moderate   loss of disc height at C4-5 and C5-C6. There is no evidence of   prevertebral edema or fracture. The facets at C3-4 to the right are   fused.       C2-3: Moderate facet degenerative disease is present on the left.       C3-4: A mild central disc osteophyte complex is present with no evidence   of herniation.       C4-5: A central disc osteophyte complex is present which is more   prominent to the right. Mild foraminal stenosis is present bilaterally.       C5-6: A mild central disc osteophyte complex is present with no evidence   of herniation. Mild and mild-to-moderate foraminal stenosis is present   on the left and right, respectively, secondary to uncovertebral   degenerative disease.       C6-7: Mild foraminal stenosis is present on the left secondary to   moderate facet degenerative disease.       C7-T1: There is no evidence of disc bulge or herniation.       IMPRESSION: Multilevel degenerative disease as described with no   evidence of fracture.                       Radiation dose reduction techniques were utilized, including automated   exposure control and exposure modulation based on body size.              CT Cervical Spine Without Contrast   Preliminary Result   No evidence of fracture or hemorrhage.       CT EXAMINATION OF THE CERVICAL SPINE  WITHOUT CONTRAST:       There is reversal of the normal cervical lordosis. There is moderate   loss of disc height at C4-5 and C5-C6. There is no evidence of   prevertebral edema or fracture. The facets at C3-4 to the right are   fused.       C2-3: Moderate facet degenerative disease is present on the left.       C3-4: A mild central disc osteophyte complex is present with no evidence   of herniation.       C4-5: A central disc osteophyte complex is present which is more   prominent to the right. Mild foraminal stenosis is present bilaterally.       C5-6: A mild central disc osteophyte complex is present with no evidence   of herniation. Mild and mild-to-moderate foraminal stenosis is present   on the left and right, respectively, secondary to uncovertebral   degenerative disease.       C6-7: Mild foraminal stenosis is present on the left secondary to   moderate facet degenerative disease.       C7-T1: There is no evidence of disc bulge or herniation.       IMPRESSION: Multilevel degenerative disease as described with no   evidence of fracture.                       Radiation dose reduction techniques were utilized, including automated   exposure control and exposure modulation based on body size.                  I ordered the above noted radiological studies. Reviewed by me and discussed with radiologist.  See dictation for official radiology interpretation.    PROCEDURES  Procedures        MEDICATIONS GIVEN IN ER  Medications - No data to display          PROGRESS AND CONSULTS    DDX includes but not limited to skull fracture, brain bleed, scalp contusion    ED Course as of 12/27/21 1319   Mon Dec 27, 2021   1017 I discussed the patient with her RN at [facility].  She states that the patient was found on the floor in her bathroom.  She had been fairly recently checked on, maximum amount of time she could have been down was 1 hour.  He is at her mental baseline.  Had complaints of hitting her head and some  right hip pain and was sent in for further evaluation.She is not anticoagulated. [KA]   1317 Discussed the patient, daughters at bedside.  She is able to stand and weight-bear.  She has a walker and a wheelchair at the nursing home.  She is not supposed to do any independent walking and had walked to the bathroom independently which contributed to her fall.  Imaging unremarkable, she stable for discharge.  Daughter at the bedside is agreeable and plans to drive her back to [facility]. [KA]   1318 My interpretation of the right hip x-rays is no acute fractures, there are significant degenerative changes. [KA]      ED Course User Index  [KA] Sanjuanita Mandel PA             Patient was placed in face mask in first look. Patient was wearing facemask each time I entered the room and throughout our encounter. I wore protective equipment throughout this patient encounter including a face mask, eye shield and gloves. Hand hygiene was performed before donning protective equipment and after removal when leaving the room.        DIAGNOSIS  Final diagnoses:   Contusion of right hip, initial encounter   Contusion of scalp, initial encounter   Fall at nursing home, initial encounter         Follow Up:  Shantel Michelle MD  [address] [address]  [phone]    In 1 week  As needed      RX:     Medication List      No changes were made to your prescriptions during this visit.           Latest Documented Vital Signs:  As of 13:19 EST  BP- 166/76 HR- 66 Temp- 98.1 °F (36.7 °C) (Tympanic) O2 sat- 98%       Sanjuanita Mandel PA  12/27/21 [id number]

## 2021-12-27 NOTE — ED PROVIDER NOTES
Brief history of present illness: 81-year-old female with dementia who lives at a dementia care facility was found down in her bathroom approximately 1 hour after last normal check. Patient has no complaints for me at this time.    Physical exam:     ED Triage Vitals [12/27/21 0822]   Temp Heart Rate Resp BP SpO2   98.1 °F (36.7 °C) 71 18 155/67 99 %      Temp src Heart Rate Source Patient Position BP Location FiO2 (%)   Tympanic Monitor Sitting Right arm --     Pleasantly confused. Moving all extremities equally but rather weakly. No respiratory distress or tachypnea. Atraumatic chest wall and abdomen. Atraumatic calvarial exam. Painless range of motion of the neck without focal tenderness to palpation. Pleasant mood.    MDM:  CT Head Without Contrast, CT Cervical Spine Without Contrast    Result Date: 12/27/2021  Narrative: CT HEAD WITHOUT CONTRAST AND CT CERVICAL SPINE WITHOUT CONTRAST  HISTORY: Facial trauma. Neck pain.  COMPARISON: CT head 11/29/2021 and CT cervical spine 11/12/2021.  CT HEAD WITHOUT CONTRAST:  FINDINGS: There is age-appropriate atrophy. There is no evidence of hemorrhage, hydrocephalus or of abnormal extra-axial fluid. No focal area of decreased attenuation to suggest acute infarction is identified. Mild to moderate vascular calcification is noted. Bone windows showed no evidence of a calvarial fracture.      Impression: No evidence of fracture or hemorrhage.  CT EXAMINATION OF THE CERVICAL SPINE WITHOUT CONTRAST:  There is reversal of the normal cervical lordosis. There is moderate loss of disc height at C4-5 and C5-C6. There is no evidence of prevertebral edema or fracture. The facets at C3-4 to the right are fused.  C2-3: Moderate facet degenerative disease is present on the left.  C3-4: A mild central disc osteophyte complex is present with no evidence of herniation.  C4-5: A central disc osteophyte complex is present which is more prominent to the right. Mild foraminal stenosis is present  bilaterally.  C5-6: A mild central disc osteophyte complex is present with no evidence of herniation. Mild and mild-to-moderate foraminal stenosis is present on the left and right, respectively, secondary to uncovertebral degenerative disease.  C6-7: Mild foraminal stenosis is present on the left secondary to moderate facet degenerative disease.  C7-T1: There is no evidence of disc bulge or herniation.  IMPRESSION: Multilevel degenerative disease as described with no evidence of fracture.      Radiation dose reduction techniques were utilized, including automated exposure control and exposure modulation based on body size.       CT Head Without Contrast    Result Date: 11/29/2021  Narrative: CT HEAD WITHOUT CONTRAST  HISTORY: Unwitnessed fall  COMPARISON: 11/12/2021  TECHNIQUE: Axial CT imaging was obtained through the brain. No IV contrast was administered.  FINDINGS: No acute intracranial hemorrhage is seen. There is diffuse atrophy. Degree of ventricular dilatation may be somewhat out of proportion to the degree of atrophy, which can be seen in the setting of normal pressure hydrocephalus. There is periventricular and deep white matter microangiopathic change. There is no midline shift or mass effect. Bilateral basal ganglia calcifications are noted. No calvarial fracture is seen. Paranasal sinuses and mastoid air cells appear clear.      Impression: No acute intracranial findings.  Radiation dose reduction techniques were utilized, including automated exposure control and exposure modulation based on body size.  This report was finalized on 11/29/2021 2:52 AM by Dr. Lakisha Gandara M.D.      XR Hip With or Without Pelvis 2 - 3 View Right    Result Date: 12/27/2021  Narrative: Pelvis and right hip radiograph  HISTORY:Fall  TECHNIQUE: AP pelvis and AP and lateral radiograph the right hip  COMPARISON:Pelvis and right hip radiographs 11/29/2021 and CT abdomen and pelvis 10/29/2021      Impression: FINDINGS AND  IMPRESSION: Severe right hip osteoarthritis present with superolateral migration of the right femoral head, as before. Linear lucency projects through the right inferior pubic ramus, grossly unchanged since 11/29/2021. Given the stability without findings of interval healing, findings likely correspond with the punctate areas of cortical thinning in this locations on CT. A fracture is less likely.  Generalized bony demineralization is present. While no displaced pelvic or right hip fracture is seen, please note nondisplaced hip fractures can remain radiographically occult and if there is continued clinical concern for right hip injury, right hip MRI is recommended to further evaluate.  This report was finalized on 12/27/2021 11:07 AM by Dr. Nathaniel Starr M.D.      XR Hip With or Without Pelvis 2 - 3 View Right    Result Date: 11/29/2021  Narrative: AP VIEW THE PELVIS +2 VIEWS RIGHT HIP  HISTORY: Pain after a fall  COMPARISON: None available.  FINDINGS: No definite acute fracture or subluxation of the right hip is seen. The patient has very advanced degenerative changes of the right hip. Additional degenerative changes are seen involving the left hip, although to a lesser extent. No definite fracture of the bony pelvis is seen. There is some irregularity of the inferior pubic ramus, but this is felt to be a stable finding when compared to the CT from 10/29/2021. Dense vascular calcifications are noted.      Impression: No acute fracture or subluxation identified.  This report was finalized on 11/29/2021 2:49 AM by Dr. Lakisha Gandaar M.D.      Agree with disposition planning assuming the patient can ambulate.    I have seen and personally evaluated this patient, discussed the case with the treating advanced practice provider, and reviewed their note. I was involved in the medical decision making during the evaluation, testing and disposition planning for this patient.     Mukesh Heath MD  12/27/21 1114

## 2022-01-02 ENCOUNTER — APPOINTMENT (OUTPATIENT)
Dept: GENERAL RADIOLOGY | Facility: HOSPITAL | Age: 82
End: 2022-01-02

## 2022-01-02 ENCOUNTER — APPOINTMENT (OUTPATIENT)
Dept: CT IMAGING | Facility: HOSPITAL | Age: 82
End: 2022-01-02

## 2022-01-02 ENCOUNTER — HOSPITAL ENCOUNTER (EMERGENCY)
Facility: HOSPITAL | Age: 82
Discharge: NURSING, REHAB OR LONG-TERM CARE | End: 2022-01-02
Attending: EMERGENCY MEDICINE | Admitting: EMERGENCY MEDICINE

## 2022-01-02 VITALS
TEMPERATURE: 98.6 F | HEART RATE: 70 BPM | SYSTOLIC BLOOD PRESSURE: 142 MMHG | RESPIRATION RATE: 16 BRPM | WEIGHT: 120 LBS | BODY MASS INDEX: 22.08 KG/M2 | DIASTOLIC BLOOD PRESSURE: 63 MMHG | OXYGEN SATURATION: 98 % | HEIGHT: 62 IN

## 2022-01-02 DIAGNOSIS — N30.01 ACUTE CYSTITIS WITH HEMATURIA: Primary | ICD-10-CM
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
## 2022-01-02 LAB
ALBUMIN SERPL-MCNC: 4 G/DL (ref 3.5–5.2)
ALBUMIN/GLOB SERPL: 1.7 G/DL
ALP SERPL-CCNC: 99 U/L (ref 39–117)
ALT SERPL W P-5'-P-CCNC: 9 U/L (ref 1–33)
ANION GAP SERPL CALCULATED.3IONS-SCNC: 8.6 MMOL/L (ref 5–15)
AST SERPL-CCNC: 32 U/L (ref 1–32)
BACTERIA UR QL AUTO: ABNORMAL /HPF
BASOPHILS # BLD AUTO: 0.04 10*3/MM3 (ref 0–0.2)
BASOPHILS NFR BLD AUTO: 0.4 % (ref 0–1.5)
BILIRUB SERPL-MCNC: 0.4 MG/DL (ref 0–1.2)
BILIRUB UR QL STRIP: NEGATIVE
BUN SERPL-MCNC: 18 MG/DL (ref 8–23)
BUN/CREAT SERPL: 18.8 (ref 7–25)
CALCIUM SPEC-SCNC: 9.1 MG/DL (ref 8.6–10.5)
CHLORIDE SERPL-SCNC: 106 MMOL/L (ref 98–107)
CLARITY UR: CLEAR
CO2 SERPL-SCNC: 27.4 MMOL/L (ref 22–29)
COLOR UR: YELLOW
CREAT SERPL-MCNC: 0.96 MG/DL (ref 0.57–1)
DEPRECATED RDW RBC AUTO: 59.9 FL (ref 37–54)
EOSINOPHIL # BLD AUTO: 0.01 10*3/MM3 (ref 0–0.4)
EOSINOPHIL NFR BLD AUTO: 0.1 % (ref 0.3–6.2)
ERYTHROCYTE [DISTWIDTH] IN BLOOD BY AUTOMATED COUNT: 16 % (ref 12.3–15.4)
GFR SERPL CREATININE-BSD FRML MDRD: 56 ML/MIN/1.73
GLOBULIN UR ELPH-MCNC: 2.4 GM/DL
GLUCOSE SERPL-MCNC: 98 MG/DL (ref 65–99)
GLUCOSE UR STRIP-MCNC: NEGATIVE MG/DL
HCT VFR BLD AUTO: 32.6 % (ref 34–46.6)
HGB BLD-MCNC: 10.8 G/DL (ref 12–15.9)
HGB UR QL STRIP.AUTO: ABNORMAL
HYALINE CASTS UR QL AUTO: ABNORMAL /LPF
IMM GRANULOCYTES # BLD AUTO: 0.03 10*3/MM3 (ref 0–0.05)
IMM GRANULOCYTES NFR BLD AUTO: 0.3 % (ref 0–0.5)
KETONES UR QL STRIP: NEGATIVE
LEUKOCYTE ESTERASE UR QL STRIP.AUTO: ABNORMAL
LYMPHOCYTES # BLD AUTO: 1.03 10*3/MM3 (ref 0.7–3.1)
LYMPHOCYTES NFR BLD AUTO: 10.8 % (ref 19.6–45.3)
MCH RBC QN AUTO: 33.3 PG (ref 26.6–33)
MCHC RBC AUTO-ENTMCNC: 33.1 G/DL (ref 31.5–35.7)
MCV RBC AUTO: 100.6 FL (ref 79–97)
MONOCYTES # BLD AUTO: 0.43 10*3/MM3 (ref 0.1–0.9)
MONOCYTES NFR BLD AUTO: 4.5 % (ref 5–12)
NEUTROPHILS NFR BLD AUTO: 8.01 10*3/MM3 (ref 1.7–7)
NEUTROPHILS NFR BLD AUTO: 83.9 % (ref 42.7–76)
NITRITE UR QL STRIP: NEGATIVE
NRBC BLD AUTO-RTO: 0 /100 WBC (ref 0–0.2)
PH UR STRIP.AUTO: 7.5 [PH] (ref 5–8)
PLATELET # BLD AUTO: 240 10*3/MM3 (ref 140–450)
PMV BLD AUTO: 9.4 FL (ref 6–12)
POTASSIUM SERPL-SCNC: 4.4 MMOL/L (ref 3.5–5.2)
PROT SERPL-MCNC: 6.4 G/DL (ref 6–8.5)
PROT UR QL STRIP: NEGATIVE
QT INTERVAL: 400 MS
RBC # BLD AUTO: 3.24 10*6/MM3 (ref 3.77–5.28)
RBC # UR STRIP: ABNORMAL /HPF
REF LAB TEST METHOD: ABNORMAL
SODIUM SERPL-SCNC: 142 MMOL/L (ref 136–145)
SP GR UR STRIP: 1.01 (ref 1–1.03)
SQUAMOUS #/AREA URNS HPF: ABNORMAL /HPF
UROBILINOGEN UR QL STRIP: ABNORMAL
WBC # UR STRIP: ABNORMAL /HPF
WBC NRBC COR # BLD: 9.55 10*3/MM3 (ref 3.4–10.8)
WHOLE BLOOD HOLD SPECIMEN: NORMAL

## 2022-01-02 PROCEDURE — 85025 COMPLETE CBC W/AUTO DIFF WBC: CPT | Performed by: PHYSICIAN ASSISTANT

## 2022-01-02 PROCEDURE — 72125 CT NECK SPINE W/O DYE: CPT

## 2022-01-02 PROCEDURE — 96365 THER/PROPH/DIAG IV INF INIT: CPT

## 2022-01-02 PROCEDURE — 70450 CT HEAD/BRAIN W/O DYE: CPT

## 2022-01-02 PROCEDURE — 93010 ELECTROCARDIOGRAM REPORT: CPT | Performed by: INTERNAL MEDICINE

## 2022-01-02 PROCEDURE — 81001 URINALYSIS AUTO W/SCOPE: CPT | Performed by: PHYSICIAN ASSISTANT

## 2022-01-02 PROCEDURE — 87186 SC STD MICRODIL/AGAR DIL: CPT | Performed by: EMERGENCY MEDICINE

## 2022-01-02 PROCEDURE — 93005 ELECTROCARDIOGRAM TRACING: CPT | Performed by: PHYSICIAN ASSISTANT

## 2022-01-02 PROCEDURE — 71045 X-RAY EXAM CHEST 1 VIEW: CPT

## 2022-01-02 PROCEDURE — P9612 CATHETERIZE FOR URINE SPEC: HCPCS

## 2022-01-02 PROCEDURE — 87086 URINE CULTURE/COLONY COUNT: CPT | Performed by: EMERGENCY MEDICINE

## 2022-01-02 PROCEDURE — 80053 COMPREHEN METABOLIC PANEL: CPT | Performed by: PHYSICIAN ASSISTANT

## 2022-01-02 PROCEDURE — 25010000002 CEFTRIAXONE PER 250 MG: Performed by: EMERGENCY MEDICINE

## 2022-01-02 PROCEDURE — 87088 URINE BACTERIA CULTURE: CPT | Performed by: EMERGENCY MEDICINE

## 2022-01-02 PROCEDURE — 99284 EMERGENCY DEPT VISIT MOD MDM: CPT

## 2022-01-02 RX ORDER — CEPHALEXIN 500 MG/1
500 CAPSULE ORAL 2 TIMES DAILY
Qty: 14 CAPSULE | Refills: 0 | Status: SHIPPED | OUTPATIENT
Start: 2022-01-02

## 2022-01-02 RX ADMIN — CEFTRIAXONE 1 G: 1 INJECTION, POWDER, FOR SOLUTION INTRAMUSCULAR; INTRAVENOUS at 11:07

## 2022-01-02 NOTE — ED TRIAGE NOTES
All triage performed with this RN wearing appropriate PPE.  Pt placed in mask upon arrival to ED.  Patient to ED after unwitnessed fall this am. She lives in a memory care unit and was checked on at 6 am. She was in bed at that time. She was later found on the floor after attempting to get up unassisted. She did hit her head but denies LOC or pain. She is confused at baseline.

## 2022-01-02 NOTE — ED PROVIDER NOTES
EMERGENCY DEPARTMENT ENCOUNTER    Room Number: 39/39  Date seen: 1/2/2022  Time seen: 09:31 EST  PCP: Shantel Michelle MD    Spoken Language:  English  Language interpretation services not needed     CHIEF COMPLAINT: unwitnessed fall    HPI: Dory Hampton is a 81 y.o. female presenting to the ED for evaluation following an unwitnessed fall in her nursing facility. The history is being obtained by the patient and by review of the medical chart.  The patient is a resident of a memory care unit.  Apparently, she was found on the floor beside her bed.  The patient states that she does not remember why she fell.  She reports that the staff was able to get her back in her bed before EMS arrived at the nursing facility to bring her to the hospital.  She is unsure if she hit her head.  The patient currently denies any complaints.  She denies pain.  Specifically, she denies headache, chest pain, shortness of breath, abdominal pain, nausea, vomiting or diarrhea.    MEDICAL RECORD REVIEW:  Reviewed in [employer].     PAST MEDICAL HISTORY  Past Medical History:   Diagnosis Date   • Abnormal electrocardiogram    • Anemia    • Aortic stenosis     severe; s/p repair   • Asthma     HISTORY   • Bilateral carotid artery stenosis 12/1/2020   • Bradycardia    • Coronary artery disease    • Diastolic dysfunction     grade II   • Environmental allergies    • Female stress incontinence    • Hemolytic anemia, mechanical (HCC) 8/8/2016   • History of diverticulosis    • History of transfusion    • Hyperlipidemia    • Hypertension    • Hypokalemia    • Hypothyroid    • Iron deficiency anemia     hemolytic anemia   • Iron deficiency anemia due to chronic blood loss 8/8/2016   • Left ventricular hypertrophy    • Murmur    • PAF (paroxysmal atrial fibrillation) (formerly [facility])     with RVR   • Pleural effusion     bilateral, post op   • Psoriasis    • Tendinitis    • Thoracic aorta atherosclerosis (formerly [facility])        PAST SURGICAL HISTORY  Past Surgical History:    Procedure Laterality Date   • AORTIC VALVE REPAIR/REPLACEMENT N/A 2016    Procedure: BHARGAVI MINI STERNOTOMY AORTIC VALVE REPLACEMENT;  Surgeon: Robin Jones MD;  Location: [facility] OR;  Service:    • APPENDECTOMY  1972   • CAPSULE ENDOSCOPY     • CARDIAC CATHETERIZATION N/A 2016    Procedure: Coronary angiography;  Surgeon: Lulú Hooper MD;  Location: [facility] CATH INVASIVE LOCATION;  Service:    • CARDIAC CATHETERIZATION N/A 2016    Procedure: Right Heart Cath;  Surgeon: Lulú Hooper MD;  [facility];  Service:    • CATARACT EXTRACTION Bilateral     Left 12/10/13, Right 2013   • CATARACT EXTRACTION     • COLONOSCOPY  2015    Dr. Mohamud   • DENTAL PROCEDURE  2011       • ENDOSCOPY  2015    Dr. Mohamud   • ENTEROSCOPY SMALL BOWEL N/A 2016    Procedure: ENTEROSCOPY SMALL BOWEL WITH APC CAUTERY;  Surgeon: Tj Torres MD;  Location:  [facility];  Service:    • PELVIC LAPAROSCOPY     • STERNOTOMY      mini   • TONSILLECTOMY     • TUBAL ABDOMINAL LIGATION         FAMILY HISTORY  Family History   Problem Relation Age of Onset   • Endometriosis Mother    • Dementia Mother    • Hypertension Mother    • Uterine cancer Mother    • Alzheimer's disease Mother    • Tongue cancer Father    • Heart failure Maternal Grandmother    • Heart attack Maternal Grandmother    • Stroke Maternal Grandfather    • Heart disease Maternal Grandfather    • Hypertension Maternal Aunt    • Hypertension Maternal Uncle        SOCIAL HISTORY  Social History     Socioeconomic History   • Marital status:      Spouse name: Kody   • Years of education: College   Tobacco Use   • Smoking status: Former Smoker     Packs/day: 1.00     Years: 20.00     Pack years: 20.00     Types: Cigarettes     Quit date:      Years since quittin.0   • Smokeless tobacco: Never Used   Substance and Sexual Activity   • Alcohol use: Yes     Comment:  Occ///caffeine use: 1 cup daily   • Drug use: No   • Sexual activity: Defer       CURRENT MEDICATIONS  Prior to Admission medications    Medication Sig Start Date End Date Taking? Authorizing Provider   aspirin 81 MG EC tablet Take 1 tablet by mouth Daily. 12/23/16   Naima Varela PA   azelastine (ASTELIN) 0.1 % nasal spray INSTILL 2 SPRAYS INTO EACH NOSTRIL TWICE DAILY 8/16/21   Natasha Billy MD   Cyanocobalamin (VITAMIN B-12 PO) Take 1,000 mcg by mouth daily.    Delmis Jaime MD   donepezil (ARICEPT) 10 MG tablet TAKE 1 TABLET BY MOUTH EVERY NIGHT 5/28/21   Nelson Cole III, NP-C   loratadine (Claritin) 10 MG tablet Take 10 mg by mouth Daily.    Delmis Jaime MD   Multiple Vitamins-Minerals (CENTRUM SILVER PO) Take 1 tablet by mouth Daily. PT HOLDING FOR SURGERY    Delmis Jaime MD   Pyridoxine HCl (VITAMIN B6 PO) Take 100 mg by mouth daily.    Delmis Jaime MD   Synthroid 50 MCG tablet TAKE 1 TABLET BY MOUTH DAILY 6/10/21   Natasha Billy MD       ALLERGIES  Eggs or egg-derived products, Formaldehyde, Other, Penicillins, and Sulfa antibiotics    REVIEW OF SYSTEMS  All systems reviewed and negative except for those discussed in HPI.     PHYSICAL EXAM  ED Triage Vitals [01/02/22 0848]   Temp Heart Rate Resp BP SpO2   98.6 °F (37 °C) 72 16 144/62 98 %      Temp src Heart Rate Source Patient Position BP Location FiO2 (%)   Tympanic Monitor -- -- --       Physical Exam  Constitutional:       Appearance: Normal appearance.   HENT:      Head: Normocephalic and atraumatic.      Mouth/Throat:      Mouth: Mucous membranes are moist.   Eyes:      Extraocular Movements: Extraocular movements intact.      Pupils: Pupils are equal, round, and reactive to light.   Cardiovascular:      Rate and Rhythm: Normal rate and regular rhythm.      Heart sounds: Murmur heard.       Pulmonary:      Effort: Pulmonary effort is normal.      Breath sounds: Normal breath sounds.    Abdominal:      General: There is no distension.      Palpations: Abdomen is soft.      Tenderness: There is no abdominal tenderness.   Musculoskeletal:      Cervical back: Normal range of motion.   Skin:     General: Skin is warm and dry.   Neurological:      Mental Status: She is alert.   Psychiatric:         Mood and Affect: Mood normal.         Behavior: Behavior normal.         Thought Content: Thought content normal.         Judgment: Judgment normal.         PROCEDURES  Procedures  None    LABS  Recent Results (from the past 24 hour(s))   ECG 12 Lead    Collection Time: 01/02/22  9:19 AM   Result Value Ref Range    QT Interval 400 ms   Urinalysis With Microscopic If Indicated (No Culture) - Urine, Catheter    Collection Time: 01/02/22  9:53 AM    Specimen: Urine, Catheter   Result Value Ref Range    Color, UA Yellow Yellow, Straw    Appearance, UA Clear Clear    pH, UA 7.5 5.0 - 8.0    Specific Gravity, UA 1.008 1.005 - 1.030    Glucose, UA Negative Negative    Ketones, UA Negative Negative    Bilirubin, UA Negative Negative    Blood, UA Small (1+) (A) Negative    Protein, UA Negative Negative    Leuk Esterase, UA Large (3+) (A) Negative    Nitrite, UA Negative Negative    Urobilinogen, UA 0.2 E.U./dL 0.2 - 1.0 E.U./dL   Urinalysis, Microscopic Only - Urine, Catheter    Collection Time: 01/02/22  9:53 AM    Specimen: Urine, Catheter   Result Value Ref Range    RBC, UA 6-12 (A) None Seen, 0-2 /HPF    WBC, UA Too Numerous to Count (A) None Seen, 0-2 /HPF    Bacteria, UA 4+ (A) None Seen /HPF    Squamous Epithelial Cells, UA 0-2 None Seen, 0-2 /HPF    Hyaline Casts, UA 3-6 None Seen /LPF    Methodology Automated Microscopy    Comprehensive Metabolic Panel    Collection Time: 01/02/22 10:01 AM    Specimen: Blood   Result Value Ref Range    Glucose 98 65 - 99 mg/dL    BUN 18 8 - 23 mg/dL    Creatinine 0.96 0.57 - 1.00 mg/dL    Sodium 142 136 - 145 mmol/L    Potassium 4.4 3.5 - 5.2 mmol/L    Chloride 106 98 - 107  mmol/L    CO2 27.4 22.0 - 29.0 mmol/L    Calcium 9.1 8.6 - 10.5 mg/dL    Total Protein 6.4 6.0 - 8.5 g/dL    Albumin 4.00 3.50 - 5.20 g/dL    ALT (SGPT) 9 1 - 33 U/L    AST (SGOT) 32 1 - 32 U/L    Alkaline Phosphatase 99 39 - 117 U/L    Total Bilirubin 0.4 0.0 - 1.2 mg/dL    eGFR Non African Amer 56 (L) >60 mL/min/1.73    Globulin 2.4 gm/dL    A/G Ratio 1.7 g/dL    BUN/Creatinine Ratio 18.8 7.0 - 25.0    Anion Gap 8.6 5.0 - 15.0 mmol/L   CBC Auto Differential    Collection Time: 01/02/22 10:01 AM    Specimen: Blood   Result Value Ref Range    WBC 9.55 3.40 - 10.80 10*3/mm3    RBC 3.24 (L) 3.77 - 5.28 10*6/mm3    Hemoglobin 10.8 (L) 12.0 - 15.9 g/dL    Hematocrit 32.6 (L) 34.0 - 46.6 %    .6 (H) 79.0 - 97.0 fL    MCH 33.3 (H) 26.6 - 33.0 pg    MCHC 33.1 31.5 - 35.7 g/dL    RDW 16.0 (H) 12.3 - 15.4 %    RDW-SD 59.9 (H) 37.0 - 54.0 fl    MPV 9.4 6.0 - 12.0 fL    Platelets 240 140 - 450 10*3/mm3    Neutrophil % 83.9 (H) 42.7 - 76.0 %    Lymphocyte % 10.8 (L) 19.6 - 45.3 %    Monocyte % 4.5 (L) 5.0 - 12.0 %    Eosinophil % 0.1 (L) 0.3 - 6.2 %    Basophil % 0.4 0.0 - 1.5 %    Immature Grans % 0.3 0.0 - 0.5 %    Neutrophils, Absolute 8.01 (H) 1.70 - 7.00 10*3/mm3    Lymphocytes, Absolute 1.03 0.70 - 3.10 10*3/mm3    Monocytes, Absolute 0.43 0.10 - 0.90 10*3/mm3    Eosinophils, Absolute 0.01 0.00 - 0.40 10*3/mm3    Basophils, Absolute 0.04 0.00 - 0.20 10*3/mm3    Immature Grans, Absolute 0.03 0.00 - 0.05 10*3/mm3    nRBC 0.0 0.0 - 0.2 /100 WBC   Light Blue Top    Collection Time: 01/02/22 10:01 AM   Result Value Ref Range    Extra Tube hold for add-on        RADIOLOGY  CT Head Without Contrast   Final Result       No acute intracranial hemorrhage or hydrocephalus. No acute cervical   fracture identified. If there is further clinical concern, MRI could be   considered for further evaluation.       Incidental findings as noted.       This report was finalized on 1/2/2022 10:01 AM by Dr. Rajan Santos M.D.           CT Cervical Spine Without Contrast   Final Result       No acute intracranial hemorrhage or hydrocephalus. No acute cervical   fracture identified. If there is further clinical concern, MRI could be   considered for further evaluation.       Incidental findings as noted.       This report was finalized on 1/2/2022 10:01 AM by Dr. Rajan Santos M.D.          XR Chest 1 View   Final Result   No focal pulmonary consolidation or pneumothorax. Borderline   heart size. Follow-up as clinical indications persist.       This report was finalized on 1/2/2022 9:21 AM by Dr. Rajan Santos M.D.              MEDICATIONS GIVEN IN THE ER  Medications   cefTRIAXone (ROCEPHIN) 1 g in sodium chloride 0.9 % 100 mL IVPB-VTB (1 g Intravenous New Bag 1/2/22 1107)       MEDICAL DECISION MAKING, CONSULTS AND PROGRESS NOTES  All labs and all radiology studies were have been viewed and interpreted by me.   Discussion below represents my analysis of pertinent findings related to patient's condition, differential diagnosis, treatment plan and final disposition.    PPE: The patient was placed in a face mask in first look. Patient was wearing facemask when I entered the room and throughout our encounter. I wore full protective equipment throughout this patient encounter including a face mask, eye protection and gloves. Hand hygiene was performed before donning protective equipment and after removal when leaving the room.    AS OF 11:28 EST VITALS:    BP - 148/68  HR - 74  TEMP - 98.6 °F (37 °C) (Tympanic)  O2 SATS - 97%       The patient's history, physical exam, and lab findings were discussed with the physician, who also performed a face to face history and physical exam.  I discussed all results and noted any abnormalities with patient.  Discussed absoute need to recheck abnormalities with their family physician.  I answered any of the patient's questions.  Discussed plan for discharge, as there is no emergent indication for  admission.  Pt is agreeable and understands need for follow up and repeat testing.  Pt is aware that discharge does not mean that nothing is wrong but it indicates no emergency is present and they must continue care with their family physician.  Pt is discharged with instructions to follow up with primary care doctor to have their blood pressure rechecked.     DIAGNOSIS   Diagnosis Plan   1. Acute cystitis with hematuria     2. Fall, initial encounter         DISPOSITION  ED Disposition     ED Disposition Condition Comment    Discharge Stable           FOLLOW UP  Shantel Michelle MD  [address] [address]  [phone]    Schedule an appointment as soon as possible for a visit         RX  Medications   cefTRIAXone (ROCEPHIN) 1 g in sodium chloride 0.9 % 100 mL IVPB-VTB (1 g Intravenous New Bag 1/2/22 1987)          Medication List      New Prescriptions    cephalexin 500 MG capsule  Commonly known as: KEFLEX  Take 1 capsule by mouth 2 (Two) Times a Day.           Where to Get Your Medications      You can get these medications from any pharmacy    Bring a paper prescription for each of these medications  · cephalexin 500 MG capsule       Provider Attestation:  I personally reviewed the past medical history, past surgical history, social history, family history, current medications and allergies as they appear in the chart. I reviewed the patient's history, physical, lab/imaging results and overall care with Dr. Hudson who is in agreement with the patient's treatment plan.    EMR Dragon/Transcription disclaimer:  Some of this encounter note is an electronic transcription/translation of spoken language to printed text. The electronic translation of spoken language may permit erroneous, or at times, nonsensical words or phrases to be inadvertently transcribed; Although I have reviewed the note for such errors, some may still exist.    Provider note signed by:         Sujata Burden,  PA  01/02/22 1128

## 2022-01-02 NOTE — ED PROVIDER NOTES
MD ATTESTATION NOTE    The JEROME and I have discussed this patient's history, physical exam, and treatment plan.  I have reviewed the documentation and personally had a face to face interaction with the patient. I affirm the documentation and agree with the treatment and plan.  The attached note describes my personal findings.    Please see the JEROME note       Ariel Hudson MD  01/02/22 2841

## 2022-01-02 NOTE — ED NOTES
Patient wearing mask, nurse wearing mask, n95 and protective eyewear during care and assessment.  Hand hygiene performed prior to and post care.      Christopher Gonzales RN  01/02/22 1927

## 2022-01-02 NOTE — CASE MANAGEMENT/SOCIAL WORK
Call placed to son and daughter listed on facesheet for transport preference per primary RN request; Spoke w/ (daughter) who agreed to  patient to transport back to [facility]. Updated primary RN. Natasha Mix RN

## 2022-01-02 NOTE — DISCHARGE INSTRUCTIONS
Although you are being discharged from the ED today, I encourage you to return for worsening symptoms. Things can, and do, change such that treatment at home with medication may not be adequate. Specifically I recommend returning for chest pain or discomfort, difficulty breathing, persistent vomiting or difficulty holding down liquids or medications, fever > 102.0 F or any other worsening or alarming symptoms.     Rest. Drink plenty of fluids.  Follow up with primary care provider for further management and to have blood pressure rechecked.

## 2022-01-02 NOTE — ED NOTES
Discharge instructions reviewed with daughter Sara.  No questions noted.      Christopher Gonzales RN  01/02/22 [id number]

## 2022-01-02 NOTE — ED NOTES
Daughter en route to facility to  patient. ETA unknown.      Christopher Gonzales, KAREN  01/02/22 9517

## 2022-01-04 LAB — BACTERIA SPEC AEROBE CULT: ABNORMAL

## 2022-01-13 ENCOUNTER — APPOINTMENT (OUTPATIENT)
Dept: PET IMAGING | Facility: HOSPITAL | Age: 82
End: 2022-01-13

## 2022-01-20 ENCOUNTER — APPOINTMENT (OUTPATIENT)
Dept: LAB | Facility: HOSPITAL | Age: 82
End: 2022-01-20

## 2022-02-07 ENCOUNTER — APPOINTMENT (OUTPATIENT)
Dept: GENERAL RADIOLOGY | Facility: HOSPITAL | Age: 82
End: 2022-02-07

## 2022-02-07 ENCOUNTER — HOSPITAL ENCOUNTER (EMERGENCY)
Facility: HOSPITAL | Age: 82
Discharge: NURSING, REHAB OR LONG-TERM CARE | End: 2022-02-07
Attending: EMERGENCY MEDICINE | Admitting: EMERGENCY MEDICINE

## 2022-02-07 VITALS
OXYGEN SATURATION: 97 % | DIASTOLIC BLOOD PRESSURE: 109 MMHG | HEART RATE: 91 BPM | SYSTOLIC BLOOD PRESSURE: 199 MMHG | RESPIRATION RATE: 18 BRPM | HEIGHT: 62 IN | TEMPERATURE: 98 F | BODY MASS INDEX: 21.95 KG/M2

## 2022-02-07 DIAGNOSIS — M54.50 LOW BACK PAIN WITHOUT SCIATICA, UNSPECIFIED BACK PAIN LATERALITY, UNSPECIFIED CHRONICITY: Primary | ICD-10-CM

## 2022-02-07 DIAGNOSIS — F03.90 DEMENTIA WITHOUT BEHAVIORAL DISTURBANCE, UNSPECIFIED DEMENTIA TYPE: ICD-10-CM

## 2022-02-07 DIAGNOSIS — E03.9 HYPOTHYROIDISM, UNSPECIFIED TYPE: ICD-10-CM
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
## 2022-02-07 PROCEDURE — 99283 EMERGENCY DEPT VISIT LOW MDM: CPT

## 2022-02-07 PROCEDURE — 72110 X-RAY EXAM L-2 SPINE 4/>VWS: CPT

## 2022-02-07 NOTE — ED PROVIDER NOTES
The JEROME and I have discussed this patients history, physical exam, and treatment plan. I have reviewed the documentation and personally had a face to face interaction with the patient. I affirm the documentation and agree with the treatment and plan.  The following note describes my personal findings    I provided a substantive portion of the care of this patient.  I personally performed the physical exam in its entirety for this encounter.      This patient is an 81-year-old female with a past medical history of dementia who presents to the emergency room today complaining of low back pain after a fall at her nursing facility.  She does remember her fall and states that she lost her balance causing her fall.  She denies any pain or trauma to the head neck region.  She denies numbness to the groin, extremity numbness or weakness, urinary retention, or fecal incontinence.    Physical Exam  Vitals and nursing note reviewed.   Constitutional:       General: She is not in acute distress.     Appearance: She is well-developed.   HENT:      Head: Normocephalic.   Eyes:      Pupils: Pupils are equal, round, and reactive to light.   Cardiovascular:      Rate and Rhythm: Normal rate and regular rhythm.   Pulmonary:      Effort: Pulmonary effort is normal. No respiratory distress.      Breath sounds: Normal breath sounds.   Abdominal:      General: Bowel sounds are normal.      Palpations: Abdomen is soft.      Tenderness: There is no abdominal tenderness. There is no guarding or rebound.   Musculoskeletal:         General: Normal range of motion.      Cervical back: Normal range of motion and neck supple.      Comments: Mild midline tenderness to the lumbar region without step-off or deformity   Skin:     General: Skin is warm and dry.      Findings: No rash.   Neurological:      Mental Status: She is alert and oriented to person, place, and time.       Plan: We will monitor in the emergency room and obtain an x-ray of her  lumbar spine.  We will reassess following.      The patient was wearing a facemask upon entrance into the room and remained in such throughout their visit.  I was wearing PPE including a facemask, eye protection, as well as gloves at any point entering the room and throughout the visit     Peng Kelsey MD  02/07/22 0624

## 2022-02-07 NOTE — ED PROVIDER NOTES
EMERGENCY DEPARTMENT ENCOUNTER    Room Number:  02/02  Date of encounter:  2/7/2022  PCP: Rosette Julien APRN  Historian: Patient      HPI:  Chief Complaint: Fall  A complete HPI/ROS/PMH/PSH/SH/FH are unobtainable due to: Dementia    Context: Dory Hampton is a 81 y.o. female with past medical history of HTN, HLD, CKD, CAD, atrial fibrillation, hypothyroidism, and dementia who presents to the ED c/o back pain after a fall.  Patient had an unwitnessed fall at the nursing home, patient states that she lost her balance and fell in her lower back hurts now.  Patient denies any neck pain, headache, chest pain, loss of consciousness, shortness of breath, or any other injuries.      PAST MEDICAL HISTORY  Active Ambulatory Problems     Diagnosis Date Noted   • Essential hypertension    • Hyperlipidemia    • Hypothyroidism, adult    • Iron deficiency anemia due to chronic blood loss 08/08/2016   • Macrocytic anemia 08/08/2016   • AVM (arteriovenous malformation) of small bowel, acquired with hemorrhage 11/21/2016   • S/P AVR 01/18/2017   • Paroxysmal atrial fibrillation (HCC) 12/02/2019   • Sinus bradycardia 12/01/2020   • Bilateral carotid artery stenosis 12/01/2020   • Generalized weakness 10/29/2021   • Dementia ([facility]) 10/29/2021   • Stage 3 chronic kidney disease ([facility]) 10/29/2021   • Lung nodule 10/30/2021   • CAD (coronary artery disease) 10/30/2021   • Anemia, chronic disease 10/30/2021   • Abnormal LFTs 10/30/2021     Resolved Ambulatory Problems     Diagnosis Date Noted   • Aortic stenosis    • Thoracic aorta atherosclerosis ([facility])    • Hemolytic anemia, mechanical ([facility]) 08/08/2016   • Dyspnea on effort 08/11/2016   • Aortic valve stenosis 12/16/2016   • Cognitive change 12/02/2019   • JOSÉ LUIS (acute kidney injury) ([facility]) 10/29/2021   • Leukocytosis 10/30/2021     Past Medical History:   Diagnosis Date   • Abnormal electrocardiogram    • Anemia    • Asthma    • Bradycardia    • Coronary artery disease    •  Diastolic dysfunction    • Environmental allergies    • Female stress incontinence    • History of diverticulosis    • History of transfusion    • Hypertension    • Hypokalemia    • Hypothyroid    • Iron deficiency anemia    • Left ventricular hypertrophy    • Murmur    • PAF (paroxysmal atrial fibrillation) (HCC)    • Pleural effusion    • Psoriasis    • Tendinitis          PAST SURGICAL HISTORY  Past Surgical History:   Procedure Laterality Date   • AORTIC VALVE REPAIR/REPLACEMENT N/A 12/16/2016    Procedure: BHARGAVI MINI STERNOTOMY AORTIC VALVE REPLACEMENT;  Surgeon: Robin Jones MD;  Location: [facility] OR;  Service:    • APPENDECTOMY  06/1972   • CAPSULE ENDOSCOPY  2015   • CARDIAC CATHETERIZATION N/A 8/25/2016    Procedure: Coronary angiography;  Surgeon: Lulú Hooper MD;  Location:  [facility] CATH INVASIVE LOCATION;  Service:    • CARDIAC CATHETERIZATION N/A 8/25/2016    Procedure: Right Heart Cath;  Surgeon: Lulú Hooper MD;  [facility];  Service:    • CATARACT EXTRACTION Bilateral     Left 12/10/13, Right 12/17/2013   • CATARACT EXTRACTION     • COLONOSCOPY  06/22/2015    Dr. Mohamud   • DENTAL PROCEDURE  2011    Implants   • ENDOSCOPY  06/22/2015    Dr. Mohamud   • ENTEROSCOPY SMALL BOWEL N/A 11/21/2016    Procedure: ENTEROSCOPY SMALL BOWEL WITH APC CAUTERY;  Surgeon: Tj Torres MD;  [facility];  Service:    • PELVIC LAPAROSCOPY     • STERNOTOMY      mini   • TONSILLECTOMY     • TUBAL ABDOMINAL LIGATION           FAMILY HISTORY  Family History   Problem Relation Age of Onset   • Endometriosis Mother    • Dementia Mother    • Hypertension Mother    • Uterine cancer Mother    • Alzheimer's disease Mother    • Tongue cancer Father    • Heart failure Maternal Grandmother    • Heart attack Maternal Grandmother    • Stroke Maternal Grandfather    • Heart disease Maternal Grandfather    • Hypertension Maternal Aunt    • Hypertension Maternal Uncle           SOCIAL HISTORY  Social History     Socioeconomic History   • Marital status:      Spouse name: Kody   • Years of education: College   Tobacco Use   • Smoking status: Former Smoker     Packs/day: 1.00     Years: 20.00     Pack years: 20.00     Types: Cigarettes     Quit date:      Years since quittin.1   • Smokeless tobacco: Never Used   Substance and Sexual Activity   • Alcohol use: Yes     Comment: Occ///caffeine use: 1 cup daily   • Drug use: No   • Sexual activity: Defer         ALLERGIES  Eggs or egg-derived products, Formaldehyde, Other, Penicillins, and Sulfa antibiotics        REVIEW OF SYSTEMS  Review of Systems     All systems reviewed and negative except for those discussed in HPI.       PHYSICAL EXAM    I have reviewed the triage vital signs and nursing notes.    ED Triage Vitals [22 0232]   Temp Heart Rate Resp BP SpO2   98 °F (36.7 °C) 66 18 (!) 192/70 98 %      Temp src Heart Rate Source Patient Position BP Location FiO2 (%)   Oral Monitor -- -- --       Physical Exam  GENERAL:, Alert, not distressed  HENT: no hemotympanum, no rhinorrhea, no dental injury or malocclusion  EYES: no scleral icterus, PERRL, EOMI  CV: regular rhythm, regular rate  RESPIRATORY: normal effort  ABDOMEN: soft  MUSCULOSKELETAL: no deformity  NEURO: alert, moves all extremities, follows commands  SKIN: warm, dry        LAB RESULTS  No results found for this or any previous visit (from the past 24 hour(s)).    Ordered the above labs and independently reviewed the results.        RADIOLOGY  XR Spine Lumbar Complete 4+VW    Result Date: 2022  4 VIEWS LUMBAR SPINE  HISTORY: Low back pain after a fall  COMPARISON: None available.  FINDINGS: No definite acute fracture or subluxation of the lumbar spine is seen. Full assessment is difficult due to exaggeration of normal curvature, as well as extensive degenerative changes. There are dense vascular calcifications. There are advanced degenerative  changes involving the right hip. Additional degenerative changes are noted involving the SI joints bilaterally.      No definite acute fracture or subluxation identified on this technically limited study.  This report was finalized on 2/7/2022 4:05 AM by Dr. Lakisha Gandara M.D.        I ordered the above noted radiological studies. Reviewed by me and discussed with radiologist.  See dictation for official radiology interpretation.      PROCEDURES    Procedures      MEDICATIONS GIVEN IN ER    Medications - No data to display      PROGRESS, DATA ANALYSIS, CONSULTS, AND MEDICAL DECISION MAKING    All labs have been independently reviewed by me.  All radiology studies have been reviewed by me and discussed with radiologist dictating the report.   EKG's independently viewed and interpreted by me.  Discussion below represents my analysis of pertinent findings related to patient's condition, differential diagnosis, treatment plan and final disposition.    DDx: Includes but not limited to low back pain, compression fracture, low back contusion      ED Course as of 02/07/22 0604   Mon Feb 07, 2022   0507 Patient rechecked, discussed x-ray results, and plan for return to the nursing home.  Patient expressed understanding and agrees to plan. [LUIS ANTONIO]      ED Course User Index  [LUIS ANTONIO] Prem Islas PA       MDM: Patient's x-rays show no evidence for an acute fracture, patient's neuro exam is at baseline and she has no complaints, will send back to the nursing home.    PPE: The patient wore a surgical mask throughout the entire patient encounter. I wore an N95.    AS OF 06:04 EST VITALS:    BP - (!) 189/96  HR - 90  TEMP - 98 °F (36.7 °C) (Oral)  O2 SATS - 97%        DIAGNOSIS  Final diagnoses:   Low back pain without sciatica, unspecified back pain laterality, unspecified chronicity   Fall at nursing home, initial encounter   Dementia without behavioral disturbance, unspecified dementia type (HCC)   Hypothyroidism, unspecified  type         DISPOSITION  DISCHARGE    Patient discharged in stable condition.    Reviewed implications of results, diagnosis, meds, responsibility to follow up, warning signs and symptoms of possible worsening, potential complications and reasons to return to ER.    Patient/Family voiced understanding of above instructions.    Discussed plan for discharge, as there is no emergent indication for admission. Patient referred to primary care provider for BP management due to today's BP. Pt/family is agreeable and understands need for follow up and repeat testing.  Pt is aware that discharge does not mean that nothing is wrong but it indicates no emergency is present that requires admission and they must continue care with follow-up as given below or physician of their choice.     FOLLOW-UP  Rosette Julien, APRN  [address] [address]  [phone]    Schedule an appointment as soon as possible for a visit in 3 days           Medication List      No changes were made to your prescriptions during this visit.                    Prem Islas PA  02/07/22 0601       Prem Islas PA  02/07/22 0604

## 2022-02-07 NOTE — ED NOTES
Pt to triage from [facility] via [facility] [id number] with c/o unwitnessed fall, says she did not hit her head, c/o pain to low back.  Pt has dementia, is on [facility].  Pt wearing mask in triage. Triage personnel wore appropriate PPE    Pt is not on blood thinners.      Cha Tamayo, RN  02/07/22 0354

## 2022-03-22 NOTE — PROGRESS NOTES
Telehealth Visit    Date of Visit: 2020  Encounter Provider: KEVIN Manjarrez  Place of Service: [facility] CARDIOLOGY  Patient Name: Dory Hampton  :1940  Primary Cardiologist: Dr. Zaria Bryan     Chief Complaint   Patient presents with   • Follow-up   :     Dear Dr. Natasha Billy,     HPI: Dory Hampton is a pleasant 80 y.o. female who is an established patient of our practice. Due to COVID-19 virus, I am conducting a telehealth visit via audio with patient and she has consented to this visit today.      She has been diagnosed with hypertension, hyperlipidemia, and hypothyroidism. She quit smoking in .     In the past, she was diagnosed with a heart murmur and severe aortic stenosis.  In 2016, she underwent a cardiac catheterization which revealed nonobstructive coronary artery disease and severe aortic valve stenosis.  On 16 she underwent mini sternotomy surgery and received a 21 mm magna pericardial prosthetic aortic valve replacement.  She developed postoperative atrial fibrillation, pleural effusions, and anemia.  The pleural effusion resolved with diuretic therapy and she was placed on warfarin for stroke prevention. In 2017, her amiodarone was discontinued and a follow-up Holter monitor showed no recurrence of atrial fibrillation so the warfarin was discontinued.    In 2019, repeat echocardiogram showed normal EF 68%, mild left ventricular concentric hypertrophy, septal wall motion is abnormal, consistent with a post-operative state, left ventricular diastolic dysfunction is noted (grade II w/high LAP) consistent with pseudonormalization, there is a 21 mm bioprosthetic aortic valve present with normal function.     In 2019, she wore a holter monitor which showed normal sinus rhythm, average heart rate 54, minimum 43 and maximum 77, and rare APCs and PVCs. On 2019 carotid duplex showed right carotid mild stenosis  Problem: Falls - Risk of:  Goal: Will remain free from falls  Description: Will remain free from falls  Outcome: Ongoing  Goal: Absence of physical injury  Description: Absence of physical injury  Outcome: Ongoing     Problem: OXYGENATION/RESPIRATORY FUNCTION  Goal: Patient will maintain patent airway  Outcome: Ongoing  Goal: Patient will achieve/maintain normal respiratory rate/effort  Description: Respiratory rate and effort will be within normal limits for the patient  Outcome: Ongoing     Problem: HEMODYNAMIC STATUS  Goal: Patient has stable vital signs and fluid balance  Outcome: Ongoing     Problem: FLUID AND ELECTROLYTE IMBALANCE  Goal: Fluid and electrolyte balance are achieved/maintained  Outcome: Ongoing     Problem: ACTIVITY INTOLERANCE/IMPAIRED MOBILITY  Goal: Mobility/activity is maintained at optimum level for patient  Outcome: Ongoing     Problem: Nutrition  Goal: Optimal nutrition therapy  Outcome: Ongoing     Problem: Pain:  Goal: Pain level will decrease  Description: Pain level will decrease  Outcome: Ongoing  Goal: Control of acute pain  Description: Control of acute pain  Outcome: Ongoing  Goal: Control of chronic pain  Description: Control of chronic pain  Outcome: Ongoing     Problem: Skin Integrity:  Goal: Will show no infection signs and symptoms  Description: Will show no infection signs and symptoms  Outcome: Ongoing  Goal: Absence of new skin breakdown  Description: Absence of new skin breakdown  Outcome: Ongoing "and left moderate stenosis.    Today is her follow audio visit and her daughter, Sara is also on the phone. Her daughter said she is \"cold natured\" and is not very active in the house because of that. Her heart rate is low today (45 bpm) and was 49 the other day in PCP office. She reports a couple episodes of dizziness. She denies chest pain, shortness of breath, paroxysmal nocturnal dyspnea, orthopnea, cough, wheezing, edema, palpitations, syncopal episodes, falls, or bleeding.     I reviewed her recent blood work from November 2020: CMP stable, CBC showed abnormalities, cholesterol high, and TSH abnormal.     Past Medical History:   Diagnosis Date   • Abnormal electrocardiogram    • Anemia    • Aortic stenosis     severe; s/p repair   • Asthma     HISTORY   • Bradycardia    • Coronary artery disease    • Diastolic dysfunction     grade II   • Environmental allergies    • Female stress incontinence    • Hemolytic anemia, mechanical (CMS/HCC) 8/8/2016   • History of diverticulosis    • History of transfusion    • Hyperlipidemia    • Hypertension    • Hypokalemia    • Hypothyroid    • Iron deficiency anemia     hemolytic anemia   • Iron deficiency anemia due to chronic blood loss 8/8/2016   • Left ventricular hypertrophy    • Murmur    • PAF (paroxysmal atrial fibrillation) (CMS/HCC)     with RVR   • Pleural effusion     bilateral, post op   • Psoriasis    • Tendinitis    • Thoracic aorta atherosclerosis (CMS/HCC)        Past Surgical History:   Procedure Laterality Date   • AORTIC VALVE REPAIR/REPLACEMENT N/A 12/16/2016    Procedure: BHARGAVI MINI STERNOTOMY AORTIC VALVE REPLACEMENT;  Surgeon: Robin Jones MD;  Location: [facility] OR;  Service:    • APPENDECTOMY  06/1972   • CAPSULE ENDOSCOPY  2015   • CARDIAC CATHETERIZATION N/A 8/25/2016    Procedure: Coronary angiography;  Surgeon: Lulú Hooper MD;  Location: [facility] CATH INVASIVE LOCATION;  Service:    • CARDIAC CATHETERIZATION N/A 8/25/2016    Procedure: " Right Heart Cath;  Surgeon: Lulú Hooper MD;  Location:  [facility] CATH INVASIVE LOCATION;  Service:    • CATARACT EXTRACTION Bilateral     Left 12/10/13, Right 12/17/2013   • CATARACT EXTRACTION     • COLONOSCOPY  06/22/2015    Dr. Mohamud   • DENTAL PROCEDURE  2011    Implants   • ENDOSCOPY  06/22/2015    Dr. Mohamud   • ENTEROSCOPY SMALL BOWEL N/A 11/21/2016    Procedure: ENTEROSCOPY SMALL BOWEL WITH APC CAUTERY;  Surgeon: Tj Torres MD;  [facility];  Service:    • PELVIC LAPAROSCOPY     • STERNOTOMY      mini   • TONSILLECTOMY     • TUBAL ABDOMINAL LIGATION         Social History     Socioeconomic History   • Marital status:      Spouse name: Kody   • Number of children: Not on file   • Years of education: College   • Highest education level: Not on file   Occupational History   • Occupation: Teacher, retired     Comment: [employer]   Tobacco Use   • Smoking status: Former Smoker     Packs/day: 1.00     Years: 20.00     Pack years: 20.00     Types: Cigarettes     Quit date: 2005     Years since quitting: 15.9   • Smokeless tobacco: Never Used   Substance and Sexual Activity   • Alcohol use: Yes     Comment: 1 drink a month///caffeine use: 1 cup daily   • Drug use: No   • Sexual activity: Never       Family History   Problem Relation Age of Onset   • Endometriosis Mother    • Dementia Mother    • Hypertension Mother    • Uterine cancer Mother    • Alzheimer's disease Mother    • Tongue cancer Father    • Heart failure Maternal Grandmother    • Heart attack Maternal Grandmother    • Stroke Maternal Grandfather    • Heart disease Maternal Grandfather    • Hypertension Maternal Aunt    • Hypertension Maternal Uncle        The following portion of the patient's history were reviewed and updated as appropriate: past medical history, past surgical history, past social history, past family history, allergies, current medications, and problem list.    Review of Systems   Constitution: Positive  for malaise/fatigue.   Cardiovascular: Negative.    Respiratory: Negative.    Endocrine: Positive for cold intolerance.   Hematologic/Lymphatic: Negative.    Skin: Positive for dry skin.   Neurological: Positive for dizziness.       Allergies   Allergen Reactions   • Eggs Or Egg-Derived Products Swelling   • Formaldehyde Other (See Comments)     Positive allergy testing   • Other Swelling     Tree nuts, pecans, walnuts, eggs        Mouth swells   • Penicillins Rash   • Sulfa Antibiotics Rash         Current Outpatient Medications:   •  aspirin 81 MG EC tablet, Take 1 tablet by mouth Daily., Disp: , Rfl:   •  azelastine (ASTELIN) 0.1 % nasal spray, 2 sprays into the nostril(s) as directed by provider 2 (Two) Times a Day. Use in each nostril as directed, Disp: 1 each, Rfl: 5  •  Cyanocobalamin (VITAMIN B-12 PO), Take 1,000 mcg by mouth daily., Disp: , Rfl:   •  donepezil (ARICEPT) 10 MG tablet, Take 1 tablet by mouth Every Night., Disp: 90 tablet, Rfl: 1  •  Hydrocortisone Butyr Lipo Base (Locoid Lipocream) 0.1 % cream, Apply sparingly twice a day as needed for 7 days., Disp: 45 g, Rfl: 0  •  loratadine (Claritin) 10 MG tablet, Take 10 mg by mouth Daily., Disp: , Rfl:   •  losartan-hydrochlorothiazide (HYZAAR) 100-25 MG per tablet, TAKE 1 TABLET DAILY, Disp: 90 tablet, Rfl: 4  •  melatonin 1 MG tablet, Take  by mouth Every Night., Disp: , Rfl:   •  metoprolol tartrate (LOPRESSOR) 50 MG tablet, TAKE 1 TABLET BY MOUTH TWICE DAILY, Disp: 180 tablet, Rfl: 1  •  montelukast (SINGULAIR) 10 MG tablet, TAKE 1 TABLET BY MOUTH EVERY NIGHT, Disp: 30 tablet, Rfl: 5  •  Multiple Vitamins-Minerals (CENTRUM SILVER PO), Take 1 tablet by mouth Daily. PT HOLDING FOR SURGERY, Disp: , Rfl:   •  Pyridoxine HCl (VITAMIN B6 PO), Take 100 mg by mouth daily., Disp: , Rfl:   •  SYNTHROID 50 MCG tablet, TAKE 1 TABLET BY MOUTH DAILY, Disp: 90 tablet, Rfl: 1        Objective:     Vitals:    12/01/20 1305   BP: 124/60   BP Location: Right arm  "  Pulse: (!) 45   Weight: 54.3 kg (119 lb 9.6 oz)   Height: 152.4 cm (60\")     Body mass index is 23.36 kg/m².    Due to telehealth visit, there was no EKG, vitals, or weight performed in our office.  Vitals/Weight were reported by the patient and conducted at home.       Assessment:       Diagnosis Plan   1. S/P AVR     2. Paroxysmal atrial fibrillation (CMS/HCC)     3. Sinus bradycardia     4. Essential hypertension     5. Mixed hyperlipidemia     6. Hypothyroidism, adult            Plan:     1. Aortic Stenosis: Status post aortic valve replacement in December 2016. Repeat echocardiogram 3/2019 showed normal functioning aortic valve.     2. Postoperative Atrial Fibrillation: Previously on warfarin and amiodarone, both discontinued.  Follow-up Holter monitor in March 2017 showed no recurrent atrial fibrillation.     3.  Sinus Bradycardia: This could be occurring due to her underactive thyroid. I have elected to decrease her metoprolol to 25 mg 1 tablet daily. I have recommended that she monitor her pulse at home and call with an update in one month.     4. Coronary Artery Disease: Noted to be mild, nonobstructive per cardiac catheterization 2016.  Continue aspirin and metoprolol. She is not on statin therapy and I do not have a documented reason.     5. Hypertension: Blood pressure normal today.     6. Hyperlipidemia: Her recent lipid panel is not at goal (total 240, triglycerides 168, HDL 88, and ). I think she would benefit from statin therapy, but will defer to Dr. Billy. Statins are not listed in her allergies and her AST/ALT were normal.     7.  Hypothyroidism: Her daughter feels that she is having many symptoms from hypothyroidism and plans to discuss with Dr. Billy.     8. Carotid Artery Disease: Repeat carotid duplex in 2021 and test can be ordered on next office visit.     9. I have recommended follow up with Dr. Bryan in 6 months, unless otherwise needed sooner.     This patient has consented " to a telehealth visit via audio. The visit was scheduled as a audio visit to comply with patient safety concerns in accordance with CDC recommendations.  All vitals recorded within this visit are reported by the patient.  I spent  25 minutes in total including but not limited to the 11 minutes spent in direct conversation with this patient.      As always, it has been a pleasure to participate in your patient's care. Thank you.       Sincerely,         KEVIN Jordan        Dictated utilizing Dragon dictation

## 2023-02-18 NOTE — TELEPHONE ENCOUNTER
Her appt is at 2 pm.    [facility] Triage and after hours / weekends / holidays:  [phone]    Please call the triage or after hours line if you experience a temperature greater than or equal to 100.4, shaking chills, have uncontrolled nausea, vomiting and/or diarrhea, dizziness, shortness of breath, chest pain, bleeding, unexplained bruising, or if you have any other new/concerning symptoms, questions or concerns.      If you are having any concerning symptoms or wish to speak to a provider before your next infusion visit, please call your care coordinator or triage to notify them so we can adequately serve you.     If you need a refill on a narcotic prescription or other medication, please call before your infusion appointment.

## 2025-03-03 NOTE — TELEPHONE ENCOUNTER
RAMU KAYA'S DAUGHTER WAS RETURNING MACARIO'S CALL.   X Size Of Lesion In Cm (Optional): 0 Procedure To Be Performed At Next Visit: Cryotherapy Detail Level: Detailed Introduction Text (Please End With A Colon): The following procedure was deferred: